# Patient Record
Sex: FEMALE | Race: AMERICAN INDIAN OR ALASKA NATIVE | Employment: UNEMPLOYED | URBAN - METROPOLITAN AREA
[De-identification: names, ages, dates, MRNs, and addresses within clinical notes are randomized per-mention and may not be internally consistent; named-entity substitution may affect disease eponyms.]

---

## 2018-03-07 ENCOUNTER — HOSPITAL ENCOUNTER (EMERGENCY)
Age: 33
Discharge: HOME | End: 2018-03-07
Attending: EMERGENCY MEDICINE
Payer: SUBSIDIZED

## 2018-03-07 ENCOUNTER — APPOINTMENT (OUTPATIENT)
Dept: GENERAL RADIOLOGY | Age: 33
End: 2018-03-07
Attending: EMERGENCY MEDICINE
Payer: SUBSIDIZED

## 2018-03-07 VITALS
DIASTOLIC BLOOD PRESSURE: 79 MMHG | RESPIRATION RATE: 22 BRPM | HEART RATE: 110 BPM | HEIGHT: 63 IN | WEIGHT: 130 LBS | SYSTOLIC BLOOD PRESSURE: 126 MMHG | BODY MASS INDEX: 23.04 KG/M2 | OXYGEN SATURATION: 99 % | TEMPERATURE: 98.9 F

## 2018-03-07 DIAGNOSIS — T40.1X1A ACCIDENTAL OVERDOSE OF HEROIN, INITIAL ENCOUNTER (HCC): Primary | ICD-10-CM

## 2018-03-07 PROCEDURE — 94762 N-INVAS EAR/PLS OXIMTRY CONT: CPT | Performed by: EMERGENCY MEDICINE

## 2018-03-07 PROCEDURE — 93005 ELECTROCARDIOGRAM TRACING: CPT | Performed by: EMERGENCY MEDICINE

## 2018-03-07 PROCEDURE — 74011250636 HC RX REV CODE- 250/636: Performed by: EMERGENCY MEDICINE

## 2018-03-07 PROCEDURE — 71045 X-RAY EXAM CHEST 1 VIEW: CPT

## 2018-03-07 PROCEDURE — 99285 EMERGENCY DEPT VISIT HI MDM: CPT | Performed by: EMERGENCY MEDICINE

## 2018-03-07 PROCEDURE — 96374 THER/PROPH/DIAG INJ IV PUSH: CPT | Performed by: EMERGENCY MEDICINE

## 2018-03-07 PROCEDURE — 82962 GLUCOSE BLOOD TEST: CPT | Performed by: EMERGENCY MEDICINE

## 2018-03-07 RX ORDER — NALOXONE HYDROCHLORIDE 0.4 MG/ML
0.4 INJECTION, SOLUTION INTRAMUSCULAR; INTRAVENOUS; SUBCUTANEOUS
Status: COMPLETED | OUTPATIENT
Start: 2018-03-07 | End: 2018-03-07

## 2018-03-07 RX ORDER — NALOXONE HYDROCHLORIDE 0.4 MG/ML
INJECTION, SOLUTION INTRAMUSCULAR; INTRAVENOUS; SUBCUTANEOUS
Status: ACTIVE
Start: 2018-03-07 | End: 2018-03-08

## 2018-03-07 RX ADMIN — NALOXONE HYDROCHLORIDE 0.4 MG: 0.4 INJECTION, SOLUTION INTRAMUSCULAR; INTRAVENOUS; SUBCUTANEOUS at 18:33

## 2018-03-07 NOTE — Clinical Note
Follow-up at [facility] tomorrow morning at 5 AM (no later than 0530)for treatment with methadone or Suboxone.

## 2018-03-08 LAB
ATRIAL RATE: 93 BPM
CALCULATED P AXIS, ECG09: 82 DEGREES
CALCULATED R AXIS, ECG10: 80 DEGREES
CALCULATED T AXIS, ECG11: 76 DEGREES
DIAGNOSIS, 93000: NORMAL
P-R INTERVAL, ECG05: 134 MS
Q-T INTERVAL, ECG07: 336 MS
QRS DURATION, ECG06: 80 MS
QTC CALCULATION (BEZET), ECG08: 417 MS
VENTRICULAR RATE, ECG03: 93 BPM

## 2018-03-08 NOTE — DISCHARGE INSTRUCTIONS
Learning About Naloxone for Opioid Overdose  What is naloxone? Opioids are strong pain medicines. Examples include hydrocodone, oxycodone, fentanyl, and morphine. Heroin is an example of an illegal opioid. Taking too much of an opioid can cause death. An overdose is an emergency. Naloxone is a medicine that reverses the effects of an overdose. If you take it soon enough after an overdose, it can save your life. Naloxone comes in a rescue kit you can carry with you. You may hear it called a Narcan kit for short. The rescue kit may contain:  · The medicine. · Syringes and needles. · A nasal adapter for the syringes. · A separate nasal spray device. Your doctor can give you a prescription for a rescue kit and show you how to use it. In some places you can buy Narcan kits without a prescription. When is naloxone used? Naloxone is used when a person shows signs of an opioid overdose. A person may have overdosed if he or she is:  · Sleepy or hard to wake up. · Confused. · Not breathing normally. Make sure your family and friends know about these signs of an overdose. If someone appears to have overdosed, call 911. A drug overdose is an emergency. How do you use it? If you overdose, you may not be able to give yourself the medicine. So it's very important to teach friends and family how and when to give it to you. Rescue kits come with instructions. There are two ways to give the medicine. Many rescue kits can be used for either method. The methods are:  · Injection with needle and syringe. ¨ Training may be needed in order to use this method correctly. ¨ Some rescue kits come with automatic injectors that don't require special training. ¨ The medicine can be injected through clothing. · Nasal spray. ¨ Many rescue kits come with a nasal adapter. It attaches to the syringe and turns the medicine into a mist.  ¨ The mist is sprayed into the nose of a person who has overdosed.  The person needs to be lying down when the mist is sprayed. Overdose symptoms may return a few minutes after the first dose from the rescue kit. If that happens, a second dose is needed. Rescue kits come with two doses for that reason. Keep your rescue kit with you always. You never know when you might need it. If you think you or someone else may have overdosed but you're not sure, use the kit anyway. Aside from going through withdrawal, which may be uncomfortable, there is no downside to using this medicine. Always go to the emergency room after using naloxone. Doctors will want to make sure the overdose has been reversed. Follow-up care is a key part of your treatment and safety. Be sure to make and go to all appointments, and call your doctor if you are having problems. It's also a good idea to know your test results and keep a list of the medicines you take. Where can you learn more? Go to http://julita-ariana.info/. Enter Z800 in the search box to learn more about \"Learning About Naloxone for Opioid Overdose. \"  Current as of: November 3, 2016  Content Version: 11.4  © 7170-5982 [employer]. Care instructions adapted under license by [employer] (which disclaims liability or warranty for this information). If you have questions about a medical condition or this instruction, always ask your healthcare professional. [address] any warranty or liability for your use of this information.

## 2018-03-08 NOTE — ED PROVIDER NOTES
HPI Comments: Patient dropped off at the front door unconscious and unresponsive after heroin overdose. Patient was not breathing without a pulse and was cyanotic. Patient is a 27 y.o. female presenting with Ingested Medication. The history is provided by a friend. Drug Overdose   This is a new problem. The current episode started less than 1 hour ago. No past medical history on file. No past surgical history on file. No family history on file. Social History     Social History    Marital status: N/A     Spouse name: N/A    Number of children: N/A    Years of education: N/A     Occupational History    Not on file. Social History Main Topics    Smoking status: Not on file    Smokeless tobacco: Not on file    Alcohol use Not on file    Drug use: Not on file    Sexual activity: Not on file     Other Topics Concern    Not on file     Social History Narrative         ALLERGIES: Review of patient's allergies indicates no known allergies. Review of Systems   Unable to perform ROS: Mental status change       Vitals:    03/07/18 1840 03/07/18 1851   BP: 142/81    Pulse: (!) 124    Resp: 15    Temp:  98.9 °F (37.2 °C)   SpO2: 100%    Weight: 59 kg (130 lb)    Height: 5' 3\" (1.6 m)             Physical Exam   Constitutional: She appears distressed. Unresponsive with circumoral cyanosis   Cardiovascular: Normal rate, regular rhythm and normal heart sounds. Pulses:       Carotid pulses are 2+ on the right side, and 2+ on the left side. Radial pulses are 2+ on the right side, and 2+ on the left side. Pulmonary/Chest:   no chest movement   Abdominal: Soft. She exhibits no distension. Musculoskeletal: She exhibits no edema. Neurological: GCS eye subscore is 1. GCS verbal subscore is 1. GCS motor subscore is 1. Skin: Skin is warm. She is diaphoretic. Track marks bilateral forearms and antecubital fossa   Nursing note and vitals reviewed.        MDM  Number of Diagnoses or Management Options  Diagnosis management comments: Patient was brought into room 1 and artificial respirations with an Ambu bag were provided. Initial sats were 12. Patient quickly elaine to a sat of 100%. Patient maintain a pulse throughout. She remained unresponsive. After IV access was obtained 0.4 mg of Narcan was given. Blood sugar was obtained and was unremarkable. After approximately 2 minutes the patient became responsive. 7:36 PM  This is the patient's second overdose in the last week. She went to another hospital and seems open to following up for methadone or Suboxone treatment. She will be referred. Amount and/or Complexity of Data Reviewed  Clinical lab tests: ordered and reviewed  Tests in the radiology section of CPT®: ordered and reviewed (Xr Chest Port    Result Date: 3/7/2018  Portable chest: History: status post opiate overdose. Comparison: None Findings: Portable AP views of the chest were obtained at 1850 hours. The cardiac and mediastinal silhouette are normal in size and configuration. The lungs and pleural spaces are clear. The pulmonary vascularity is within normal limits.      Impression: Unremarkable portable chest radiograph     )          ED Course       Procedures

## 2018-03-08 NOTE — ED NOTES
Pt feels staff did nothing for her. Demanded IV be removed. Stated she was leaving and demanded her clothing. As reported from day shift staff, pt informed clothing removed quickly with trauma scissors on arrival.  Dr. Dwayne Caldwellr aware. Discharge order noted.

## 2018-03-08 NOTE — ED NOTES
Pt remains angry with cursing toward staff. Blaming for situation. I have reviewed discharge instructions with the patient. The patient would not verbalized understanding. Patient left ED via Discharge Method: ambulatory to Home with self. Opportunity for questions and clarification provided. Patient given 0 scripts. To continue your aftercare when you leave the hospital, you may receive an automated call from our care team to check in on how you are doing. This is a free service and part of our promise to provide the best care and service to meet your aftercare needs.  If you have questions, or wish to unsubscribe from this service please call [phone]. Thank you for Choosing our [facility] Emergency Department. Pt found pants lying on floor. Paper shirt provided.

## 2018-03-08 NOTE — ED NOTES
Report to [facility] and Liliya ARMANDO. They are to assume care of this pt at this time.

## 2018-03-20 LAB — GLUCOSE BLD STRIP.AUTO-MCNC: 234 MG/DL (ref 65–100)

## 2018-11-11 ENCOUNTER — APPOINTMENT (OUTPATIENT)
Dept: GENERAL RADIOLOGY | Age: 33
DRG: 580 | End: 2018-11-11
Attending: EMERGENCY MEDICINE
Payer: SUBSIDIZED

## 2018-11-11 ENCOUNTER — HOSPITAL ENCOUNTER (INPATIENT)
Age: 33
LOS: 5 days | Discharge: HOME | DRG: 580 | End: 2018-11-16
Attending: EMERGENCY MEDICINE | Admitting: HOSPITALIST
Payer: SUBSIDIZED

## 2018-11-11 DIAGNOSIS — L03.113 CELLULITIS OF RIGHT UPPER EXTREMITY: Primary | ICD-10-CM

## 2018-11-11 DIAGNOSIS — F19.10 INTRAVENOUS DRUG ABUSE (HCC): ICD-10-CM

## 2018-11-11 LAB
ANION GAP SERPL CALC-SCNC: 7 MMOL/L
BASOPHILS # BLD: 0 K/UL (ref 0–0.2)
BASOPHILS NFR BLD: 0 % (ref 0–2)
BUN SERPL-MCNC: 10 MG/DL (ref 6–23)
CALCIUM SERPL-MCNC: 8.6 MG/DL (ref 8.3–10.4)
CHLORIDE SERPL-SCNC: 101 MMOL/L (ref 98–107)
CO2 SERPL-SCNC: 29 MMOL/L (ref 21–32)
CREAT SERPL-MCNC: 0.62 MG/DL (ref 0.6–1)
DIFFERENTIAL METHOD BLD: ABNORMAL
EOSINOPHIL # BLD: 0.1 K/UL (ref 0–0.8)
EOSINOPHIL NFR BLD: 1 % (ref 0.5–7.8)
ERYTHROCYTE [DISTWIDTH] IN BLOOD BY AUTOMATED COUNT: 13.6 %
GLUCOSE SERPL-MCNC: 102 MG/DL (ref 65–100)
HCT VFR BLD AUTO: 34.2 % (ref 35.8–46.3)
HGB BLD-MCNC: 11.3 G/DL (ref 11.7–15.4)
IMM GRANULOCYTES # BLD: 0 K/UL (ref 0–0.5)
IMM GRANULOCYTES NFR BLD AUTO: 0 % (ref 0–5)
LYMPHOCYTES # BLD: 1.7 K/UL (ref 0.5–4.6)
LYMPHOCYTES NFR BLD: 20 % (ref 13–44)
MCH RBC QN AUTO: 28.3 PG (ref 26.1–32.9)
MCHC RBC AUTO-ENTMCNC: 33 G/DL (ref 31.4–35)
MCV RBC AUTO: 85.7 FL (ref 79.6–97.8)
MONOCYTES # BLD: 0.8 K/UL (ref 0.1–1.3)
MONOCYTES NFR BLD: 10 % (ref 4–12)
NEUTS SEG # BLD: 5.7 K/UL (ref 1.7–8.2)
NEUTS SEG NFR BLD: 68 % (ref 43–78)
NRBC # BLD: 0 K/UL (ref 0–0.2)
PLATELET # BLD AUTO: 280 K/UL (ref 150–450)
PMV BLD AUTO: 9.6 FL (ref 9.4–12.3)
POTASSIUM SERPL-SCNC: 4.1 MMOL/L (ref 3.5–5.1)
RBC # BLD AUTO: 3.99 M/UL (ref 4.05–5.2)
SODIUM SERPL-SCNC: 137 MMOL/L (ref 136–145)
WBC # BLD AUTO: 8.3 K/UL (ref 4.3–11.1)

## 2018-11-11 PROCEDURE — 65270000029 HC RM PRIVATE

## 2018-11-11 PROCEDURE — 0J9G0ZZ DRAINAGE OF RIGHT LOWER ARM SUBCUTANEOUS TISSUE AND FASCIA, OPEN APPROACH: ICD-10-PCS | Performed by: EMERGENCY MEDICINE

## 2018-11-11 PROCEDURE — 73080 X-RAY EXAM OF ELBOW: CPT

## 2018-11-11 PROCEDURE — 36415 COLL VENOUS BLD VENIPUNCTURE: CPT

## 2018-11-11 PROCEDURE — 87040 BLOOD CULTURE FOR BACTERIA: CPT

## 2018-11-11 PROCEDURE — 85025 COMPLETE CBC W/AUTO DIFF WBC: CPT

## 2018-11-11 PROCEDURE — 80048 BASIC METABOLIC PNL TOTAL CA: CPT

## 2018-11-11 PROCEDURE — 77030019895 HC PCKNG STRP IODO -A

## 2018-11-11 PROCEDURE — 96374 THER/PROPH/DIAG INJ IV PUSH: CPT | Performed by: EMERGENCY MEDICINE

## 2018-11-11 PROCEDURE — 99283 EMERGENCY DEPT VISIT LOW MDM: CPT | Performed by: EMERGENCY MEDICINE

## 2018-11-11 PROCEDURE — 75810000289 HC I&D ABSCESS SIMP/COMP/MULT: Performed by: EMERGENCY MEDICINE

## 2018-11-11 PROCEDURE — 87205 SMEAR GRAM STAIN: CPT

## 2018-11-11 PROCEDURE — 74011250636 HC RX REV CODE- 250/636: Performed by: EMERGENCY MEDICINE

## 2018-11-11 RX ORDER — TRAMADOL HYDROCHLORIDE 50 MG/1
50 TABLET ORAL
Status: DISCONTINUED | OUTPATIENT
Start: 2018-11-11 | End: 2018-11-16 | Stop reason: HOSPADM

## 2018-11-11 RX ORDER — CLINDAMYCIN PHOSPHATE 900 MG/50ML
900 INJECTION INTRAVENOUS ONCE
Status: COMPLETED | OUTPATIENT
Start: 2018-11-11 | End: 2018-11-11

## 2018-11-11 RX ORDER — ACETAMINOPHEN 325 MG/1
650 TABLET ORAL
Status: DISCONTINUED | OUTPATIENT
Start: 2018-11-11 | End: 2018-11-16 | Stop reason: HOSPADM

## 2018-11-11 RX ORDER — NALOXONE HYDROCHLORIDE 0.4 MG/ML
0.4 INJECTION, SOLUTION INTRAMUSCULAR; INTRAVENOUS; SUBCUTANEOUS AS NEEDED
Status: DISCONTINUED | OUTPATIENT
Start: 2018-11-11 | End: 2018-11-16 | Stop reason: HOSPADM

## 2018-11-11 RX ORDER — MORPHINE SULFATE 8 MG/ML
4 INJECTION, SOLUTION INTRAMUSCULAR; INTRAVENOUS ONCE
Status: COMPLETED | OUTPATIENT
Start: 2018-11-11 | End: 2018-11-11

## 2018-11-11 RX ORDER — SODIUM CHLORIDE 0.9 % (FLUSH) 0.9 %
5-10 SYRINGE (ML) INJECTION AS NEEDED
Status: DISCONTINUED | OUTPATIENT
Start: 2018-11-11 | End: 2018-11-16 | Stop reason: HOSPADM

## 2018-11-11 RX ORDER — CLINDAMYCIN PHOSPHATE 600 MG/50ML
600 INJECTION INTRAVENOUS EVERY 8 HOURS
Status: DISCONTINUED | OUTPATIENT
Start: 2018-11-12 | End: 2018-11-12

## 2018-11-11 RX ORDER — SODIUM CHLORIDE 0.9 % (FLUSH) 0.9 %
5-10 SYRINGE (ML) INJECTION EVERY 8 HOURS
Status: DISCONTINUED | OUTPATIENT
Start: 2018-11-12 | End: 2018-11-16 | Stop reason: HOSPADM

## 2018-11-11 RX ORDER — LIDOCAINE HYDROCHLORIDE 10 MG/ML
10 INJECTION, SOLUTION EPIDURAL; INFILTRATION; INTRACAUDAL; PERINEURAL
Status: ACTIVE | OUTPATIENT
Start: 2018-11-11 | End: 2018-11-12

## 2018-11-11 RX ADMIN — Medication 4 MG: at 21:53

## 2018-11-11 RX ADMIN — CLINDAMYCIN PHOSPHATE 900 MG: 900 INJECTION, SOLUTION INTRAVENOUS at 23:26

## 2018-11-12 PROBLEM — F19.10 DRUG ABUSE (HCC): Status: ACTIVE | Noted: 2018-11-12

## 2018-11-12 LAB
ALBUMIN SERPL-MCNC: 2.8 G/DL (ref 3.5–5)
ALBUMIN/GLOB SERPL: 0.7 {RATIO}
ALP SERPL-CCNC: 79 U/L (ref 50–136)
ALT SERPL-CCNC: 23 U/L (ref 12–65)
AMPHET UR QL SCN: POSITIVE
ANION GAP SERPL CALC-SCNC: 12 MMOL/L
AST SERPL-CCNC: 16 U/L (ref 15–37)
BACTERIA SPEC CULT: ABNORMAL
BACTERIA SPEC CULT: ABNORMAL
BARBITURATES UR QL SCN: NEGATIVE
BASOPHILS # BLD: 0 K/UL (ref 0–0.2)
BASOPHILS NFR BLD: 0 % (ref 0–2)
BENZODIAZ UR QL: POSITIVE
BILIRUB SERPL-MCNC: 0.3 MG/DL (ref 0.2–1.1)
BUN SERPL-MCNC: 6 MG/DL (ref 6–23)
CALCIUM SERPL-MCNC: 8.7 MG/DL (ref 8.3–10.4)
CANNABINOIDS UR QL SCN: POSITIVE
CHLORIDE SERPL-SCNC: 101 MMOL/L (ref 98–107)
CO2 SERPL-SCNC: 23 MMOL/L (ref 21–32)
COCAINE UR QL SCN: NEGATIVE
CREAT SERPL-MCNC: 0.57 MG/DL (ref 0.6–1)
CRP SERPL-MCNC: 7.1 MG/DL (ref 0–0.9)
DIFFERENTIAL METHOD BLD: ABNORMAL
EOSINOPHIL # BLD: 0.1 K/UL (ref 0–0.8)
EOSINOPHIL NFR BLD: 1 % (ref 0.5–7.8)
ERYTHROCYTE [DISTWIDTH] IN BLOOD BY AUTOMATED COUNT: 13.3 %
GLOBULIN SER CALC-MCNC: 4.3 G/DL (ref 2.3–3.5)
GLUCOSE SERPL-MCNC: 119 MG/DL (ref 65–100)
HCG UR QL: NEGATIVE
HCT VFR BLD AUTO: 34.7 % (ref 35.8–46.3)
HGB BLD-MCNC: 11.4 G/DL (ref 11.7–15.4)
HIV1 P24 AG SERPL QL IA: NONREACTIVE
HIV1+2 AB SERPL QL IA: NONREACTIVE
IMM GRANULOCYTES # BLD: 0.1 K/UL (ref 0–0.5)
IMM GRANULOCYTES NFR BLD AUTO: 1 % (ref 0–5)
LYMPHOCYTES # BLD: 1.8 K/UL (ref 0.5–4.6)
LYMPHOCYTES NFR BLD: 19 % (ref 13–44)
MCH RBC QN AUTO: 28 PG (ref 26.1–32.9)
MCHC RBC AUTO-ENTMCNC: 32.9 G/DL (ref 31.4–35)
MCV RBC AUTO: 85.3 FL (ref 79.6–97.8)
METHADONE UR QL: NEGATIVE
MONOCYTES # BLD: 0.9 K/UL (ref 0.1–1.3)
MONOCYTES NFR BLD: 10 % (ref 4–12)
NEUTS SEG # BLD: 6.5 K/UL (ref 1.7–8.2)
NEUTS SEG NFR BLD: 69 % (ref 43–78)
NRBC # BLD: 0 K/UL (ref 0–0.2)
OPIATES UR QL: POSITIVE
PCP UR QL: NEGATIVE
PLATELET # BLD AUTO: 205 K/UL (ref 150–450)
PLATELET COMMENTS,PCOM: ADEQUATE
PMV BLD AUTO: 10.9 FL (ref 9.4–12.3)
POTASSIUM SERPL-SCNC: 4.3 MMOL/L (ref 3.5–5.1)
PROT SERPL-MCNC: 7.1 G/DL
RBC # BLD AUTO: 4.07 M/UL (ref 4.05–5.2)
RBC MORPH BLD: ABNORMAL
SERVICE CMNT-IMP: ABNORMAL
SODIUM SERPL-SCNC: 136 MMOL/L (ref 136–145)
WBC # BLD AUTO: 9.4 K/UL (ref 4.3–11.1)
WBC MORPH BLD: ABNORMAL

## 2018-11-12 PROCEDURE — 77030019895 HC PCKNG STRP IODO -A

## 2018-11-12 PROCEDURE — 74011250636 HC RX REV CODE- 250/636: Performed by: HOSPITALIST

## 2018-11-12 PROCEDURE — 87389 HIV-1 AG W/HIV-1&-2 AB AG IA: CPT

## 2018-11-12 PROCEDURE — 86140 C-REACTIVE PROTEIN: CPT

## 2018-11-12 PROCEDURE — 80053 COMPREHEN METABOLIC PANEL: CPT

## 2018-11-12 PROCEDURE — 77030032490 HC SLV COMPR SCD KNE COVD -B

## 2018-11-12 PROCEDURE — 85025 COMPLETE CBC W/AUTO DIFF WBC: CPT

## 2018-11-12 PROCEDURE — 81025 URINE PREGNANCY TEST: CPT

## 2018-11-12 PROCEDURE — 74011250637 HC RX REV CODE- 250/637: Performed by: HOSPITALIST

## 2018-11-12 PROCEDURE — 74011250636 HC RX REV CODE- 250/636: Performed by: INTERNAL MEDICINE

## 2018-11-12 PROCEDURE — 36415 COLL VENOUS BLD VENIPUNCTURE: CPT

## 2018-11-12 PROCEDURE — 74011250637 HC RX REV CODE- 250/637: Performed by: INTERNAL MEDICINE

## 2018-11-12 PROCEDURE — 80307 DRUG TEST PRSMV CHEM ANLYZR: CPT

## 2018-11-12 PROCEDURE — 65270000029 HC RM PRIVATE

## 2018-11-12 PROCEDURE — 87641 MR-STAPH DNA AMP PROBE: CPT

## 2018-11-12 RX ORDER — METHADONE HYDROCHLORIDE 10 MG/1
10 TABLET ORAL EVERY 8 HOURS
Status: DISCONTINUED | OUTPATIENT
Start: 2018-11-12 | End: 2018-11-16 | Stop reason: HOSPADM

## 2018-11-12 RX ORDER — ONDANSETRON 2 MG/ML
4 INJECTION INTRAMUSCULAR; INTRAVENOUS
Status: DISCONTINUED | OUTPATIENT
Start: 2018-11-12 | End: 2018-11-13

## 2018-11-12 RX ORDER — OXYCODONE AND ACETAMINOPHEN 7.5; 325 MG/1; MG/1
1 TABLET ORAL ONCE
Status: COMPLETED | OUTPATIENT
Start: 2018-11-12 | End: 2018-11-12

## 2018-11-12 RX ORDER — KETOROLAC TROMETHAMINE 15 MG/ML
30 INJECTION, SOLUTION INTRAMUSCULAR; INTRAVENOUS ONCE
Status: COMPLETED | OUTPATIENT
Start: 2018-11-12 | End: 2018-11-12

## 2018-11-12 RX ORDER — ZOLPIDEM TARTRATE 5 MG/1
5 TABLET ORAL
Status: DISCONTINUED | OUTPATIENT
Start: 2018-11-12 | End: 2018-11-16 | Stop reason: HOSPADM

## 2018-11-12 RX ORDER — CLINDAMYCIN PHOSPHATE 600 MG/50ML
600 INJECTION INTRAVENOUS EVERY 6 HOURS
Status: DISCONTINUED | OUTPATIENT
Start: 2018-11-12 | End: 2018-11-15 | Stop reason: DRUGHIGH

## 2018-11-12 RX ORDER — KETOROLAC TROMETHAMINE 15 MG/ML
15 INJECTION, SOLUTION INTRAMUSCULAR; INTRAVENOUS
Status: DISCONTINUED | OUTPATIENT
Start: 2018-11-12 | End: 2018-11-16 | Stop reason: HOSPADM

## 2018-11-12 RX ADMIN — OXYCODONE HYDROCHLORIDE AND ACETAMINOPHEN 1 TABLET: 7.5; 325 TABLET ORAL at 03:19

## 2018-11-12 RX ADMIN — METHADONE HYDROCHLORIDE 10 MG: 10 TABLET ORAL at 22:14

## 2018-11-12 RX ADMIN — KETOROLAC TROMETHAMINE 15 MG: 15 INJECTION, SOLUTION INTRAMUSCULAR; INTRAVENOUS at 12:34

## 2018-11-12 RX ADMIN — Medication 10 ML: at 16:33

## 2018-11-12 RX ADMIN — Medication 10 ML: at 00:15

## 2018-11-12 RX ADMIN — TRAMADOL HYDROCHLORIDE 50 MG: 50 TABLET, FILM COATED ORAL at 00:11

## 2018-11-12 RX ADMIN — TRAMADOL HYDROCHLORIDE 50 MG: 50 TABLET, FILM COATED ORAL at 22:14

## 2018-11-12 RX ADMIN — TRAMADOL HYDROCHLORIDE 50 MG: 50 TABLET, FILM COATED ORAL at 09:01

## 2018-11-12 RX ADMIN — ZOLPIDEM TARTRATE 5 MG: 5 TABLET ORAL at 00:24

## 2018-11-12 RX ADMIN — CLINDAMYCIN PHOSPHATE 600 MG: 600 INJECTION, SOLUTION INTRAVENOUS at 18:30

## 2018-11-12 RX ADMIN — TRAMADOL HYDROCHLORIDE 50 MG: 50 TABLET, FILM COATED ORAL at 16:29

## 2018-11-12 RX ADMIN — ACETAMINOPHEN 650 MG: 325 TABLET, FILM COATED ORAL at 09:01

## 2018-11-12 RX ADMIN — ACETAMINOPHEN 650 MG: 325 TABLET, FILM COATED ORAL at 00:09

## 2018-11-12 RX ADMIN — KETOROLAC TROMETHAMINE 30 MG: 15 INJECTION, SOLUTION INTRAMUSCULAR; INTRAVENOUS at 05:23

## 2018-11-12 RX ADMIN — ACETAMINOPHEN 650 MG: 325 TABLET, FILM COATED ORAL at 16:29

## 2018-11-12 RX ADMIN — METHADONE HYDROCHLORIDE 10 MG: 10 TABLET ORAL at 12:33

## 2018-11-12 RX ADMIN — CLINDAMYCIN PHOSPHATE 600 MG: 600 INJECTION, SOLUTION INTRAVENOUS at 12:34

## 2018-11-12 RX ADMIN — Medication 10 ML: at 14:00

## 2018-11-12 RX ADMIN — ONDANSETRON 4 MG: 2 INJECTION, SOLUTION INTRAMUSCULAR; INTRAVENOUS at 16:29

## 2018-11-12 RX ADMIN — ZOLPIDEM TARTRATE 5 MG: 5 TABLET ORAL at 22:14

## 2018-11-12 NOTE — ED NOTES
TRANSFER - OUT REPORT: 
 
Verbal report [facility] to [address] [place] [address] (unit) for routine progression of care Report consisted of patients Situation, Background, Assessment and  
Recommendations(SBAR). Information from the following report(s) ED Summary was reviewed with the receiving nurse. Lines:  
Peripheral IV 11/11/18 Left Antecubital (Active) Site Assessment Clean, dry, & intact 11/11/2018  8:33 PM  
Phlebitis Assessment 0 11/11/2018  8:33 PM  
Infiltration Assessment 0 11/11/2018  8:33 PM  
  
 
Opportunity for questions and clarification was provided. Patient transported with: 
 [employer]

## 2018-11-12 NOTE — PROGRESS NOTES
Dressing changed to right arm, removed packing and repacked IDOFORM tape, opening 1 CM and draining serosanguinous drainage. Covered then with gauze and tegaderm and wrapped with ace wrap, red area around wound was marked with skin marker.

## 2018-11-12 NOTE — PROGRESS NOTES
Critical nasal swab + = MRSA and reported to MD @ 1414 and d/c'd clindo and ordered cx for Vancomycin. Pt placed on contact precautions.   Katie Griffiths RN

## 2018-11-12 NOTE — ED NOTES
Case discussed with hospitalist due to patient's history of drug abuse and the symptomatology and size of the infection was felt the admission for observation and repeat IV antibiotics be the best course of action to avoid losing to follow-up.

## 2018-11-12 NOTE — PROGRESS NOTES
Rec'd report from jam ARMANDO @0460 SBAR and in room @ [phone]. Oriented to room; call bell, bed controls, TV control,  and IV/ABT complete and hep-welled. Dr Gardenia Muller at bedside and he reviewed and reiterated there would be no IV pain medication. Only VS to obtain would be O2 sat/temp secondary to I&D on right arm and PIV in left ac. .  May resume complete VS after next IV/ABT.  0100: Called MD for clarification on a hot cloth for I&D site vs cold and requested niether. He did give a 1 X order fopr percocet7.5 po. Pt rec'd tramadol and tylenol 0011. After assessment and pain med givenpt is calming down and stopped swearing at me with c/o of \"all of us not addressing her pain and having to go to the streets. \" Skin assessment complete with Cata Ambrosio. Several tattoos on hands and arms and a  horizontal 
On lower abdomen. 0130: pt resting quietly and calmed down. Has juice at bedside and water. Also advised her the urine cup was in the bathroom for her UA test.  Until that is received, MD cannot order the toradol. Pt aware. Heat turned down for comfort abd 4 blankets removed.   Alisa Hightower RN

## 2018-11-12 NOTE — PROGRESS NOTES
Problem: Falls - Risk of 
Goal: *Absence of Falls Document Sofia Covington Fall Risk and appropriate interventions in the flowsheet. Outcome: Progressing Towards Goal 
Fall Risk Interventions: 
Mobility Interventions: Patient to call before getting OOB Mentation Interventions: Adequate sleep, hydration, pain control, Evaluate medications/consider consulting pharmacy Medication Interventions: Evaluate medications/consider consulting pharmacy, Patient to call before getting OOB Elimination Interventions: Patient to call for help with toileting needs

## 2018-11-12 NOTE — PROGRESS NOTES
Cm visited patient due to self-pay status. Pt states that she get funds from the [employer] and they pay for her medical bills. Pt do not have a PCP, but is wanting to get set up with a PCP. CM provided patient with [facility] and the [employer] information. Pt is currently a Heroin addict and want to get assistance from a Methadone Clinic. Cm provided pt with [facility]. Patient states no PCP and no insurance. Explained the [address] program in detail and provided patient with resources. Contact information for [facility] with [address] given to patient and patient encouraged to follow up with her as soon as possible. Patient also given contact information for [facility] with [place] and encouraged to call to get screened for Medicaid/Insurance eligibility and/or financial assistance.

## 2018-11-12 NOTE — PROGRESS NOTES
Initial visit was made, emotional support and a spiritual presence were provided.  card was left with the patient. This patient was asleep. Marry Kraft

## 2018-11-12 NOTE — ED PROVIDER NOTES
Patient is return for evaluation due to pain swelling redness and  Warmth to the antecubital fossa of the right arm. She states this is been developing over the past several days. Patient does admit to IV drug use as well as polysubstance abuse. She reports a similar occurrence in the opposite arm a few years ago. She denies any fever or chills chest pain or shortness of breath she denies any other lesions or rashes. The patient has been injecting in the right forearm distal to the area of presumed infection. The history is provided by the patient. Skin Problem This is a new problem. The current episode started 2 days ago. The problem has been rapidly worsening. Associated with: IV or subcutaneous drug use. There has been no fever. The rash is present on the right arm. The pain is at a severity of 10/10. The pain is severe. The pain has been constant since onset. Associated symptoms include pain. Pertinent negatives include no blisters, no itching, no weeping and no hives. Risk factors: IV drug use. She has tried nothing for the symptoms. The treatment provided no relief. History reviewed. No pertinent past medical history. Past Surgical History:  
Procedure Laterality Date  HX  SECTION    
 HX GYN    
 HX ORTHOPAEDIC  2018  
 left arm  HX TUBAL LIGATION Bilateral   
 
   
History reviewed. No pertinent family history. Social History Socioeconomic History  Marital status: SINGLE Spouse name: Not on file  Number of children: Not on file  Years of education: Not on file  Highest education level: Not on file Social Needs  Financial resource strain: Not on file  Food insecurity - worry: Not on file  Food insecurity - inability: Not on file  Transportation needs - medical: Not on file  Transportation needs - non-medical: Not on file Occupational History  Not on file Tobacco Use  Smoking status: Never Smoker  Smokeless tobacco: Never Used Substance and Sexual Activity  Alcohol use: No  
  Frequency: Never  Drug use: Yes Types: Marijuana, Methamphetamines, Heroin, Opiates Comment: daily use  Sexual activity: Not on file Other Topics Concern  Not on file Social History Narrative  Not on file ALLERGIES: Patient has no known allergies. Review of Systems Constitutional: Negative for chills and fever. Skin: Negative for itching. All other systems reviewed and are negative. Vitals:  
 11/11/18 1946 BP: 119/72 Pulse: (!) 118 Resp: 20 Temp: 98.7 °F (37.1 °C) SpO2: 97% Weight: 63.5 kg (140 lb) Height: 5' 3\" (1.6 m) Physical Exam  
Constitutional: She appears well-developed and well-nourished. No distress. HENT:  
Head: Normocephalic and atraumatic. Very poor dentition Eyes: Conjunctivae and EOM are normal. Pupils are equal, round, and reactive to light. Neck: Normal range of motion. Neck supple. Cardiovascular: Normal rate and regular rhythm. Exam reveals no gallop and no friction rub. No murmur heard. Pulmonary/Chest: Effort normal.  
Skin: Skin is warm and dry. A large indurated and erythematous and tender area is noted to the proximal forearm at the level of the antecubital fossa on the right. Bedside ultrasound shows abscess cavity in the subcutaneous tissues. The extremities otherwise neurovascularly intact there is no tenderness to the medial lateral epicondyles or the olecranon process there is limited range of motion in extension. Psychiatric: She has a normal mood and affect. Her behavior is normal.  
Nursing note and vitals reviewed. MDM Number of Diagnoses or Management Options Amount and/or Complexity of Data Reviewed Clinical lab tests: ordered and reviewed Tests in the radiology section of CPT®: ordered and reviewed Independent visualization of images, tracings, or specimens: yes Risk of Complications, Morbidity, and/or Mortality Presenting problems: moderate Diagnostic procedures: moderate Management options: moderate Patient Progress Patient progress: stable I&D Abcess Simple Date/Time: 11/11/2018 10:54 PM 
Performed by: Emir Rubio DO Authorized by: Emir Rubio DO Consent:  
  Consent obtained:  Verbal 
  Consent given by:  Patient Risks discussed:  Bleeding Alternatives discussed:  No treatment Location:  
  Type:  Abscess Location:  Upper extremity Upper extremity location:  Arm Arm location:  R lower arm Pre-procedure details:  
  Skin preparation:  Betadine Anesthesia (see MAR for exact dosages): Anesthesia method:  Local infiltration Local anesthetic:  Lidocaine 1% w/o epi Procedure type:  
  Complexity:  Complex Procedure details:  
  Needle aspiration: no Incision types:  Single straight Incision depth:  Subcutaneous Scalpel blade:  11 Techniques: probed with blunt dissection with hemostat. Drainage:  Bloody Drainage amount: Moderate Wound treatment:  Wound left open Packing materials:  1/2 in gauze Post-procedure details:  
  Patient tolerance of procedure: Tolerated well, no immediate complications

## 2018-11-12 NOTE — H&P
Hospitalist H&P/Consult Note Admit Date:  2018  7:55 PM  
Name:  Vik Arellano Age:  26 y.o. 
:  1985 MRN:  [id number] PCP:  None Treatment Team: Attending Provider: Felice Birmingham MD 
 
HPI:  
27 y/o female with hx IVDA, polysubstance drug abuse (heroin, meth, THC, opiates) presents to ED with pain, erythema swelling right forearm. She has cellulitis and abscess. She is right-handed and usually shoots up in left arm. ED MD performed I & D and got out mainly blood. Placed on IV clindamycin. Due to extent of infection recommended admission. Blood cultures obtained. She is tachycardic but afebrile. 10 systems reviewed and negative except as noted in HPI. History reviewed. No pertinent past medical history. prior abscess left arm Past Surgical History:  
Procedure Laterality Date  HX  SECTION    
 HX GYN    
 HX ORTHOPAEDIC  2018  
 left arm  HX TUBAL LIGATION Bilateral   
  
None No Known Allergies Social History Tobacco Use  Smoking status: Never Smoker  Smokeless tobacco: Never Used Substance Use Topics  Alcohol use: No  
  Frequency: Never  
 polysubstance drug abuse, injection History reviewed. No pertinent family history. There is no immunization history on file for this patient. Objective:  
 
Patient Vitals for the past 24 hrs: 
 Temp Pulse Resp BP SpO2  
18    111/67 99 % 185    131/69 100 % 18 98.7 °F (37.1 °C) (!) 118 20 119/72 97 % Oxygen Therapy O2 Sat (%): 99 % (18) Pulse via Oximetry: 101 beats per minute (18) O2 Device: Room air (18) No intake or output data in the 24 hours ending 18 0013 Physical Exam: 
General:    Well nourished. Alert. Moaning and crying Eyes:   Normal sclera. Extraocular movements intact. ENT:  Normocephalic, atraumatic. Poor dentitiion CV:   tachycardic. No murmur, rub, or gallop. Lungs:  CTAB. No wheezing, rhonchi, or rales. Abdomen: Soft, nontender, nondistended. Bowel sounds normal.  
Extremities: Warm and dry. No cyanosis or edema. Neurologic: CN II-XII grossly intact. Sensation intact. Skin:     No rashes or jaundice. Right forearm abscess/cellulitis Multiple tattoos Psych:  Normal mood and tearful, emotionally labile affect. I reviewed the labs, imaging, EKGs, telemetry, and other studies done this admission. Data Review:  
Recent Results (from the past 24 hour(s)) CBC WITH AUTOMATED DIFF Collection Time: 11/11/18  8:24 PM  
Result Value Ref Range WBC 8.3 4.3 - 11.1 K/uL  
 RBC 3.99 (L) 4.05 - 5.2 M/uL  
 HGB 11.3 (L) 11.7 - 15.4 g/dL HCT 34.2 (L) 35.8 - 46.3 % MCV 85.7 79.6 - 97.8 FL  
 MCH 28.3 26.1 - 32.9 PG  
 MCHC 33.0 31.4 - 35.0 g/dL  
 RDW 13.6 % PLATELET 101 689 - 492 K/uL MPV 9.6 9.4 - 12.3 FL ABSOLUTE NRBC 0.00 0.0 - 0.2 K/uL  
 DF AUTOMATED NEUTROPHILS 68 43 - 78 % LYMPHOCYTES 20 13 - 44 % MONOCYTES 10 4.0 - 12.0 % EOSINOPHILS 1 0.5 - 7.8 % BASOPHILS 0 0.0 - 2.0 % IMMATURE GRANULOCYTES 0 0.0 - 5.0 %  
 ABS. NEUTROPHILS 5.7 1.7 - 8.2 K/UL  
 ABS. LYMPHOCYTES 1.7 0.5 - 4.6 K/UL  
 ABS. MONOCYTES 0.8 0.1 - 1.3 K/UL  
 ABS. EOSINOPHILS 0.1 0.0 - 0.8 K/UL  
 ABS. BASOPHILS 0.0 0.0 - 0.2 K/UL  
 ABS. IMM. GRANS. 0.0 0.0 - 0.5 K/UL METABOLIC PANEL, BASIC Collection Time: 11/11/18  8:24 PM  
Result Value Ref Range Sodium 137 136 - 145 mmol/L Potassium 4.1 3.5 - 5.1 mmol/L Chloride 101 98 - 107 mmol/L  
 CO2 29 21 - 32 mmol/L Anion gap 7 mmol/L Glucose 102 (H) 65 - 100 mg/dL BUN 10 6 - 23 MG/DL Creatinine 0.62 0.6 - 1.0 MG/DL  
 GFR est AA >60 >60 ml/min/1.73m2 GFR est non-AA >60 ml/min/1.73m2 Calcium 8.6 8.3 - 10.4 MG/DL Imaging Studies: CXR Results  (Last 48 hours) None CT Results  (Last 48 hours) None Assessment and Plan: Hospital Problems as of 11/12/2018 Never Reviewed Codes Class Noted - Resolved POA * (Principal) Cellulitis of right arm ICD-10-CM: L03.113 ICD-9-CM: 682.3  11/11/2018 - Present Yes Drug abuse ([facility] [address] ICD-10-CM: F19.10 ICD-9-CM: 305.90  11/12/2018 - Present Yes PLAN: 
· Admit as inpatient, medical bed · Continue IV clindamycin · F/u blood cultures. Check nasal MRSA · If abscess increases then consult surgery · Await UDS. Potential to withdraw · Tylenol and ultram for pain. toradol IV if UCG negative · Try to avoid excessive controlled substances · With hx drug abuse check LFTs and HIV status · May have ambien for sleep · SCDs for DVT prophylaxis Estimated LOS:  Pending clinical course Signed: 
Nicholas Maxwell MD

## 2018-11-12 NOTE — PROGRESS NOTES
Hospitalist Progress Note 2018 Admit Date: 2018  7:55 PM  
NAME: Radha Izquierdo :  1985 MRN:  [id number] Attending: Jacinda Montes MD 
PCP:  None SUBJECTIVE:  
Patient is a 27 yo with IV polysubstance abuse admitted for R arm cellulitis. Had apparent small abscess that I&D was attempted in ED. Patient reports R arm still painful but better than last night. She feels she is going through heroin withdrawals and is getting nauseous and hot/cold. Nasal swab negative for MRSA; only SA DNA detected. Review of Systems negative with exception of pertinent positives noted above PHYSICAL EXAM  
 
Visit Vitals /69 (BP 1 Location: Left arm, BP Patient Position: At rest) Pulse 97 Temp 97.4 °F (36.3 °C) Resp 18 Ht 5' 3\" (1.6 m) Wt 63.5 kg (140 lb) SpO2 98% Breastfeeding? No  
BMI 24.80 kg/m² Temp (24hrs), Av.2 °F (36.8 °C), Min:97.4 °F (36.3 °C), Max:98.8 °F (37.1 °C) Patient Vitals for the past 24 hrs: 
 Temp Pulse Resp BP SpO2  
18 1521 98.6 °F (37 °C) 100 16 99/63 99 % 18 1110 97.4 °F (36.3 °C) 97 18 101/69 98 % 18 0708 97.7 °F (36.5 °C) 87 18 99/69 98 % 18 0330 98.4 °F (36.9 °C) 89 16  98 % 18 0016 98.8 °F (37.1 °C) (!) 106 18  100 % 18 2200    111/67 99 % 18 2155    131/69 100 % 18 1946 98.7 °F (37.1 °C) (!) 118 20 119/72 97 % Oxygen Therapy O2 Sat (%): 98 % (18 1110) Pulse via Oximetry: 101 beats per minute (18) O2 Device: Room air (18) Intake/Output Summary (Last 24 hours) at 2018 1149 Last data filed at 2018 1343 Gross per 24 hour Intake  Output 850 ml Net -850 ml General: No acute distress   
Lungs:  CTA Bilaterally. Heart:  Regular rate and rhythm,  No murmur, rub, or gallop Abdomen: Soft, Non distended, Non tender, Positive bowel sounds Extremities: No cyanosis, clubbing or edema, R arm with packing to I&D site, no fluctuence palpated, surrounding erythema of ~4x3 cm. Neurologic:  No focal deficits Recent Results (from the past 24 hour(s)) CBC WITH AUTOMATED DIFF Collection Time: 11/11/18  8:24 PM  
Result Value Ref Range WBC 8.3 4.3 - 11.1 K/uL  
 RBC 3.99 (L) 4.05 - 5.2 M/uL  
 HGB 11.3 (L) 11.7 - 15.4 g/dL HCT 34.2 (L) 35.8 - 46.3 % MCV 85.7 79.6 - 97.8 FL  
 MCH 28.3 26.1 - 32.9 PG  
 MCHC 33.0 31.4 - 35.0 g/dL  
 RDW 13.6 % PLATELET 708 728 - 175 K/uL MPV 9.6 9.4 - 12.3 FL ABSOLUTE NRBC 0.00 0.0 - 0.2 K/uL  
 DF AUTOMATED NEUTROPHILS 68 43 - 78 % LYMPHOCYTES 20 13 - 44 % MONOCYTES 10 4.0 - 12.0 % EOSINOPHILS 1 0.5 - 7.8 % BASOPHILS 0 0.0 - 2.0 % IMMATURE GRANULOCYTES 0 0.0 - 5.0 %  
 ABS. NEUTROPHILS 5.7 1.7 - 8.2 K/UL  
 ABS. LYMPHOCYTES 1.7 0.5 - 4.6 K/UL  
 ABS. MONOCYTES 0.8 0.1 - 1.3 K/UL  
 ABS. EOSINOPHILS 0.1 0.0 - 0.8 K/UL  
 ABS. BASOPHILS 0.0 0.0 - 0.2 K/UL  
 ABS. IMM. GRANS. 0.0 0.0 - 0.5 K/UL METABOLIC PANEL, BASIC Collection Time: 11/11/18  8:24 PM  
Result Value Ref Range Sodium 137 136 - 145 mmol/L Potassium 4.1 3.5 - 5.1 mmol/L Chloride 101 98 - 107 mmol/L  
 CO2 29 21 - 32 mmol/L Anion gap 7 mmol/L Glucose 102 (H) 65 - 100 mg/dL BUN 10 6 - 23 MG/DL Creatinine 0.62 0.6 - 1.0 MG/DL  
 GFR est AA >60 >60 ml/min/1.73m2 GFR est non-AA >60 ml/min/1.73m2 Calcium 8.6 8.3 - 10.4 MG/DL  
CULTURE, BLOOD Collection Time: 11/11/18 10:56 PM  
Result Value Ref Range Special Requests: NO SPECIAL REQUESTS 
LEFT 
FOREARM Culture result: NO GROWTH AFTER 8 HOURS    
CULTURE, BLOOD Collection Time: 11/11/18 10:56 PM  
Result Value Ref Range Special Requests: NO SPECIAL REQUESTS 
LEFT 
FOREARM Culture result: NO GROWTH AFTER 8 HOURS    
DRUG SCREEN, URINE Collection Time: 11/12/18  3:39 AM  
Result Value Ref Range PCP(PHENCYCLIDINE) NEGATIVE BENZODIAZEPINES POSITIVE    
 COCAINE NEGATIVE AMPHETAMINES POSITIVE METHADONE NEGATIVE     
 THC (TH-CANNABINOL) POSITIVE    
 OPIATES POSITIVE    
 BARBITURATES NEGATIVE     
HCG URINE, QL Collection Time: 11/12/18  3:39 AM  
Result Value Ref Range HCG urine, QL NEGATIVE  NEG    
MSSA/MRSA SC BY PCR, NASAL SWAB Collection Time: 11/12/18  4:19 AM  
Result Value Ref Range Special Requests: NO SPECIAL REQUESTS Culture result: (A) MRSA target DNA not detected, SA target DNA detected. A MRSA negative, SA positive test result does not preclude MRSA nasal colonization. Culture result: RESULTS VERIFIED, PHONED TO AND READ BACK BY 
PRABHA LONGO TO Nadiraheather Ron [address]oueiredo [address] ON [email or web address] METABOLIC PANEL, COMPREHENSIVE Collection Time: 11/12/18  4:51 AM  
Result Value Ref Range Sodium 136 136 - 145 mmol/L Potassium 4.3 3.5 - 5.1 mmol/L Chloride 101 98 - 107 mmol/L  
 CO2 23 21 - 32 mmol/L Anion gap 12 mmol/L Glucose 119 (H) 65 - 100 mg/dL BUN 6 6 - 23 MG/DL Creatinine 0.57 (L) 0.6 - 1.0 MG/DL  
 GFR est AA >60 >60 ml/min/1.73m2 GFR est non-AA >60 ml/min/1.73m2 Calcium 8.7 8.3 - 10.4 MG/DL Bilirubin, total 0.3 0.2 - 1.1 MG/DL  
 ALT (SGPT) 23 12 - 65 U/L  
 AST (SGOT) 16 15 - 37 U/L Alk. phosphatase 79 50 - 136 U/L Protein, total 7.1 g/dL Albumin 2.8 (L) 3.5 - 5.0 g/dL Globulin 4.3 (H) 2.3 - 3.5 g/dL A-G Ratio 0.7 CBC WITH AUTOMATED DIFF Collection Time: 11/12/18  4:51 AM  
Result Value Ref Range WBC 9.4 4.3 - 11.1 K/uL  
 RBC 4.07 4.05 - 5.2 M/uL  
 HGB 11.4 (L) 11.7 - 15.4 g/dL HCT 34.7 (L) 35.8 - 46.3 % MCV 85.3 79.6 - 97.8 FL  
 MCH 28.0 26.1 - 32.9 PG  
 MCHC 32.9 31.4 - 35.0 g/dL  
 RDW 13.3 % PLATELET 439 204 - 939 K/uL MPV 10.9 9.4 - 12.3 FL ABSOLUTE NRBC 0.00 0.0 - 0.2 K/uL NEUTROPHILS 69 43 - 78 % LYMPHOCYTES 19 13 - 44 %  MONOCYTES 10 4.0 - 12.0 %  
 EOSINOPHILS 1 0.5 - 7.8 % BASOPHILS 0 0.0 - 2.0 % IMMATURE GRANULOCYTES 1 0.0 - 5.0 %  
 ABS. NEUTROPHILS 6.5 1.7 - 8.2 K/UL  
 ABS. LYMPHOCYTES 1.8 0.5 - 4.6 K/UL  
 ABS. MONOCYTES 0.9 0.1 - 1.3 K/UL  
 ABS. EOSINOPHILS 0.1 0.0 - 0.8 K/UL  
 ABS. BASOPHILS 0.0 0.0 - 0.2 K/UL  
 ABS. IMM. GRANS. 0.1 0.0 - 0.5 K/UL  
 RBC COMMENTS NORMOCYTIC/NORMOCHROMIC    
 WBC COMMENTS Result Confirmed By Smear PLATELET COMMENTS ADEQUATE    
 DF MANUAL    
C REACTIVE PROTEIN, QT Collection Time: 11/12/18  4:51 AM  
Result Value Ref Range C-Reactive protein 7.1 (H) 0.0 - 0.9 mg/dL HIV-1,2 P24 AG/AB SCREEN Collection Time: 11/12/18  4:51 AM  
Result Value Ref Range p24 Antigen NONREACTIVE    
 HIV-1,2 Ab NONREACTIVE Imaging: XR ELBOW RT MIN 3 V Final Result IMPRESSION: Negative for acute fracture. ASSESSMENT Hospital Problems as of 11/12/2018 Never Reviewed Codes Class Noted - Resolved POA Drug abuse ([facility] [address] ICD-10-CM: F19.10 ICD-9-CM: 305.90  11/12/2018 - Present Yes * (Principal) Cellulitis of right arm ICD-10-CM: L03.113 ICD-9-CM: 682.3  11/11/2018 - Present Yes Plan: 
· Continue IV clindamycin. · Will have staff outline area of cellulitis of R arm to be able to follow for improvement. · Given concern for heroin withdrawal, will start methadone 10 mg TID for now. DVT Prophylaxis: SCDs Signed By: Luís Cross MD   
 November 12, 2018

## 2018-11-13 PROCEDURE — 74011250636 HC RX REV CODE- 250/636: Performed by: INTERNAL MEDICINE

## 2018-11-13 PROCEDURE — 74011250637 HC RX REV CODE- 250/637: Performed by: HOSPITALIST

## 2018-11-13 PROCEDURE — 74011250636 HC RX REV CODE- 250/636: Performed by: HOSPITALIST

## 2018-11-13 PROCEDURE — 65270000029 HC RM PRIVATE

## 2018-11-13 PROCEDURE — 74011250637 HC RX REV CODE- 250/637: Performed by: INTERNAL MEDICINE

## 2018-11-13 PROCEDURE — 74011250637 HC RX REV CODE- 250/637: Performed by: FAMILY MEDICINE

## 2018-11-13 RX ORDER — SODIUM CHLORIDE 9 MG/ML
100 INJECTION, SOLUTION INTRAVENOUS CONTINUOUS
Status: DISCONTINUED | OUTPATIENT
Start: 2018-11-13 | End: 2018-11-16

## 2018-11-13 RX ORDER — PROMETHAZINE HYDROCHLORIDE 25 MG/1
25 TABLET ORAL
Status: DISCONTINUED | OUTPATIENT
Start: 2018-11-13 | End: 2018-11-16 | Stop reason: HOSPADM

## 2018-11-13 RX ADMIN — CLINDAMYCIN PHOSPHATE 600 MG: 600 INJECTION, SOLUTION INTRAVENOUS at 13:02

## 2018-11-13 RX ADMIN — CLINDAMYCIN PHOSPHATE 600 MG: 600 INJECTION, SOLUTION INTRAVENOUS at 19:00

## 2018-11-13 RX ADMIN — Medication 5 ML: at 01:05

## 2018-11-13 RX ADMIN — KETOROLAC TROMETHAMINE 15 MG: 15 INJECTION, SOLUTION INTRAMUSCULAR; INTRAVENOUS at 10:35

## 2018-11-13 RX ADMIN — METHADONE HYDROCHLORIDE 10 MG: 10 TABLET ORAL at 21:42

## 2018-11-13 RX ADMIN — ACETAMINOPHEN 650 MG: 325 TABLET, FILM COATED ORAL at 01:30

## 2018-11-13 RX ADMIN — CLINDAMYCIN PHOSPHATE 600 MG: 600 INJECTION, SOLUTION INTRAVENOUS at 01:04

## 2018-11-13 RX ADMIN — Medication 10 ML: at 14:00

## 2018-11-13 RX ADMIN — METHADONE HYDROCHLORIDE 10 MG: 10 TABLET ORAL at 05:47

## 2018-11-13 RX ADMIN — SODIUM CHLORIDE 1000 ML: 900 INJECTION, SOLUTION INTRAVENOUS at 01:41

## 2018-11-13 RX ADMIN — KETOROLAC TROMETHAMINE 15 MG: 15 INJECTION, SOLUTION INTRAMUSCULAR; INTRAVENOUS at 16:45

## 2018-11-13 RX ADMIN — ACETAMINOPHEN 650 MG: 325 TABLET, FILM COATED ORAL at 08:41

## 2018-11-13 RX ADMIN — KETOROLAC TROMETHAMINE 15 MG: 15 INJECTION, SOLUTION INTRAMUSCULAR; INTRAVENOUS at 02:00

## 2018-11-13 RX ADMIN — SODIUM CHLORIDE 150 ML/HR: 900 INJECTION, SOLUTION INTRAVENOUS at 04:41

## 2018-11-13 RX ADMIN — KETOROLAC TROMETHAMINE 15 MG: 15 INJECTION, SOLUTION INTRAMUSCULAR; INTRAVENOUS at 23:07

## 2018-11-13 RX ADMIN — TRAMADOL HYDROCHLORIDE 50 MG: 50 TABLET, FILM COATED ORAL at 14:09

## 2018-11-13 RX ADMIN — METHADONE HYDROCHLORIDE 10 MG: 10 TABLET ORAL at 14:09

## 2018-11-13 RX ADMIN — Medication 10 ML: at 10:36

## 2018-11-13 RX ADMIN — ZOLPIDEM TARTRATE 5 MG: 5 TABLET ORAL at 22:29

## 2018-11-13 RX ADMIN — TRAMADOL HYDROCHLORIDE 50 MG: 50 TABLET, FILM COATED ORAL at 19:58

## 2018-11-13 RX ADMIN — Medication 10 ML: at 21:42

## 2018-11-13 RX ADMIN — TRAMADOL HYDROCHLORIDE 50 MG: 50 TABLET, FILM COATED ORAL at 05:52

## 2018-11-13 RX ADMIN — PROMETHAZINE HYDROCHLORIDE 25 MG: 25 TABLET ORAL at 17:06

## 2018-11-13 NOTE — PROGRESS NOTES
Pt with c/om pain right arm 11/10, alert resp 17-18  Temp= 101.4, 91/55. MD notified and orders rec'd. See MARS. Pain meds administered and NS bolus up . PRABHA Garg Dr at bedside and pt resting more comfortable at this time. [email or web address] PR[facility]HA [facility] Dr Marek Hernandez pt's BP=91/56, pt feeling much better and afebrile. Cont IVF no new orders.   Ximena Garzon RN

## 2018-11-13 NOTE — PROGRESS NOTES
Hospitalist Progress Note 2018 Admit Date: 2018  7:55 PM  
NAME: Melodie oTbin :  1985 MRN:  [id number] Attending: Cristine Piper MD 
PCP:  None SUBJECTIVE:  
Patient is a 25 yo with IV polysubstance abuse admitted for R arm cellulitis. Had apparent small abscess, I&D drained small amount of purulent material in ED. Nasal swab negative for MRSA; only SA DNA detected. Withdrawal symptoms reduced with methadone but not entirely. Arm looking somewhat improved in antecubital fossa today, but has some swelling distal to that that she wasn't previously feeling. Review of Systems negative with exception of pertinent positives noted above PHYSICAL EXAM  
 
Visit Vitals /68 (BP 1 Location: Left arm, BP Patient Position: At rest) Pulse 71 Temp 97.5 °F (36.4 °C) Resp 18 Ht 5' 3\" (1.6 m) Wt 63.5 kg (140 lb) SpO2 97% Breastfeeding? No  
BMI 24.80 kg/m² Temp (24hrs), Av.6 °F (37 °C), Min:97.5 °F (36.4 °C), Max:101.4 °F (38.6 °C) Patient Vitals for the past 24 hrs: 
 Temp Pulse Resp BP SpO2  
18 1541 97.5 °F (36.4 °C) 71 18 116/68 97 % 18 1142 97.8 °F (36.6 °C) 95 18 118/63 97 % 18 0705 98 °F (36.7 °C) 87 16 (!) 82/57 98 % 18 0350 98.8 °F (37.1 °C) (!) 104 16 91/56 98 % 18 0021 (!) 101.4 °F (38.6 °C) (!) 117 17 91/55 97 % 18 2309 98.2 °F (36.8 °C) (!) 118 16 92/56 99 % 18 1908 98.7 °F (37.1 °C) 100 16 118/83 99 % Oxygen Therapy O2 Sat (%): 97 % (18 1541) Pulse via Oximetry: 101 beats per minute (11/11/18 2200) O2 Device: Room air (18 1541) Intake/Output Summary (Last 24 hours) at 2018 1622 Last data filed at 2018 1142 Gross per 24 hour Intake 2080 ml Output  Net 2080 ml General: No acute distress   
Lungs:  CTA Bilaterally. Heart:  Regular rate and rhythm,  No murmur, rub, or gallop Abdomen: Soft, Non distended, Non tender, Positive bowel sounds Extremities: No cyanosis, clubbing or edema, R arm with packing to I&D site, no fluctuence palpated, surrounding erythema ill defined but cleared from lateral aspects of marked area. Induration present distal to I&D, TTP, warm. Neurologic:  No focal deficits No results found for this or any previous visit (from the past 24 hour(s)). Imaging: XR ELBOW RT MIN 3 V Final Result IMPRESSION: Negative for acute fracture. ASSESSMENT Hospital Problems as of 11/13/2018 Never Reviewed Codes Class Noted - Resolved POA Drug abuse ([facility] [address] ICD-10-CM: F19.10 ICD-9-CM: 305.90  11/12/2018 - Present Yes * (Principal) Cellulitis of right arm ICD-10-CM: L03.113 ICD-9-CM: 682.3  11/11/2018 - Present Yes Plan: 
· Continue IV clindamycin. Nasal swab negative for MRSA. Marked area on arm improving in erythema but induration concerning for persisting infection and possible undrained part of abscess. Will continue to monitor, consider soft tissue US tomorrow if not improving. · Heroin withdrawal: methadone 10 mg TID. Pt interested in methadone treatment and sobriety after discharge. DVT Prophylaxis: SCDs Signed By: Alvaro Lloyd MD   
 November 13, 2018

## 2018-11-13 NOTE — PROGRESS NOTES
Patient A/O x 4 pain has been well controlled, denies any nausea, patient has been up adlib to restroom, walking in room. Dressing to right arm shows some breakthrough serous drainage on the gauze, forearm is swollen, warm and hard below the wound.

## 2018-11-14 LAB
ERYTHROCYTE [DISTWIDTH] IN BLOOD BY AUTOMATED COUNT: 13.2 %
HCT VFR BLD AUTO: 27.9 % (ref 35.8–46.3)
HGB BLD-MCNC: 9.1 G/DL (ref 11.7–15.4)
MCH RBC QN AUTO: 28.2 PG (ref 26.1–32.9)
MCHC RBC AUTO-ENTMCNC: 32.6 G/DL (ref 31.4–35)
MCV RBC AUTO: 86.4 FL (ref 79.6–97.8)
NRBC # BLD: 0 K/UL (ref 0–0.2)
PLATELET # BLD AUTO: 264 K/UL (ref 150–450)
PMV BLD AUTO: 9.5 FL (ref 9.4–12.3)
RBC # BLD AUTO: 3.23 M/UL (ref 4.05–5.2)
WBC # BLD AUTO: 4.9 K/UL (ref 4.3–11.1)

## 2018-11-14 PROCEDURE — 74011250637 HC RX REV CODE- 250/637: Performed by: FAMILY MEDICINE

## 2018-11-14 PROCEDURE — 77030020263 HC SOL INJ SOD CL0.9% LFCR 1000ML

## 2018-11-14 PROCEDURE — 65270000029 HC RM PRIVATE

## 2018-11-14 PROCEDURE — 74011250637 HC RX REV CODE- 250/637: Performed by: HOSPITALIST

## 2018-11-14 PROCEDURE — 74011250636 HC RX REV CODE- 250/636: Performed by: INTERNAL MEDICINE

## 2018-11-14 PROCEDURE — 85027 COMPLETE CBC AUTOMATED: CPT

## 2018-11-14 PROCEDURE — 74011250637 HC RX REV CODE- 250/637: Performed by: INTERNAL MEDICINE

## 2018-11-14 PROCEDURE — 74011250636 HC RX REV CODE- 250/636: Performed by: HOSPITALIST

## 2018-11-14 PROCEDURE — 77030018836 HC SOL IRR NACL ICUM -A

## 2018-11-14 PROCEDURE — 36415 COLL VENOUS BLD VENIPUNCTURE: CPT

## 2018-11-14 RX ORDER — SENNOSIDES 8.6 MG/1
1 TABLET ORAL
Status: COMPLETED | OUTPATIENT
Start: 2018-11-14 | End: 2018-11-14

## 2018-11-14 RX ORDER — DOCUSATE SODIUM 100 MG/1
100 CAPSULE, LIQUID FILLED ORAL 2 TIMES DAILY
Status: DISCONTINUED | OUTPATIENT
Start: 2018-11-14 | End: 2018-11-16 | Stop reason: HOSPADM

## 2018-11-14 RX ADMIN — SENNOSIDES 8.6 MG: 8.6 TABLET, FILM COATED ORAL at 12:22

## 2018-11-14 RX ADMIN — Medication 10 ML: at 21:23

## 2018-11-14 RX ADMIN — CLINDAMYCIN PHOSPHATE 600 MG: 600 INJECTION, SOLUTION INTRAVENOUS at 03:22

## 2018-11-14 RX ADMIN — CLINDAMYCIN PHOSPHATE 600 MG: 600 INJECTION, SOLUTION INTRAVENOUS at 09:00

## 2018-11-14 RX ADMIN — DOCUSATE SODIUM 100 MG: 100 CAPSULE, LIQUID FILLED ORAL at 12:22

## 2018-11-14 RX ADMIN — METHADONE HYDROCHLORIDE 10 MG: 10 TABLET ORAL at 21:22

## 2018-11-14 RX ADMIN — DOCUSATE SODIUM 100 MG: 100 CAPSULE, LIQUID FILLED ORAL at 18:25

## 2018-11-14 RX ADMIN — CLINDAMYCIN PHOSPHATE 600 MG: 600 INJECTION, SOLUTION INTRAVENOUS at 20:15

## 2018-11-14 RX ADMIN — KETOROLAC TROMETHAMINE 15 MG: 15 INJECTION, SOLUTION INTRAMUSCULAR; INTRAVENOUS at 18:25

## 2018-11-14 RX ADMIN — CLINDAMYCIN PHOSPHATE 600 MG: 600 INJECTION, SOLUTION INTRAVENOUS at 14:11

## 2018-11-14 RX ADMIN — METHADONE HYDROCHLORIDE 10 MG: 10 TABLET ORAL at 05:57

## 2018-11-14 RX ADMIN — SODIUM CHLORIDE 100 ML/HR: 900 INJECTION, SOLUTION INTRAVENOUS at 05:55

## 2018-11-14 RX ADMIN — METHADONE HYDROCHLORIDE 10 MG: 10 TABLET ORAL at 14:00

## 2018-11-14 RX ADMIN — KETOROLAC TROMETHAMINE 15 MG: 15 INJECTION, SOLUTION INTRAMUSCULAR; INTRAVENOUS at 09:34

## 2018-11-14 RX ADMIN — ACETAMINOPHEN 650 MG: 325 TABLET, FILM COATED ORAL at 18:29

## 2018-11-14 NOTE — PROGRESS NOTES
Patient's IV infiltrated, patient hard stick, fluids encouraged while awaiting ER RN to come place IV.  
 
0115: MD Soham Foster aware patient awaiting IV. At bedside to see patient. 0130: ICU RN able to get IV into patient's right shoulder, fluids restarted.

## 2018-11-14 NOTE — PROGRESS NOTES
Hospitalist Progress Note 2018 Admit Date: 2018  7:55 PM  
NAME: Jeronimo Rodríguez :  1985 MRN:  [id number] Attending: Henry Carrillo MD 
PCP:  None SUBJECTIVE:  
Patient is a 27 yo with IV polysubstance abuse admitted for R arm cellulitis. Had apparent small abscess, I&D drained small amount of purulent material in ED. Nasal swab negative for MRSA; only SA DNA detected. Started on methadone for withdrawal symptoms. Arm sore today just after dressing changed. Overall not feeling much better but arm less red and less swelling. Does note no bowel movement since admit, no abdominal pain or cramping or n/v 
 
Review of Systems negative with exception of pertinent positives noted above PHYSICAL EXAM  
 
Visit Vitals BP 92/55 Pulse 93 Temp 98.9 °F (37.2 °C) Resp 16 Ht 5' 3\" (1.6 m) Wt 63.5 kg (140 lb) SpO2 97% Breastfeeding? No  
BMI 24.80 kg/m² Temp (24hrs), Av.2 °F (36.8 °C), Min:97.5 °F (36.4 °C), Max:98.9 °F (37.2 °C) Patient Vitals for the past 24 hrs: 
 Temp Pulse Resp BP SpO2  
18 0749 98.9 °F (37.2 °C) 93 16 92/55 97 % 18 0336 98.4 °F (36.9 °C) 91 16 95/58 96 % 18 2300 98.5 °F (36.9 °C) (!) 106 16 91/59 99 % 18 2000 98.3 °F (36.8 °C) 100 18 98/61 100 % 18 1541 97.5 °F (36.4 °C) 71 18 116/68 97 % 18 1142 97.8 °F (36.6 °C) 95 18 118/63 97 % Oxygen Therapy O2 Sat (%): 97 % (18 0749) Pulse via Oximetry: 101 beats per minute (18 2200) O2 Device: Room air (18) Intake/Output Summary (Last 24 hours) at 2018 1106 Last data filed at 2018 0900 Gross per 24 hour Intake 2141.67 ml Output  Net 2141.67 ml General: No acute distress   
Lungs:  CTA Bilaterally. Heart:  Regular rate and rhythm,  No murmur, rub, or gallop Abdomen: Soft, Non distended, Non tender, Positive bowel sounds Extremities: No cyanosis, clubbing or edema, R arm with packing to I&D site, no fluctuence palpated, surrounding erythema markedly decreased from yesterday, Induration still present at distal aspect significantly reduced from yesterday. Neurologic:  No focal deficits Recent Results (from the past 24 hour(s)) CBC W/O DIFF Collection Time: 11/14/18  4:21 AM  
Result Value Ref Range WBC 4.9 4.3 - 11.1 K/uL  
 RBC 3.23 (L) 4.05 - 5.2 M/uL HGB 9.1 (L) 11.7 - 15.4 g/dL HCT 27.9 (L) 35.8 - 46.3 % MCV 86.4 79.6 - 97.8 FL  
 MCH 28.2 26.1 - 32.9 PG  
 MCHC 32.6 31.4 - 35.0 g/dL  
 RDW 13.2 % PLATELET 985 061 - 297 K/uL MPV 9.5 9.4 - 12.3 FL ABSOLUTE NRBC 0.00 0.0 - 0.2 K/uL Imaging: XR ELBOW RT MIN 3 V Final Result IMPRESSION: Negative for acute fracture. ASSESSMENT Hospital Problems as of 11/14/2018 Never Reviewed Codes Class Noted - Resolved POA Drug abuse ([facility] [address] ICD-10-CM: F19.10 ICD-9-CM: 305.90  11/12/2018 - Present Yes * (Principal) Cellulitis of right arm ICD-10-CM: L03.113 ICD-9-CM: 682.3  11/11/2018 - Present Yes Plan: 
· Continue IV clindamycin. Nasal swab negative for MRSA. Area clinically improving. Current plan change to PO clinda for possible discharge tomorrow. Dressing changed by wound care today; appreciate their assistance. · Heroin withdrawal: methadone 10 mg TID. Pt interested in methadone treatment and sobriety after discharge. Added bowel regimen. DVT Prophylaxis: SCDs Signed By: Michelle Avila MD   
 November 14, 2018

## 2018-11-14 NOTE — WOUND CARE
Pt seen for I and D site on right AC. I and D performed in ER, pt states she has been an IV drug user for 6 years but plans to continue with methadone treatment once she is discharged from the hospital. Removed old dressing of Tegaderm, 4x4 and iodosorb 1/2 inch packing. Wound measuring 1x 1.5 x 0.5 cm moist pale and pink wound bed with scant amount of sangiunous drainage noted. Quiana wound skin noted with erythema, warmth and swelling. Pt states it is very painful to move and tough. Cleansed with normal saline, packed with 0.5 inch iodosorb packing, covered with 4x4 and wrapped with with kerlix gauze. Recommend dressing changes daily and as needed. Will continue to follow.

## 2018-11-14 NOTE — PROGRESS NOTES
Pt resting in bed, call light within reach, side rails up x 3, and bed low and locked. Shift assessment complete. Dressing clean, dry and intact. Pt c/o right arm pain after dressing change, Toradol administered for pain.

## 2018-11-14 NOTE — PROGRESS NOTES
Patient alert and orientated X4, Complaint of right arm pain, given prn ultram and toradol with good relief. Dressing to right arm C/D/I. IVF infusing overnight. OOB ad selena. IV antibiotics continued. Bed locked and low, call bell within reach, hourly rounds performed. Will continue to monitor and report to oncoming RN.

## 2018-11-14 NOTE — WOUND CARE
Wound care treatment plan discussed with wound care team member. Wound team will monitor and adjust treatment as needed.

## 2018-11-15 ENCOUNTER — APPOINTMENT (OUTPATIENT)
Dept: ULTRASOUND IMAGING | Age: 33
DRG: 580 | End: 2018-11-15
Attending: FAMILY MEDICINE
Payer: SUBSIDIZED

## 2018-11-15 LAB
APTT PPP: 132.4 SEC (ref 23.2–35.3)
APTT PPP: >200 SEC (ref 23.2–35.3)
BASOPHILS # BLD: 0 K/UL (ref 0–0.2)
BASOPHILS NFR BLD: 1 % (ref 0–2)
DIFFERENTIAL METHOD BLD: ABNORMAL
EOSINOPHIL # BLD: 0.2 K/UL (ref 0–0.8)
EOSINOPHIL NFR BLD: 4 % (ref 0.5–7.8)
ERYTHROCYTE [DISTWIDTH] IN BLOOD BY AUTOMATED COUNT: 13.2 %
ERYTHROCYTE [DISTWIDTH] IN BLOOD BY AUTOMATED COUNT: 13.2 %
HCT VFR BLD AUTO: 29.7 % (ref 35.8–46.3)
HCT VFR BLD AUTO: 34.8 % (ref 35.8–46.3)
HGB BLD-MCNC: 11.1 G/DL (ref 11.7–15.4)
HGB BLD-MCNC: 9.3 G/DL (ref 11.7–15.4)
IMM GRANULOCYTES # BLD: 0 K/UL (ref 0–0.5)
IMM GRANULOCYTES NFR BLD AUTO: 0 % (ref 0–5)
LYMPHOCYTES # BLD: 2 K/UL (ref 0.5–4.6)
LYMPHOCYTES NFR BLD: 41 % (ref 13–44)
MCH RBC QN AUTO: 27.5 PG (ref 26.1–32.9)
MCH RBC QN AUTO: 27.7 PG (ref 26.1–32.9)
MCHC RBC AUTO-ENTMCNC: 31.3 G/DL (ref 31.4–35)
MCHC RBC AUTO-ENTMCNC: 31.9 G/DL (ref 31.4–35)
MCV RBC AUTO: 86.1 FL (ref 79.6–97.8)
MCV RBC AUTO: 88.4 FL (ref 79.6–97.8)
MONOCYTES # BLD: 0.5 K/UL (ref 0.1–1.3)
MONOCYTES NFR BLD: 11 % (ref 4–12)
NEUTS SEG # BLD: 2.1 K/UL (ref 1.7–8.2)
NEUTS SEG NFR BLD: 44 % (ref 43–78)
NRBC # BLD: 0 K/UL (ref 0–0.2)
NRBC # BLD: 0 K/UL (ref 0–0.2)
PLATELET # BLD AUTO: 227 K/UL (ref 150–450)
PLATELET # BLD AUTO: 319 K/UL (ref 150–450)
PMV BLD AUTO: 8.9 FL (ref 9.4–12.3)
PMV BLD AUTO: 9.2 FL (ref 9.4–12.3)
RBC # BLD AUTO: 3.36 M/UL (ref 4.05–5.2)
RBC # BLD AUTO: 4.04 M/UL (ref 4.05–5.2)
WBC # BLD AUTO: 3.6 K/UL (ref 4.3–11.1)
WBC # BLD AUTO: 4.8 K/UL (ref 4.3–11.1)

## 2018-11-15 PROCEDURE — 77030020263 HC SOL INJ SOD CL0.9% LFCR 1000ML

## 2018-11-15 PROCEDURE — 85027 COMPLETE CBC AUTOMATED: CPT

## 2018-11-15 PROCEDURE — 74011250637 HC RX REV CODE- 250/637: Performed by: FAMILY MEDICINE

## 2018-11-15 PROCEDURE — 85025 COMPLETE CBC W/AUTO DIFF WBC: CPT

## 2018-11-15 PROCEDURE — 74011250637 HC RX REV CODE- 250/637: Performed by: HOSPITALIST

## 2018-11-15 PROCEDURE — 93971 EXTREMITY STUDY: CPT

## 2018-11-15 PROCEDURE — 85730 THROMBOPLASTIN TIME PARTIAL: CPT

## 2018-11-15 PROCEDURE — 36416 COLLJ CAPILLARY BLOOD SPEC: CPT

## 2018-11-15 PROCEDURE — 74011250636 HC RX REV CODE- 250/636: Performed by: FAMILY MEDICINE

## 2018-11-15 PROCEDURE — 65270000029 HC RM PRIVATE

## 2018-11-15 PROCEDURE — 74011250636 HC RX REV CODE- 250/636: Performed by: INTERNAL MEDICINE

## 2018-11-15 PROCEDURE — 74011250637 HC RX REV CODE- 250/637: Performed by: INTERNAL MEDICINE

## 2018-11-15 PROCEDURE — 74011250636 HC RX REV CODE- 250/636: Performed by: HOSPITALIST

## 2018-11-15 RX ORDER — HEPARIN SODIUM 5000 [USP'U]/100ML
18-36 INJECTION, SOLUTION INTRAVENOUS
Status: DISCONTINUED | OUTPATIENT
Start: 2018-11-15 | End: 2018-11-16

## 2018-11-15 RX ORDER — HEPARIN SODIUM 5000 [USP'U]/ML
80 INJECTION, SOLUTION INTRAVENOUS; SUBCUTANEOUS ONCE
Status: COMPLETED | OUTPATIENT
Start: 2018-11-15 | End: 2018-11-15

## 2018-11-15 RX ORDER — CLINDAMYCIN HYDROCHLORIDE 150 MG/1
300 CAPSULE ORAL 4 TIMES DAILY
Status: DISCONTINUED | OUTPATIENT
Start: 2018-11-15 | End: 2018-11-16 | Stop reason: HOSPADM

## 2018-11-15 RX ADMIN — KETOROLAC TROMETHAMINE 15 MG: 15 INJECTION, SOLUTION INTRAMUSCULAR; INTRAVENOUS at 04:58

## 2018-11-15 RX ADMIN — ZOLPIDEM TARTRATE 5 MG: 5 TABLET ORAL at 22:26

## 2018-11-15 RX ADMIN — METHADONE HYDROCHLORIDE 10 MG: 10 TABLET ORAL at 14:08

## 2018-11-15 RX ADMIN — HEPARIN SODIUM AND DEXTROSE 18 UNITS/KG/HR: 5000; 5 INJECTION INTRAVENOUS at 14:13

## 2018-11-15 RX ADMIN — CLINDAMYCIN HYDROCHLORIDE 300 MG: 150 CAPSULE ORAL at 22:22

## 2018-11-15 RX ADMIN — METHADONE HYDROCHLORIDE 10 MG: 10 TABLET ORAL at 05:01

## 2018-11-15 RX ADMIN — METHADONE HYDROCHLORIDE 10 MG: 10 TABLET ORAL at 22:22

## 2018-11-15 RX ADMIN — CLINDAMYCIN PHOSPHATE 600 MG: 600 INJECTION, SOLUTION INTRAVENOUS at 08:52

## 2018-11-15 RX ADMIN — HEPARIN SODIUM 5100 UNITS: 5000 INJECTION INTRAVENOUS; SUBCUTANEOUS at 14:09

## 2018-11-15 RX ADMIN — CLINDAMYCIN HYDROCHLORIDE 300 MG: 150 CAPSULE ORAL at 17:43

## 2018-11-15 RX ADMIN — Medication 10 ML: at 22:00

## 2018-11-15 RX ADMIN — KETOROLAC TROMETHAMINE 15 MG: 15 INJECTION, SOLUTION INTRAMUSCULAR; INTRAVENOUS at 17:44

## 2018-11-15 RX ADMIN — HEPARIN SODIUM AND DEXTROSE 16 UNITS/KG/HR: 5000; 5 INJECTION INTRAVENOUS at 22:19

## 2018-11-15 RX ADMIN — SODIUM CHLORIDE 100 ML/HR: 900 INJECTION, SOLUTION INTRAVENOUS at 03:15

## 2018-11-15 RX ADMIN — ACETAMINOPHEN 650 MG: 325 TABLET, FILM COATED ORAL at 08:52

## 2018-11-15 RX ADMIN — DOCUSATE SODIUM 100 MG: 100 CAPSULE, LIQUID FILLED ORAL at 08:52

## 2018-11-15 RX ADMIN — PROMETHAZINE HYDROCHLORIDE 25 MG: 25 TABLET ORAL at 17:47

## 2018-11-15 RX ADMIN — CLINDAMYCIN PHOSPHATE 600 MG: 600 INJECTION, SOLUTION INTRAVENOUS at 14:26

## 2018-11-15 RX ADMIN — TRAMADOL HYDROCHLORIDE 50 MG: 50 TABLET, FILM COATED ORAL at 22:27

## 2018-11-15 RX ADMIN — CLINDAMYCIN PHOSPHATE 600 MG: 600 INJECTION, SOLUTION INTRAVENOUS at 03:15

## 2018-11-15 RX ADMIN — DOCUSATE SODIUM 100 MG: 100 CAPSULE, LIQUID FILLED ORAL at 17:44

## 2018-11-15 RX ADMIN — TRAMADOL HYDROCHLORIDE 50 MG: 50 TABLET, FILM COATED ORAL at 09:59

## 2018-11-15 NOTE — PROGRESS NOTES
Hospitalist Progress Note 11/15/2018 Admit Date: 2018  7:55 PM  
NAME: Steffen Luna :  1985 MRN:  [id number] Attending: Jared Ramos MD 
PCP:  None SUBJECTIVE:  
Patient is a 27 yo with IV polysubstance abuse admitted for R arm cellulitis. Had apparent small abscess, I&D drained small amount of purulent material in ED. Nasal swab negative for MRSA; only SA DNA detected. Started on methadone for withdrawal symptoms. Pt noted increased swelling of R arm overnight after it had been improving. Erythema unchanged. Arm is more sore today than yesterday, some tingling in hand. Review of Systems negative with exception of pertinent positives noted above PHYSICAL EXAM  
 
Visit Vitals /69 Pulse 79 Temp 98 °F (36.7 °C) Resp 18 Ht 5' 3\" (1.6 m) Wt 63.5 kg (140 lb) SpO2 99% Breastfeeding? No  
BMI 24.80 kg/m² Temp (24hrs), Av.5 °F (36.9 °C), Min:98 °F (36.7 °C), Max:99.3 °F (37.4 °C) Patient Vitals for the past 24 hrs: 
 Temp Pulse Resp BP SpO2  
11/15/18 1213 98 °F (36.7 °C) 79 18 103/69 99 % 11/15/18 0733 98.2 °F (36.8 °C) 83 20 90/60 97 % 11/15/18 0315 99.3 °F (37.4 °C) 88 16 99/54 97 % 18 2336 98.2 °F (36.8 °C) 87 18 102/64 98 % 18 98.5 °F (36.9 °C) 100 18 96/80 98 % 18 1515 98.6 °F (37 °C) 85 18 95/61 95 % Oxygen Therapy O2 Sat (%): 99 % (11/15/18 1213) Pulse via Oximetry: 101 beats per minute (18 2200) O2 Device: Room air (18) Intake/Output Summary (Last 24 hours) at 11/15/2018 1421 Last data filed at 11/15/2018 1213 Gross per 24 hour Intake 1160 ml Output  Net 1160 ml General: No acute distress   
Lungs:  CTA Bilaterally. Heart:  Regular rate and rhythm,  No murmur, rub, or gallop Abdomen: Soft, Non distended, Non tender, Positive bowel sounds Extremities: R arm with packing to I&D site, edema of forearm and hand. Minimal erythema. Area of induration still present distal to incision site. Neurologic:  No focal deficits Recent Results (from the past 24 hour(s)) CBC W/O DIFF Collection Time: 11/15/18  4:26 AM  
Result Value Ref Range WBC 3.6 (L) 4.3 - 11.1 K/uL  
 RBC 4.04 (L) 4.05 - 5.2 M/uL  
 HGB 11.1 (L) 11.7 - 15.4 g/dL HCT 34.8 (L) 35.8 - 46.3 % MCV 86.1 79.6 - 97.8 FL  
 MCH 27.5 26.1 - 32.9 PG  
 MCHC 31.9 31.4 - 35.0 g/dL  
 RDW 13.2 % PLATELET 075 695 - 902 K/uL MPV 9.2 (L) 9.4 - 12.3 FL ABSOLUTE NRBC 0.00 0.0 - 0.2 K/uL Imaging:   
DUPLEX UPPER EXT VENOUS RIGHT Final Result Impression: Findings consistent with right upper extremity deep venous  
thrombosis. XR ELBOW RT MIN 3 V Final Result IMPRESSION: Negative for acute fracture. ASSESSMENT Hospital Problems as of 11/15/2018 Never Reviewed Codes Class Noted - Resolved POA Drug abuse ([facility] [address] ICD-10-CM: F19.10 ICD-9-CM: 305.90  11/12/2018 - Present Yes * (Principal) Cellulitis of right arm ICD-10-CM: L03.113 ICD-9-CM: 682.3  11/11/2018 - Present Yes Plan: 
 
RUE DVT: secondary to abscess. Start heparin drip, consult IR for consideration of thrombolysis. If not indicated, transition to oral anticoagulant. Abscess, cellulitis: improving. Continue IV clindamycin. Can transition to PO at discharge. Wound care following. Heroin withdrawal: methadone 10 mg TID. Pt interested in methadone treatment and sobriety after discharge. Added bowel regimen. DVT Prophylaxis: SCDs Signed By: Beata Canchola MD   
 November 15, 2018

## 2018-11-15 NOTE — PROGRESS NOTES
Shift assessment completed. Pt is alert & oriented x4. Able to verbalize needs. Resting quietly with no distress noted. Dressing to right arm is clean, dry and intact. Bed low and locked. Call light within reach. Patient instructed to call for assistance. Pt verbalizes understanding. Will monitor.

## 2018-11-15 NOTE — PROGRESS NOTES
RN received request from patient to have MD look at her hand. Patient states hand has swollen more overnight and is hurting more. Hand appears more swollen with +2 edema and non-pitting. RN paged Hospitalist and received call back from Dr. Maude Bautista. Per Dr. Maude Bautista, RN should call back during day shift and have day hospitalist assess patient. Patient notified. RN will continue to monitor.

## 2018-11-15 NOTE — PROGRESS NOTES
Daily dressing change complete. Packed with Iodoform guaze, covered with 2 x 2 and Tegaderm for easy viewing in ultrasound.

## 2018-11-15 NOTE — PROGRESS NOTES
Shift assessment complete. Pt resting in recliner. Pt c/o swollen right hand, hand is not hot only warm. Pt is not in severe pain, and has a radial pulse. Removed wrap from dressing change the day before to reevaluate in a bit.

## 2018-11-15 NOTE — CONSULTS
Department of Interventional Radiology  [phone]        Consult Note     Patient: Robbie Parrish MRN: [id number]  SSN: [id number]    YOB: 1985  Age: 26 y.o. Sex: female      Referring Physician: Dr. Lay Bryant Date: 11/15/2018     Pt not seen. History obtained from chart review. Pt presented with RUE swelling, redness and pain. She is being treated for cellulitis, abscess. IVDA, heroin/meth. Febrile yesterday. US demonstrated edema and DVT right brachial, cephalic, radial and ulnar veins. Visit Vitals  /69   Pulse 79   Temp 98 °F (36.7 °C)   Resp 18   Ht 5' 3\" (1.6 m)   Wt 63.5 kg (140 lb)   SpO2 99%   Breastfeeding? No   BMI 24.80 kg/m²         Assessment/Plan:   RUE DVT-due to the likelihood that the thrombus is infected, catheter directed intervention is contraindicated. Recommend extremity elevation and antibiotic therapy. Compression garment may be helpful once infection is treated.       Marry Cuevas PA-C    Principal Problem:    Cellulitis of right arm (11/11/2018)    Active Problems:    Drug abuse ([facility] [address] (11/12/2018)

## 2018-11-16 VITALS
HEIGHT: 63 IN | RESPIRATION RATE: 14 BRPM | WEIGHT: 140 LBS | TEMPERATURE: 98.4 F | BODY MASS INDEX: 24.8 KG/M2 | HEART RATE: 71 BPM | OXYGEN SATURATION: 90 % | DIASTOLIC BLOOD PRESSURE: 71 MMHG | SYSTOLIC BLOOD PRESSURE: 106 MMHG

## 2018-11-16 LAB
APTT PPP: 26.3 SEC (ref 23.2–35.3)
APTT PPP: 51.3 SEC (ref 23.2–35.3)
BACTERIA SPEC CULT: ABNORMAL
BACTERIA SPEC CULT: ABNORMAL
BACTERIA SPEC CULT: NORMAL
BASOPHILS # BLD: 0 K/UL (ref 0–0.2)
BASOPHILS NFR BLD: 1 % (ref 0–2)
DIFFERENTIAL METHOD BLD: ABNORMAL
EOSINOPHIL # BLD: 0.2 K/UL (ref 0–0.8)
EOSINOPHIL NFR BLD: 4 % (ref 0.5–7.8)
ERYTHROCYTE [DISTWIDTH] IN BLOOD BY AUTOMATED COUNT: 13.2 %
GRAM STN SPEC: ABNORMAL
HCT VFR BLD AUTO: 30.6 % (ref 35.8–46.3)
HGB BLD-MCNC: 9.8 G/DL (ref 11.7–15.4)
IMM GRANULOCYTES # BLD: 0 K/UL (ref 0–0.5)
IMM GRANULOCYTES NFR BLD AUTO: 0 % (ref 0–5)
LYMPHOCYTES # BLD: 1.9 K/UL (ref 0.5–4.6)
LYMPHOCYTES NFR BLD: 42 % (ref 13–44)
MCH RBC QN AUTO: 27.7 PG (ref 26.1–32.9)
MCHC RBC AUTO-ENTMCNC: 32 G/DL (ref 31.4–35)
MCV RBC AUTO: 86.4 FL (ref 79.6–97.8)
MONOCYTES # BLD: 0.4 K/UL (ref 0.1–1.3)
MONOCYTES NFR BLD: 8 % (ref 4–12)
NEUTS SEG # BLD: 2 K/UL (ref 1.7–8.2)
NEUTS SEG NFR BLD: 45 % (ref 43–78)
NRBC # BLD: 0 K/UL (ref 0–0.2)
PLATELET # BLD AUTO: 350 K/UL (ref 150–450)
PMV BLD AUTO: 8.9 FL (ref 9.4–12.3)
RBC # BLD AUTO: 3.54 M/UL (ref 4.05–5.2)
SERVICE CMNT-IMP: ABNORMAL
SERVICE CMNT-IMP: NORMAL
WBC # BLD AUTO: 4.6 K/UL (ref 4.3–11.1)

## 2018-11-16 PROCEDURE — 74011250636 HC RX REV CODE- 250/636: Performed by: FAMILY MEDICINE

## 2018-11-16 PROCEDURE — 36415 COLL VENOUS BLD VENIPUNCTURE: CPT

## 2018-11-16 PROCEDURE — 74011250637 HC RX REV CODE- 250/637: Performed by: INTERNAL MEDICINE

## 2018-11-16 PROCEDURE — 74011250637 HC RX REV CODE- 250/637: Performed by: HOSPITALIST

## 2018-11-16 PROCEDURE — 74011250637 HC RX REV CODE- 250/637: Performed by: FAMILY MEDICINE

## 2018-11-16 PROCEDURE — 74011250636 HC RX REV CODE- 250/636: Performed by: HOSPITALIST

## 2018-11-16 PROCEDURE — 77030019895 HC PCKNG STRP IODO -A

## 2018-11-16 PROCEDURE — 85730 THROMBOPLASTIN TIME PARTIAL: CPT

## 2018-11-16 PROCEDURE — 85025 COMPLETE CBC W/AUTO DIFF WBC: CPT

## 2018-11-16 RX ORDER — HEPARIN SODIUM 5000 [USP'U]/ML
35 INJECTION, SOLUTION INTRAVENOUS; SUBCUTANEOUS ONCE
Status: DISCONTINUED | OUTPATIENT
Start: 2018-11-16 | End: 2018-11-16

## 2018-11-16 RX ORDER — HEPARIN SODIUM 5000 [USP'U]/100ML
35 INJECTION, SOLUTION INTRAVENOUS
Status: DISCONTINUED | OUTPATIENT
Start: 2018-11-16 | End: 2018-11-16

## 2018-11-16 RX ORDER — HEPARIN SODIUM 5000 [USP'U]/ML
35 INJECTION, SOLUTION INTRAVENOUS; SUBCUTANEOUS ONCE
Status: COMPLETED | OUTPATIENT
Start: 2018-11-16 | End: 2018-11-16

## 2018-11-16 RX ORDER — CLINDAMYCIN HYDROCHLORIDE 300 MG/1
300 CAPSULE ORAL 4 TIMES DAILY
Qty: 16 CAP | Refills: 0 | Status: SHIPPED | OUTPATIENT
Start: 2018-11-16 | End: 2019-02-01

## 2018-11-16 RX ADMIN — PROMETHAZINE HYDROCHLORIDE 25 MG: 25 TABLET ORAL at 03:22

## 2018-11-16 RX ADMIN — DOCUSATE SODIUM 100 MG: 100 CAPSULE, LIQUID FILLED ORAL at 09:37

## 2018-11-16 RX ADMIN — ACETAMINOPHEN 650 MG: 325 TABLET, FILM COATED ORAL at 04:20

## 2018-11-16 RX ADMIN — METHADONE HYDROCHLORIDE 10 MG: 10 TABLET ORAL at 06:20

## 2018-11-16 RX ADMIN — TRAMADOL HYDROCHLORIDE 50 MG: 50 TABLET, FILM COATED ORAL at 09:37

## 2018-11-16 RX ADMIN — Medication 10 ML: at 06:29

## 2018-11-16 RX ADMIN — Medication 10 ML: at 06:26

## 2018-11-16 RX ADMIN — RIVAROXABAN 15 MG: 15 TABLET, FILM COATED ORAL at 11:35

## 2018-11-16 RX ADMIN — HEPARIN SODIUM 2200 UNITS: 5000 INJECTION INTRAVENOUS; SUBCUTANEOUS at 06:23

## 2018-11-16 RX ADMIN — KETOROLAC TROMETHAMINE 15 MG: 15 INJECTION, SOLUTION INTRAMUSCULAR; INTRAVENOUS at 03:14

## 2018-11-16 RX ADMIN — HEPARIN SODIUM AND DEXTROSE 18 UNITS/KG/HR: 5000; 5 INJECTION INTRAVENOUS at 06:21

## 2018-11-16 RX ADMIN — CLINDAMYCIN HYDROCHLORIDE 300 MG: 150 CAPSULE ORAL at 09:37

## 2018-11-16 NOTE — PROGRESS NOTES
Patient resting in bed. PIV infusing. Right arm swollen, palpable pulse. Dressing change to right arm. Packed with iodoform guaze, covered with 2x2 and tegaderm. Complain of pain, given ultram. Appetite fair. Bed is low and locked. Call light within reach.

## 2018-11-16 NOTE — PROGRESS NOTES
Discharge instructions were reviewed with the patient. Follow up appts and instructions given. S/s to return to ER for explained. Opportunity for questions was given. Pt asked about Methadone clinic. She was given names/numbers to clinics by ADIN and will call them for appt. Wound care instructions given by primary RN. Patient verbalized understanding of discharge and follow up instructions. PIV was removed. Prescriptions provided along with voucher for Xarelto. Pt to discharge home when ride gets here.

## 2018-11-16 NOTE — DISCHARGE INSTRUCTIONS
Wound care: Cleanse right arm wound with normal saline, pack wound with 0.5 inch iodosorb packing, cover with 4x4 gauze and wrap with gerald gauze wrap. Change dressing daily and as needed. DISCHARGE SUMMARY from Nurse    PATIENT INSTRUCTIONS:    After general anesthesia or intravenous sedation, for 24 hours or while taking prescription Narcotics:  · Limit your activities  · Do not drive and operate hazardous machinery  · Do not make important personal or business decisions  · Do  not drink alcoholic beverages  · If you have not urinated within 8 hours after discharge, please contact your surgeon on call. Report the following to your surgeon:  · Excessive pain, swelling, redness or odor of or around the surgical area  · Temperature over 100.5  · Nausea and vomiting lasting longer than 4 hours or if unable to take medications  · Any signs of decreased circulation or nerve impairment to extremity: change in color, persistent  numbness, tingling, coldness or increase pain  · Any questions    What to do at Home:  Recommended activity: Activity as tolerated, follow up at [facility] in 1 week, take meds as prescribed     If you experience any of the following symptoms increased redness, swelling, warmth, pain, fever, please follow up with [facility] or return to ER. *  Please give a list of your current medications to your Primary Care Provider. *  Please update this list whenever your medications are discontinued, doses are      changed, or new medications (including over-the-counter products) are added. *  Please carry medication information at all times in case of emergency situations. These are general instructions for a healthy lifestyle:    No smoking/ No tobacco products/ Avoid exposure to second hand smoke  Surgeon General's Warning:  Quitting smoking now greatly reduces serious risk to your health.     Obesity, smoking, and sedentary lifestyle greatly increases your risk for illness    A healthy diet, regular physical exercise & weight monitoring are important for maintaining a healthy lifestyle    You may be retaining fluid if you have a history of heart failure or if you experience any of the following symptoms:  Weight gain of 3 pounds or more overnight or 5 pounds in a week, increased swelling in our hands or feet or shortness of breath while lying flat in bed. Please call your doctor as soon as you notice any of these symptoms; do not wait until your next office visit. Recognize signs and symptoms of STROKE:    F-face looks uneven    A-arms unable to move or move unevenly    S-speech slurred or non-existent    T-time-call 911 as soon as signs and symptoms begin-DO NOT go       Back to bed or wait to see if you get better-TIME IS BRAIN. Warning Signs of HEART ATTACK     Call 911 if you have these symptoms:   Chest discomfort. Most heart attacks involve discomfort in the center of the chest that lasts more than a few minutes, or that goes away and comes back. It can feel like uncomfortable pressure, squeezing, fullness, or pain.  Discomfort in other areas of the upper body. Symptoms can include pain or discomfort in one or both arms, the back, neck, jaw, or stomach.  Shortness of breath with or without chest discomfort.  Other signs may include breaking out in a cold sweat, nausea, or lightheadedness. Don't wait more than five minutes to call 911 - MINUTES MATTER! Fast action can save your life. Calling 911 is almost always the fastest way to get lifesaving treatment. Emergency Medical Services staff can begin treatment when they arrive -- up to an hour sooner than if someone gets to the hospital by car. The discharge information has been reviewed with the patient. The patient verbalized understanding.   Discharge medications reviewed with the patient and appropriate educational materials and side effects teaching were provided. ___________________________________________________________________________________________________________________________________           Cellulitis: Care Instructions  Your Care Instructions    Cellulitis is a skin infection caused by bacteria, most often strep or staph. It often occurs after a break in the skin from a scrape, cut, bite, or puncture, or after a rash. Cellulitis may be treated without doing tests to find out what caused it. But your doctor may do tests, if needed, to look for a specific bacteria, like methicillin-resistant Staphylococcus aureus (MRSA). The doctor has checked you carefully, but problems can develop later. If you notice any problems or new symptoms, get medical treatment right away. Follow-up care is a key part of your treatment and safety. Be sure to make and go to all appointments, and call your doctor if you are having problems. It's also a good idea to know your test results and keep a list of the medicines you take. How can you care for yourself at home? · Take your antibiotics as directed. Do not stop taking them just because you feel better. You need to take the full course of antibiotics. · Prop up the infected area on pillows to reduce pain and swelling. Try to keep the area above the level of your heart as often as you can. · If your doctor told you how to care for your wound, follow your doctor's instructions. If you did not get instructions, follow this general advice:  ? Wash the wound with clean water 2 times a day. Don't use hydrogen peroxide or alcohol, which can slow healing. ? You may cover the wound with a thin layer of petroleum jelly, such as Vaseline, and a nonstick bandage. ? Apply more petroleum jelly and replace the bandage as needed. · Be safe with medicines. Take pain medicines exactly as directed. ? If the doctor gave you a prescription medicine for pain, take it as prescribed.   ? If you are not taking a prescription pain medicine, ask your doctor if you can take an over-the-counter medicine. To prevent cellulitis in the future  · Try to prevent cuts, scrapes, or other injuries to your skin. Cellulitis most often occurs where there is a break in the skin. · If you get a scrape, cut, mild burn, or bite, wash the wound with clean water as soon as you can to help avoid infection. Don't use hydrogen peroxide or alcohol, which can slow healing. · If you have swelling in your legs (edema), support stockings and good skin care may help prevent leg sores and cellulitis. · Take care of your feet, especially if you have diabetes or other conditions that increase the risk of infection. Wear shoes and socks. Do not go barefoot. If you have athlete's foot or other skin problems on your feet, talk to your doctor about how to treat them. When should you call for help? Call your doctor now or seek immediate medical care if:    · You have signs that your infection is getting worse, such as:  ? Increased pain, swelling, warmth, or redness. ? Red streaks leading from the area. ? Pus draining from the area. ? A fever.     · You get a rash.    Watch closely for changes in your health, and be sure to contact your doctor if:    · You do not get better as expected. Where can you learn more? Go to http://julita-ariana.info/. Meek Sanz in the search box to learn more about \"Cellulitis: Care Instructions. \"  Current as of: April 18, 2018  Content Version: 11.8  © 8600-7565 [employer]. Care instructions adapted under license by [employer] (which disclaims liability or warranty for this information). If you have questions about a medical condition or this instruction, always ask your healthcare professional. [place] [address] any warranty or liability for your use of this information.          Deep Vein Thrombosis: Care Instructions  Your Care Instructions    A deep vein thrombosis (DVT) is a blood clot in certain veins of the legs, pelvis, or arms. Blood clots in these veins need to be treated because they can get bigger, break loose, and travel through the bloodstream to the lungs. A blood clot in a lung can be life-threatening. The doctor may have given you a blood thinner (anticoagulant). A blood thinner can stop the blood clot from growing larger and prevent new clots from forming. You will need to take a blood thinner for 3 to 6 months or longer. The doctor has checked you carefully, but problems can develop later. If you notice any problems or new symptoms, get medical treatment right away. Follow-up care is a key part of your treatment and safety. Be sure to make and go to all appointments, and call your doctor if you are having problems. It's also a good idea to know your test results and keep a list of the medicines you take. How can you care for yourself at home? · Take your medicines exactly as prescribed. Call your doctor if you think you are having a problem with your medicine. · If you are taking a blood thinner, be sure you get instructions about how to take your medicine safely. Blood thinners can cause serious bleeding problems. · Wear compression stockings if your doctor recommends them. These stockings are tighter at the feet than on the legs. They may reduce pain and swelling in your legs. But there are different types of stockings, and they need to fit right. So your doctor will recommend what you need. · When you sit, use a pillow to raise the arm or leg that has the blood clot. Try to keep it above the level of your heart. When should you call for help? Call 911 anytime you think you may need emergency care. For example, call if:    · You passed out (lost consciousness).     · You have symptoms of a blood clot in your lung (called a pulmonary embolism). These include:  ? Sudden chest pain. ? Trouble breathing. ?  Coughing up blood.    Call your doctor now or seek immediate medical care if:    · You have new or worse trouble breathing.     · You are dizzy or lightheaded, or you feel like you may faint.     · You have symptoms of a blood clot in your arm or leg. These may include:  ? Pain in the arm, calf, back of the knee, thigh, or groin. ? Redness and swelling in the arm, leg, or groin.    Watch closely for changes in your health, and be sure to contact your doctor if:    · You do not get better as expected. Where can you learn more? Go to http://julita-ariana.info/. Enter T755 in the search box to learn more about \"Deep Vein Thrombosis: Care Instructions. \"  Current as of: November 21, 2017  Content Version: 11.8  © 0659-4024 [employer]. Care instructions adapted under license by [employer] (which disclaims liability or warranty for this information). If you have questions about a medical condition or this instruction, always ask your healthcare professional. [place] [address] any warranty or liability for your use of this information.

## 2018-11-16 NOTE — PROGRESS NOTES
11:30am Cm advise pt that need check stubs and ID to complete [facility] application. Pt states that she would have her bf fax over the information. CM provided pt with fax number. CM have not received documents that are needed to process application. Cm called pt twice, but phone continually rings, but no one answers.

## 2018-11-16 NOTE — DISCHARGE SUMMARY
Hospitalist Discharge Summary     Patient ID:  Melodie Tobni  [id number]  45 y.o.  1985  Admit date: 11/11/2018  7:55 PM  Discharge date and time: 11/16/2018  Attending: Cristine Piper MD  PCP:  None  Treatment Team: Attending Provider: Cristine Piper MD; Care Manager: Betsyelio Watkinsplacido    Principal Diagnosis Cellulitis of right arm   Principal Problem:    Cellulitis of right arm (11/11/2018)    Active Problems:    Drug abuse ([address] (11/12/2018)             Hospital Course:  Please refer to the admission H&P for details of presentation. In summary, the patient is a 25 yo with IV polysubstance abuse admitted for R arm cellulitis. Had apparent small abscess, I&D drained small amount of purulent material in ED. She was started on clindamycin and had a negative MRSA nasal screen. The abscess was clinically improving when she developed worsening swelling and pain of arm. She was found to have DVT in RUE. She was determined not to be a candidate for thrombolysis due to potential for infection in clot, so she was started on xarelto for 3mo treatment of provoked DVT. She experienced withdrawal from opioids during stay which was managed with methadone. She expresses desire to get into outpatient medication assisted treatment and case management advised her on this as well as setting her up with primary care. Significant Diagnostic Studies:   DUPLEX UPPER EXT VENOUS RIGHT   Final Result   Impression: Findings consistent with right upper extremity deep venous   thrombosis. XR ELBOW RT MIN 3 V   Final Result   IMPRESSION: Negative for acute fracture. Labs: Results:       Chemistry No results for input(s): GLU, NA, K, CL, CO2, BUN, CREA, CA, AGAP, BUCR, TBIL, GPT, AP, TP, ALB, GLOB, AGRAT in the last 72 hours.    CBC w/Diff Recent Labs     11/16/18  0435 11/15/18  1458 11/15/18  0426   WBC 4.6 4.8 3.6*   RBC 3.54* 3.36* 4.04*   HGB 9.8* 9.3* 11.1*   HCT 30.6* 29.7* 34.8*    319 227   GRANS 45 44  --    LYMPH 42 41  --    EOS 4 4  --       Cardiac Enzymes No results for input(s): CPK, CKND1, NO in the last 72 hours. No lab exists for component: CKRMB, TROIP   Coagulation Recent Labs     11/16/18  0435 11/15/18  2040   APTT 51.3* 132.4*       Lipid Panel No results found for: CHOL, CHOLPOCT, CHOLX, CHLST, CHOLV, 049142, HDL, LDL, LDLC, DLDLP, 402217, VLDLC, VLDL, TGLX, TRIGL, TRIGP, TGLPOCT, CHHD, CHHDX   BNP No results for input(s): BNPP in the last 72 hours. Liver Enzymes No results for input(s): TP, ALB, TBIL, AP, SGOT, GPT in the last 72 hours. No lab exists for component: DBIL   Thyroid Studies No results found for: T4, T3U, TSH, TSHEXT         Discharge Exam:  Visit Vitals  /71   Pulse 71   Temp 98.4 °F (36.9 °C)   Resp 14   Ht 5' 3\" (1.6 m)   Wt 63.5 kg (140 lb)   SpO2 90%   Breastfeeding? No   BMI 24.80 kg/m²     General appearance: alert, cooperative, no distress, appears stated age   Lungs: clear to auscultation bilaterally  Heart: regular rate and rhythm, S1, S2 normal, no murmur, click, rub or gallop  Abdomen: soft, non-tender. Bowel sounds normal. No masses,  no organomegaly  Extremities: RUE gauze packed I&D on forearm/antecubital fossa. Dressing c/d/i. Erythema resolved. Firmness distal to that and edema of the hand, improved from yesterday  Neurologic: Grossly normal    Disposition: home  Discharge Condition: stable  Patient Instructions: daily dressing changes, avoid IVDU, blood thinners as directed  Current Discharge Medication List      START taking these medications    Details   clindamycin (CLEOCIN) 300 mg capsule Take 1 Cap by mouth four (4) times daily. Qty: 16 Cap, Refills: 0      rivaroxaban (XARELTO STARTER ZEE) 15 mg (42)- 20 mg (9) DsPk Take one 15 mg tablet twice a day with food for the first 21 days. Then, take one 20 mg tablet once a day with food for 9 days.   Qty: 1 Dose Pack, Refills: 0      rivaroxaban (XARELTO) 20 mg tab tablet Take 1 Tab by mouth daily (with breakfast). Qty: 60 Tab, Refills: 0             Activity: Activity as tolerated  Diet: Regular Diet    Follow-up:     Follow-up Appointments   Procedures    FOLLOW UP VISIT Appointment in: Other (Specify) PCP 1 week     PCP 1 week     Standing Status:   Standing     Number of Occurrences:   1     Order Specific Question:   Appointment in     Answer:    Other (Specify)           Time spent to discharge patient 35 minutes  Signed:  Melody Zhong MD  11/16/2018  10:34 AM

## 2018-11-16 NOTE — PROGRESS NOTES
Shift assessment complete. Pt resting in recliner. Pt c/o swollen right hand, hand is not hot only warm. Pt is not in severe pain, and has a radial pulse.

## 2018-11-16 NOTE — PROGRESS NOTES
Sw offered assistance with obtaining appt for Methadone clinic. Patient was given additional  Info on two clinics that had specific info on new patient appts. Patient asking for a script for Methadone before she leaves to cover her until she can get established with a clinic. I showed her where one clinic was accepting new patients on Monday but [facility] was open until 1:30 today and encouraged her to call now before they closed as they are open on Saturdays. Patient grabbed her belongings and said she was just going to call her drug dealer. Patient walked out and declined offer to wheel her out. She did take the written info I had highlighted for her. Robb Rob

## 2018-11-16 NOTE — PROGRESS NOTES
Cm visited patient and is completing a [address] application and provided patient with several methadone clinic options methadone clinics [place] [address] and [address]. Also call [facility] to set up pt first appointment 11/21 @8:20 on [address]

## 2019-01-31 PROCEDURE — 99284 EMERGENCY DEPT VISIT MOD MDM: CPT | Performed by: EMERGENCY MEDICINE

## 2019-02-01 ENCOUNTER — APPOINTMENT (OUTPATIENT)
Dept: GENERAL RADIOLOGY | Age: 34
End: 2019-02-01
Attending: EMERGENCY MEDICINE
Payer: SUBSIDIZED

## 2019-02-01 ENCOUNTER — APPOINTMENT (OUTPATIENT)
Dept: CT IMAGING | Age: 34
End: 2019-02-01
Attending: EMERGENCY MEDICINE
Payer: SUBSIDIZED

## 2019-02-01 ENCOUNTER — HOSPITAL ENCOUNTER (OUTPATIENT)
Age: 34
Setting detail: OBSERVATION
Discharge: HOME | End: 2019-02-03
Attending: EMERGENCY MEDICINE | Admitting: HOSPITALIST
Payer: SUBSIDIZED

## 2019-02-01 DIAGNOSIS — I26.99 OTHER ACUTE PULMONARY EMBOLISM WITHOUT ACUTE COR PULMONALE (HCC): ICD-10-CM

## 2019-02-01 DIAGNOSIS — F19.10 IV DRUG ABUSE (HCC): ICD-10-CM

## 2019-02-01 DIAGNOSIS — J18.9 PNEUMONIA OF LEFT LOWER LOBE DUE TO INFECTIOUS ORGANISM: Primary | ICD-10-CM

## 2019-02-01 PROBLEM — F19.90 IVDU (INTRAVENOUS DRUG USER): Status: ACTIVE | Noted: 2019-02-01

## 2019-02-01 PROBLEM — J15.9 HOSPITAL-ACQUIRED BACTERIAL PNEUMONIA: Status: ACTIVE | Noted: 2019-02-01

## 2019-02-01 PROBLEM — R06.02 SHORTNESS OF BREATH: Status: ACTIVE | Noted: 2019-02-01

## 2019-02-01 LAB
ALBUMIN SERPL-MCNC: 3.5 G/DL (ref 3.5–5)
ALBUMIN/GLOB SERPL: 0.8 {RATIO}
ALP SERPL-CCNC: 74 U/L (ref 50–136)
ALT SERPL-CCNC: 19 U/L (ref 12–65)
AMPHET UR QL SCN: POSITIVE
ANION GAP SERPL CALC-SCNC: 5 MMOL/L
AST SERPL-CCNC: 13 U/L (ref 15–37)
ATRIAL RATE: 98 BPM
BARBITURATES UR QL SCN: NEGATIVE
BASOPHILS # BLD: 0 K/UL (ref 0–0.2)
BASOPHILS NFR BLD: 0 % (ref 0–2)
BENZODIAZ UR QL: NEGATIVE
BILIRUB DIRECT SERPL-MCNC: 0.1 MG/DL
BILIRUB SERPL-MCNC: 0.6 MG/DL (ref 0.2–1.1)
BUN SERPL-MCNC: 8 MG/DL (ref 6–23)
CALCIUM SERPL-MCNC: 8.7 MG/DL (ref 8.3–10.4)
CALCULATED P AXIS, ECG09: 56 DEGREES
CALCULATED R AXIS, ECG10: 64 DEGREES
CALCULATED T AXIS, ECG11: 51 DEGREES
CANNABINOIDS UR QL SCN: POSITIVE
CHLORIDE SERPL-SCNC: 101 MMOL/L (ref 98–107)
CO2 SERPL-SCNC: 30 MMOL/L (ref 21–32)
COCAINE UR QL SCN: NEGATIVE
CREAT SERPL-MCNC: 0.74 MG/DL (ref 0.6–1)
DIAGNOSIS, 93000: NORMAL
DIFFERENTIAL METHOD BLD: ABNORMAL
EOSINOPHIL # BLD: 0.2 K/UL (ref 0–0.8)
EOSINOPHIL NFR BLD: 2 % (ref 0.5–7.8)
ERYTHROCYTE [DISTWIDTH] IN BLOOD BY AUTOMATED COUNT: 14.8 % (ref 11.9–14.6)
GLOBULIN SER CALC-MCNC: 4.6 G/DL (ref 2.3–3.5)
GLUCOSE SERPL-MCNC: 105 MG/DL (ref 65–100)
HCT VFR BLD AUTO: 36.3 % (ref 35.8–46.3)
HGB BLD-MCNC: 11.9 G/DL (ref 11.7–15.4)
IMM GRANULOCYTES # BLD AUTO: 0 K/UL (ref 0–0.5)
IMM GRANULOCYTES NFR BLD AUTO: 0 % (ref 0–5)
LYMPHOCYTES # BLD: 1.9 K/UL (ref 0.5–4.6)
LYMPHOCYTES NFR BLD: 25 % (ref 13–44)
MCH RBC QN AUTO: 28.5 PG (ref 26.1–32.9)
MCHC RBC AUTO-ENTMCNC: 32.8 G/DL (ref 31.4–35)
MCV RBC AUTO: 86.8 FL (ref 79.6–97.8)
METHADONE UR QL: NEGATIVE
MONOCYTES # BLD: 0.7 K/UL (ref 0.1–1.3)
MONOCYTES NFR BLD: 9 % (ref 4–12)
NEUTS SEG # BLD: 4.9 K/UL (ref 1.7–8.2)
NEUTS SEG NFR BLD: 64 % (ref 43–78)
NRBC # BLD: 0 K/UL (ref 0–0.2)
OPIATES UR QL: POSITIVE
P-R INTERVAL, ECG05: 146 MS
PCP UR QL: NEGATIVE
PLATELET # BLD AUTO: 295 K/UL (ref 150–450)
PMV BLD AUTO: 9.1 FL (ref 9.4–12.3)
POTASSIUM SERPL-SCNC: 3.4 MMOL/L (ref 3.5–5.1)
PROT SERPL-MCNC: 8.1 G/DL
Q-T INTERVAL, ECG07: 338 MS
QRS DURATION, ECG06: 80 MS
QTC CALCULATION (BEZET), ECG08: 431 MS
RBC # BLD AUTO: 4.18 M/UL (ref 4.05–5.2)
SODIUM SERPL-SCNC: 136 MMOL/L (ref 136–145)
VENTRICULAR RATE, ECG03: 98 BPM
WBC # BLD AUTO: 7.8 K/UL (ref 4.3–11.1)

## 2019-02-01 PROCEDURE — 96375 TX/PRO/DX INJ NEW DRUG ADDON: CPT | Performed by: EMERGENCY MEDICINE

## 2019-02-01 PROCEDURE — 80074 ACUTE HEPATITIS PANEL: CPT

## 2019-02-01 PROCEDURE — 74011250637 HC RX REV CODE- 250/637: Performed by: HOSPITALIST

## 2019-02-01 PROCEDURE — 77030032490 HC SLV COMPR SCD KNE COVD -B

## 2019-02-01 PROCEDURE — 96366 THER/PROPH/DIAG IV INF ADDON: CPT

## 2019-02-01 PROCEDURE — 74011000258 HC RX REV CODE- 258: Performed by: HOSPITALIST

## 2019-02-01 PROCEDURE — 87040 BLOOD CULTURE FOR BACTERIA: CPT

## 2019-02-01 PROCEDURE — 74011000250 HC RX REV CODE- 250: Performed by: HOSPITALIST

## 2019-02-01 PROCEDURE — 99218 HC RM OBSERVATION: CPT

## 2019-02-01 PROCEDURE — 74011636320 HC RX REV CODE- 636/320: Performed by: EMERGENCY MEDICINE

## 2019-02-01 PROCEDURE — 96376 TX/PRO/DX INJ SAME DRUG ADON: CPT | Performed by: EMERGENCY MEDICINE

## 2019-02-01 PROCEDURE — 96372 THER/PROPH/DIAG INJ SC/IM: CPT | Performed by: EMERGENCY MEDICINE

## 2019-02-01 PROCEDURE — 77030013140 HC MSK NEB VYRM -A

## 2019-02-01 PROCEDURE — 77030027138 HC INCENT SPIROMETER -A

## 2019-02-01 PROCEDURE — 96367 TX/PROPH/DG ADDL SEQ IV INF: CPT | Performed by: EMERGENCY MEDICINE

## 2019-02-01 PROCEDURE — 94640 AIRWAY INHALATION TREATMENT: CPT

## 2019-02-01 PROCEDURE — 96376 TX/PRO/DX INJ SAME DRUG ADON: CPT

## 2019-02-01 PROCEDURE — 85025 COMPLETE CBC W/AUTO DIFF WBC: CPT

## 2019-02-01 PROCEDURE — 74011250636 HC RX REV CODE- 250/636: Performed by: EMERGENCY MEDICINE

## 2019-02-01 PROCEDURE — 36415 COLL VENOUS BLD VENIPUNCTURE: CPT

## 2019-02-01 PROCEDURE — 80048 BASIC METABOLIC PNL TOTAL CA: CPT

## 2019-02-01 PROCEDURE — 93005 ELECTROCARDIOGRAM TRACING: CPT | Performed by: EMERGENCY MEDICINE

## 2019-02-01 PROCEDURE — 80076 HEPATIC FUNCTION PANEL: CPT

## 2019-02-01 PROCEDURE — 96365 THER/PROPH/DIAG IV INF INIT: CPT | Performed by: EMERGENCY MEDICINE

## 2019-02-01 PROCEDURE — 87389 HIV-1 AG W/HIV-1&-2 AB AG IA: CPT

## 2019-02-01 PROCEDURE — 74011250636 HC RX REV CODE- 250/636: Performed by: HOSPITALIST

## 2019-02-01 PROCEDURE — 77030020254 HC SOL INJ D5LR LACTATED RINGER

## 2019-02-01 PROCEDURE — 96366 THER/PROPH/DIAG IV INF ADDON: CPT | Performed by: EMERGENCY MEDICINE

## 2019-02-01 PROCEDURE — 94760 N-INVAS EAR/PLS OXIMETRY 1: CPT

## 2019-02-01 PROCEDURE — 80307 DRUG TEST PRSMV CHEM ANLYZR: CPT

## 2019-02-01 PROCEDURE — 71260 CT THORAX DX C+: CPT

## 2019-02-01 PROCEDURE — 71046 X-RAY EXAM CHEST 2 VIEWS: CPT

## 2019-02-01 PROCEDURE — 74011000258 HC RX REV CODE- 258: Performed by: EMERGENCY MEDICINE

## 2019-02-01 PROCEDURE — 74011000302 HC RX REV CODE- 302: Performed by: HOSPITALIST

## 2019-02-01 PROCEDURE — 86580 TB INTRADERMAL TEST: CPT | Performed by: HOSPITALIST

## 2019-02-01 PROCEDURE — 96372 THER/PROPH/DIAG INJ SC/IM: CPT

## 2019-02-01 PROCEDURE — 96368 THER/DIAG CONCURRENT INF: CPT

## 2019-02-01 RX ORDER — NALOXONE HYDROCHLORIDE 0.4 MG/ML
0.4 INJECTION, SOLUTION INTRAMUSCULAR; INTRAVENOUS; SUBCUTANEOUS AS NEEDED
Status: DISCONTINUED | OUTPATIENT
Start: 2019-02-01 | End: 2019-02-03 | Stop reason: HOSPADM

## 2019-02-01 RX ORDER — SODIUM CHLORIDE 0.9 % (FLUSH) 0.9 %
5-40 SYRINGE (ML) INJECTION AS NEEDED
Status: DISCONTINUED | OUTPATIENT
Start: 2019-02-01 | End: 2019-02-03 | Stop reason: HOSPADM

## 2019-02-01 RX ORDER — SODIUM CHLORIDE 0.9 % (FLUSH) 0.9 %
5-40 SYRINGE (ML) INJECTION AS NEEDED
Status: DISCONTINUED | OUTPATIENT
Start: 2019-02-01 | End: 2019-02-02

## 2019-02-01 RX ORDER — SODIUM CHLORIDE 0.9 % (FLUSH) 0.9 %
10 SYRINGE (ML) INJECTION
Status: COMPLETED | OUTPATIENT
Start: 2019-02-01 | End: 2019-02-01

## 2019-02-01 RX ORDER — OXYCODONE AND ACETAMINOPHEN 7.5; 325 MG/1; MG/1
1 TABLET ORAL
Status: DISCONTINUED | OUTPATIENT
Start: 2019-02-01 | End: 2019-02-03 | Stop reason: HOSPADM

## 2019-02-01 RX ORDER — MORPHINE SULFATE 2 MG/ML
4 INJECTION, SOLUTION INTRAMUSCULAR; INTRAVENOUS
Status: DISCONTINUED | OUTPATIENT
Start: 2019-02-01 | End: 2019-02-01

## 2019-02-01 RX ORDER — MORPHINE SULFATE 4 MG/ML
4 INJECTION INTRAVENOUS
Status: COMPLETED | OUTPATIENT
Start: 2019-02-01 | End: 2019-02-01

## 2019-02-01 RX ORDER — GUAIFENESIN/DEXTROMETHORPHAN 100-10MG/5
5 SYRUP ORAL
Status: DISCONTINUED | OUTPATIENT
Start: 2019-02-01 | End: 2019-02-03 | Stop reason: HOSPADM

## 2019-02-01 RX ORDER — AMOXICILLIN 250 MG
1 CAPSULE ORAL DAILY
Status: DISCONTINUED | OUTPATIENT
Start: 2019-02-01 | End: 2019-02-02

## 2019-02-01 RX ORDER — MORPHINE SULFATE 10 MG/ML
4 INJECTION, SOLUTION INTRAMUSCULAR; INTRAVENOUS
Status: COMPLETED | OUTPATIENT
Start: 2019-02-01 | End: 2019-02-01

## 2019-02-01 RX ORDER — MORPHINE SULFATE 4 MG/ML
2 INJECTION INTRAVENOUS
Status: DISCONTINUED | OUTPATIENT
Start: 2019-02-01 | End: 2019-02-03 | Stop reason: HOSPADM

## 2019-02-01 RX ORDER — MORPHINE SULFATE 2 MG/ML
2 INJECTION, SOLUTION INTRAMUSCULAR; INTRAVENOUS
Status: DISCONTINUED | OUTPATIENT
Start: 2019-02-01 | End: 2019-02-01 | Stop reason: SDUPTHER

## 2019-02-01 RX ORDER — DEXTROSE, SODIUM CHLORIDE, SODIUM LACTATE, POTASSIUM CHLORIDE, AND CALCIUM CHLORIDE 5; .6; .31; .03; .02 G/100ML; G/100ML; G/100ML; G/100ML; G/100ML
125 INJECTION, SOLUTION INTRAVENOUS CONTINUOUS
Status: DISCONTINUED | OUTPATIENT
Start: 2019-02-01 | End: 2019-02-02

## 2019-02-01 RX ORDER — ACETAMINOPHEN 325 MG/1
650 TABLET ORAL
Status: DISCONTINUED | OUTPATIENT
Start: 2019-02-01 | End: 2019-02-03 | Stop reason: HOSPADM

## 2019-02-01 RX ORDER — ONDANSETRON 2 MG/ML
4 INJECTION INTRAMUSCULAR; INTRAVENOUS
Status: COMPLETED | OUTPATIENT
Start: 2019-02-01 | End: 2019-02-01

## 2019-02-01 RX ORDER — LEVALBUTEROL INHALATION SOLUTION 0.63 MG/3ML
0.63 SOLUTION RESPIRATORY (INHALATION)
Status: DISCONTINUED | OUTPATIENT
Start: 2019-02-01 | End: 2019-02-02

## 2019-02-01 RX ORDER — ONDANSETRON 2 MG/ML
4 INJECTION INTRAMUSCULAR; INTRAVENOUS
Status: DISCONTINUED | OUTPATIENT
Start: 2019-02-01 | End: 2019-02-03 | Stop reason: HOSPADM

## 2019-02-01 RX ORDER — SODIUM CHLORIDE 0.9 % (FLUSH) 0.9 %
5-40 SYRINGE (ML) INJECTION EVERY 8 HOURS
Status: DISCONTINUED | OUTPATIENT
Start: 2019-02-01 | End: 2019-02-03 | Stop reason: HOSPADM

## 2019-02-01 RX ORDER — SODIUM CHLORIDE 0.9 % (FLUSH) 0.9 %
5-40 SYRINGE (ML) INJECTION EVERY 8 HOURS
Status: DISCONTINUED | OUTPATIENT
Start: 2019-02-01 | End: 2019-02-02

## 2019-02-01 RX ORDER — GUAIFENESIN 600 MG/1
1200 TABLET, EXTENDED RELEASE ORAL EVERY 12 HOURS
Status: DISCONTINUED | OUTPATIENT
Start: 2019-02-01 | End: 2019-02-03 | Stop reason: HOSPADM

## 2019-02-01 RX ORDER — ENOXAPARIN SODIUM 100 MG/ML
1 INJECTION SUBCUTANEOUS EVERY 12 HOURS
Status: DISCONTINUED | OUTPATIENT
Start: 2019-02-01 | End: 2019-02-02

## 2019-02-01 RX ADMIN — MORPHINE SULFATE 4 MG: 10 INJECTION INTRAVENOUS at 01:46

## 2019-02-01 RX ADMIN — OXYCODONE HYDROCHLORIDE AND ACETAMINOPHEN 1 TABLET: 7.5; 325 TABLET ORAL at 22:32

## 2019-02-01 RX ADMIN — Medication 5 ML: at 03:25

## 2019-02-01 RX ADMIN — CEFTRIAXONE 1 G: 1 INJECTION, POWDER, FOR SOLUTION INTRAMUSCULAR; INTRAVENOUS at 03:18

## 2019-02-01 RX ADMIN — ENOXAPARIN SODIUM 70 MG: 40 INJECTION SUBCUTANEOUS at 04:08

## 2019-02-01 RX ADMIN — ACETAMINOPHEN 650 MG: 325 TABLET, FILM COATED ORAL at 10:46

## 2019-02-01 RX ADMIN — GUAIFENESIN 1200 MG: 600 TABLET, EXTENDED RELEASE ORAL at 22:27

## 2019-02-01 RX ADMIN — MORPHINE SULFATE 4 MG: 4 INJECTION INTRAVENOUS at 03:23

## 2019-02-01 RX ADMIN — SODIUM CHLORIDE 100 ML: 900 INJECTION, SOLUTION INTRAVENOUS at 02:06

## 2019-02-01 RX ADMIN — MORPHINE SULFATE 2 MG: 4 INJECTION INTRAVENOUS at 12:50

## 2019-02-01 RX ADMIN — LEVALBUTEROL HYDROCHLORIDE 0.63 MG: 0.63 SOLUTION RESPIRATORY (INHALATION) at 20:20

## 2019-02-01 RX ADMIN — MORPHINE SULFATE 2 MG: 4 INJECTION INTRAVENOUS at 19:58

## 2019-02-01 RX ADMIN — LEVALBUTEROL HYDROCHLORIDE 0.63 MG: 0.63 SOLUTION RESPIRATORY (INHALATION) at 04:12

## 2019-02-01 RX ADMIN — GUAIFENESIN 1200 MG: 600 TABLET, EXTENDED RELEASE ORAL at 10:47

## 2019-02-01 RX ADMIN — SODIUM CHLORIDE, SODIUM LACTATE, POTASSIUM CHLORIDE, CALCIUM CHLORIDE, AND DEXTROSE MONOHYDRATE 125 ML/HR: 600; 310; 30; 20; 5 INJECTION, SOLUTION INTRAVENOUS at 04:00

## 2019-02-01 RX ADMIN — Medication 10 ML: at 12:50

## 2019-02-01 RX ADMIN — SODIUM CHLORIDE 1000 ML: 900 INJECTION, SOLUTION INTRAVENOUS at 00:29

## 2019-02-01 RX ADMIN — TUBERCULIN PURIFIED PROTEIN DERIVATIVE 5 UNITS: 5 INJECTION, SOLUTION INTRADERMAL at 04:06

## 2019-02-01 RX ADMIN — Medication 1 AMPULE: at 12:24

## 2019-02-01 RX ADMIN — LEVALBUTEROL HYDROCHLORIDE 0.63 MG: 0.63 SOLUTION RESPIRATORY (INHALATION) at 08:43

## 2019-02-01 RX ADMIN — SODIUM CHLORIDE, SODIUM LACTATE, POTASSIUM CHLORIDE, CALCIUM CHLORIDE, AND DEXTROSE MONOHYDRATE 125 ML/HR: 600; 310; 30; 20; 5 INJECTION, SOLUTION INTRAVENOUS at 15:54

## 2019-02-01 RX ADMIN — PIPERACILLIN SODIUM,TAZOBACTAM SODIUM 4.5 G: 4; .5 INJECTION, POWDER, FOR SOLUTION INTRAVENOUS at 20:07

## 2019-02-01 RX ADMIN — SENNOSIDES AND DOCUSATE SODIUM 1 TABLET: 8.6; 5 TABLET ORAL at 10:48

## 2019-02-01 RX ADMIN — Medication 10 ML: at 02:06

## 2019-02-01 RX ADMIN — IOPAMIDOL 100 ML: 755 INJECTION, SOLUTION INTRAVENOUS at 02:05

## 2019-02-01 RX ADMIN — Medication 1 AMPULE: at 22:27

## 2019-02-01 RX ADMIN — OXYCODONE HYDROCHLORIDE AND ACETAMINOPHEN 1 TABLET: 7.5; 325 TABLET ORAL at 10:48

## 2019-02-01 RX ADMIN — PIPERACILLIN SODIUM,TAZOBACTAM SODIUM 4.5 G: 4; .5 INJECTION, POWDER, FOR SOLUTION INTRAVENOUS at 05:30

## 2019-02-01 RX ADMIN — PIPERACILLIN SODIUM,TAZOBACTAM SODIUM 4.5 G: 4; .5 INJECTION, POWDER, FOR SOLUTION INTRAVENOUS at 12:23

## 2019-02-01 RX ADMIN — VANCOMYCIN HYDROCHLORIDE 1000 MG: 1 INJECTION, POWDER, LYOPHILIZED, FOR SOLUTION INTRAVENOUS at 03:20

## 2019-02-01 RX ADMIN — VANCOMYCIN HYDROCHLORIDE 1000 MG: 1 INJECTION, POWDER, LYOPHILIZED, FOR SOLUTION INTRAVENOUS at 20:00

## 2019-02-01 RX ADMIN — VANCOMYCIN HYDROCHLORIDE 1000 MG: 1 INJECTION, POWDER, LYOPHILIZED, FOR SOLUTION INTRAVENOUS at 12:23

## 2019-02-01 RX ADMIN — ENOXAPARIN SODIUM 70 MG: 40 INJECTION SUBCUTANEOUS at 15:53

## 2019-02-01 RX ADMIN — ONDANSETRON 4 MG: 2 INJECTION INTRAMUSCULAR; INTRAVENOUS at 01:46

## 2019-02-01 NOTE — PROGRESS NOTES
02/01/19 1202 Dual Skin Pressure Injury Assessment Dual Skin Pressure Injury Assessment [facility] Second Care Provider (Based on [address] [place] [address]) Parminder Wisdom RN

## 2019-02-01 NOTE — PROGRESS NOTES
Vancomycin Consult MD ordering: Chani BROWNE following? no Indication: HAP 
DOT:  7 days Goal level(s): 15 - 20 Ht Readings from Last 1 Encounters:  
19 5' 3\" (1.6 m) Wt Readings from Last 1 Encounters:  
19 72.6 kg (160 lb) Temp (24hrs), Av.4 °F (38 °C), Min:98.6 °F (37 °C), Max:102.4 °F (39.1 °C) Dosing weight: 73 kg 
26 y.o. Date:  Dose/Freq Admin Times Level/Time:  
 1 gm IV x 1 dose 0320   
2 1 gm IV q8h 1223, ()  1 gm IV q8h (0400, 1200) Trough level due at 1100 Recent Labs 19 
0117 BUN 8  
CREA 0.74 WBC 7.8 Estimated Creatinine Clearance: 101.3 mL/min (based on SCr of 0.74 mg/dL). Day 1 Assessment and Plan: 
Vancomycin dose initiated at 1 gm IV x 1 dose. Vancomycin dose continued at 1 gm IV every 8 hours. Trough level scheduled for tomorrow () at 1100, prior to 1200 dose. Will continue to follow. Thank you, Mo Barnes, PharmD

## 2019-02-01 NOTE — PROGRESS NOTES
Hospitalist Progress Note Admit Date:  2019 12:15 AM  
Name:  Lea Crooks Age:  26 y.o. 
:  1985 MRN:  [id number] PCP:  None Treatment Team: Attending Provider: Mart Sewell MD; Utilization Review: Ceasar Fleming RN Subjective:  
 
Ms. Brandie Calvin is a 27 yo female with PMH of polysubstance use, RUE DVT admitted with LLL pneumonia. CTA chest shows small RUL PE. BC pending. UDS positive for amphetamines/ THC/opiates. She has been started on vancomycin/zosyn, BC pending. She is also on treatment dose lovenox 19 in pain with deep breaths Objective:  
 
Patient Vitals for the past 24 hrs: 
 Temp Pulse Resp BP SpO2  
19 1604 99.3 °F (37.4 °C) 100 22 95/63 96 % 19 1201 100.2 °F (37.9 °C) (!) 115 26 100/68 95 % 19 1043 (!) 102.4 °F (39.1 °C) (!) 122 24 98/64 96 % 19 0843     98 % 19 0800  (!) 118     
19 0730  (!) 117   97 % 19 0700  (!) 116   98 % 19 0624  (!) 112   97 % 19 0623  (!) 119  107/69   
19 0458  (!) 109  101/68 99 % 19 0429  99     
19 0412     95 % 19 0402  (!) 103   98 % 19 0330  (!) 102   99 % 19 0236  (!) 109  101/71 94 % 19 2357 98.6 °F (37 °C) (!) 110 20 110/69 98 % Oxygen Therapy O2 Sat (%): 96 % (19 1604) Pulse via Oximetry: 119 beats per minute (19 0843) O2 Device: Room air (19 1202) Intake/Output Summary (Last 24 hours) at 2019 1842 Last data filed at 2019 1712 Gross per 24 hour Intake  Output 300 ml Net -300 ml *Note that automatically entered I/Os may not be accurate; dependent on patient compliance with collection and accurate  by assistants. General:    Well nourished. Alert. Appears unwell CV:   RRR. No murmur, rub, or gallop. No edema Lungs:   Crackles left lung field Abdomen:   Soft, nontender, nondistended. Extremities: Warm and dry. Skin:     No rashes or jaundice. Neuro:  No gross focal deficits Data Review: 
I have reviewed all labs, meds, telemetry events, and studies from the last 24 hours: 
 
Recent Results (from the past 24 hour(s)) METABOLIC PANEL, BASIC Collection Time: 02/01/19  1:17 AM  
Result Value Ref Range Sodium 136 136 - 145 mmol/L Potassium 3.4 (L) 3.5 - 5.1 mmol/L Chloride 101 98 - 107 mmol/L  
 CO2 30 21 - 32 mmol/L Anion gap 5 mmol/L Glucose 105 (H) 65 - 100 mg/dL BUN 8 6 - 23 MG/DL Creatinine 0.74 0.6 - 1.0 MG/DL  
 GFR est AA >60 >60 ml/min/1.73m2 GFR est non-AA >60 ml/min/1.73m2 Calcium 8.7 8.3 - 10.4 MG/DL  
CBC WITH AUTOMATED DIFF Collection Time: 02/01/19  1:17 AM  
Result Value Ref Range WBC 7.8 4.3 - 11.1 K/uL  
 RBC 4.18 4.05 - 5.2 M/uL  
 HGB 11.9 11.7 - 15.4 g/dL HCT 36.3 35.8 - 46.3 % MCV 86.8 79.6 - 97.8 FL  
 MCH 28.5 26.1 - 32.9 PG  
 MCHC 32.8 31.4 - 35.0 g/dL  
 RDW 14.8 (H) 11.9 - 14.6 % PLATELET 165 941 - 052 K/uL MPV 9.1 (L) 9.4 - 12.3 FL ABSOLUTE NRBC 0.00 0.0 - 0.2 K/uL  
 DF AUTOMATED NEUTROPHILS 64 43 - 78 % LYMPHOCYTES 25 13 - 44 % MONOCYTES 9 4.0 - 12.0 % EOSINOPHILS 2 0.5 - 7.8 % BASOPHILS 0 0.0 - 2.0 % IMMATURE GRANULOCYTES 0 0.0 - 5.0 %  
 ABS. NEUTROPHILS 4.9 1.7 - 8.2 K/UL  
 ABS. LYMPHOCYTES 1.9 0.5 - 4.6 K/UL  
 ABS. MONOCYTES 0.7 0.1 - 1.3 K/UL  
 ABS. EOSINOPHILS 0.2 0.0 - 0.8 K/UL  
 ABS. BASOPHILS 0.0 0.0 - 0.2 K/UL  
 ABS. IMM. GRANS. 0.0 0.0 - 0.5 K/UL HEPATIC FUNCTION PANEL Collection Time: 02/01/19  1:17 AM  
Result Value Ref Range Protein, total 8.1 g/dL Albumin 3.5 3.5 - 5.0 g/dL Globulin 4.6 (H) 2.3 - 3.5 g/dL A-G Ratio 0.8 Bilirubin, total 0.6 0.2 - 1.1 MG/DL Bilirubin, direct 0.1 <0.4 MG/DL Alk. phosphatase 74 50 - 136 U/L  
 AST (SGOT) 13 (L) 15 - 37 U/L  
 ALT (SGPT) 19 12 - 65 U/L  
EKG, 12 LEAD, INITIAL  Collection Time: 02/01/19  1:51 AM  
 Result Value Ref Range Ventricular Rate 98 BPM  
 Atrial Rate 98 BPM  
 P-R Interval 146 ms  
 QRS Duration 80 ms  
 Q-T Interval 338 ms QTC Calculation (Bezet) 431 ms Calculated P Axis 56 degrees Calculated R Axis 64 degrees Calculated T Axis 51 degrees Diagnosis    
  !! AGE AND GENDER SPECIFIC ECG ANALYSIS !! Normal sinus rhythm Normal ECG When compared with ECG of 07-MAR-2018 18:48, No significant change was found Confirmed by JUAN CARLOS MASON (), Ranjith Jimenez ([id number]) on 2/1/2019 8:36:42 AM 
  
DRUG SCREEN, URINE Collection Time: 02/01/19  5:15 PM  
Result Value Ref Range PCP(PHENCYCLIDINE) NEGATIVE BENZODIAZEPINES NEGATIVE     
 COCAINE NEGATIVE AMPHETAMINES POSITIVE METHADONE NEGATIVE     
 THC (TH-CANNABINOL) POSITIVE    
 OPIATES POSITIVE    
 BARBITURATES NEGATIVE All Micro Results Procedure Component Value Units Date/Time CULTURE, BLOOD [[id number]] Collected:  02/01/19 0301 Order Status:  Completed Specimen:  Blood Updated:  02/01/19 8282 CULTURE, BLOOD [[id number]] Collected:  02/01/19 0310 Order Status:  Completed Specimen:  Blood Updated:  02/01/19 3324 No results found for this visit on 02/01/19. Current Meds: 
Current Facility-Administered Medications Medication Dose Route Frequency  sodium chloride (NS) flush 5-40 mL  5-40 mL IntraVENous Q8H  
 sodium chloride (NS) flush 5-40 mL  5-40 mL IntraVENous PRN  
 sodium chloride (NS) flush 5-40 mL  5-40 mL IntraVENous Q8H  
 sodium chloride (NS) flush 5-40 mL  5-40 mL IntraVENous PRN  
 tuberculin injection 5 Units  5 Units IntraDERMal ONCE  
 acetaminophen (TYLENOL) tablet 650 mg  650 mg Oral Q6H PRN  
 oxyCODONE-acetaminophen (PERCOCET 7.5) 7.5-325 mg per tablet 1 Tab  1 Tab Oral Q8H PRN  
 naloxone (NARCAN) injection 0.4 mg  0.4 mg IntraVENous PRN  
 ondansetron (ZOFRAN) injection 4 mg  4 mg IntraVENous Q4H PRN  
  senna-docusate (PERICOLACE) 8.6-50 mg per tablet 1 Tab  1 Tab Oral DAILY  enoxaparin (LOVENOX) injection 70 mg  1 mg/kg SubCUTAneous Q12H  
 vancomycin (VANCOCIN) 1,000 mg in 0.9% sodium chloride (MBP/ADV) 250 mL  1,000 mg IntraVENous Q8H  
 levalbuterol (XOPENEX) nebulizer soln 0.63 mg/3 mL  0.63 mg Nebulization Q6H RT  
 guaiFENesin ER (MUCINEX) tablet 1,200 mg  1,200 mg Oral Q12H  
 guaiFENesin-dextromethorphan (ROBITUSSIN DM) 100-10 mg/5 mL syrup 5 mL  5 mL Oral Q6H PRN  piperacillin-tazobactam (ZOSYN) 4.5 g in 0.9% sodium chloride (MBP/ADV) 100 mL  4.5 g IntraVENous Q8H  
 dextrose 5% lactated ringers infusion  125 mL/hr IntraVENous CONTINUOUS  
 alcohol 62% (NOZIN) nasal  1 Ampule  1 Ampule Topical Q12H  
 morphine injection 2 mg  2 mg IntraVENous Q4H PRN  
 [START ON 2/2/2019] VANCOMYCIN INFORMATION NOTE   Other Rx Dosing/Monitoring Other Studies (last 24 hours): Xr Chest Pa Lat Result Date: 2/1/2019 EXAM: XR CHEST PA LAT INDICATION: shortness of breath COMPARISON: 3/7/2018. FINDINGS: PA and lateral radiographs of the chest demonstrate airspace disease left lower lobe and lingula. The cardiac and mediastinal contours and pulmonary vascularity are normal. The bones and soft tissues are within normal limits. IMPRESSION: Left lower lobe and lingular pneumonia. Ct Chest W Cont Addendum Date: 2/1/2019 Addendum: ADDENDUM CREATED: 2/1/2019 2:50 AM PROCEDURE: CT CHEST W CONT 2/1/2019 2:05 AM  COMMENTS: There is a tiny subsegmental pulmonary embolism in the right lower lobe on image 63 and 64. IMPRESSION: See above. END OF ADDENDUM Date Of Dictation: 2/1/2019 2:50 AM  
 
Result Date: 2/1/2019 CTA OF THE CHEST - PE STUDY HISTORY: Left-sided chest pain COMPARISON: None TECHNIQUE: A helical acquisition was performed through the chest utilizing 4.64SU slice thickness during the infusion of 100 cc of Isovue-370. 3-D post-processed images were created on an independent workstation. Multiplanar reformats were obtained. The exam was focused on the pulmonary arteries. Dose reduction techniques used: Automated exposure control, adjustment of the mAs and/or kVp according to patient's size, and iterative reconstruction techniques. FINDINGS: *  PULMONARY VESSELS: No evidence of pulmonary embolism. *  PLEURA / PERICARDIUM: Moderate sized left pleural effusion. *  ICNDY / MEDIASTINUM: Within normal limits. *  LUNGS: Left lower lobe and lingular airspace disease. *  TRACHEOBRONCHIAL TREE: Within normal limits. *  AORTA: Within normal limits. *  CORONARY ARTERIES: No coronary artery calcification is seen. *  CHEST WALL/AXILLA: Within normal limits. *  VISUALIZED UPPER ABDOMEN: Within normal limits. *  SPINE / BONES: Within normal limits. *  ADDITIONAL COMMENTS: None. IMPRESSION: No evidence of pulmonary embolism. Left lower lobe and lingular pneumonia. Left pleural effusion. Date of Dictation: 2/1/2019 2:26 AM 
 
 
Assessment and Plan:  
 
Hospital Problems as of 2/1/2019 Never Reviewed Codes Class Noted - Resolved POA Shortness of breath ICD-10-CM: R06.02 
ICD-9-CM: 786.05  2/1/2019 - Present Unknown IVDU (intravenous drug user) ICD-10-CM: F19.90 ICD-9-CM: 305.90  2/1/2019 - Present Unknown Acute pulmonary embolism ([facility] [address] ICD-10-CM: I26.99 
ICD-9-CM: 415.19  2/1/2019 - Present Unknown Hospital-acquired bacterial pneumonia ICD-10-CM: J15.9 ICD-9-CM: 482.9  2/1/2019 - Present Unknown Plan: 
· Sepsis with LLL pneumonia: continue vancomycin and zosyn, followup BC, check HCG 
· Polysubstance use: avoid IV narcotics as possible · Hypokalemia: replace and repeat · PE: on treatment dose lovenox, will need likely chronic anticoagulation at discharge as has prior DVT, needs hypercoagulable workup DC planning/Dispo:  pending Diet:  DIET REGULAR 
DVT ppx:  lovenox Signed: Tyree Ferreira MD

## 2019-02-01 NOTE — PROGRESS NOTES
TRANSFER - IN REPORT: 
 
Verbal report received from Ranjith RN(name) on Jody Disla  being received from ER(unit) for routine progression of care Report consisted of patients Situation, Background, Assessment and  
Recommendations(SBAR). Information from the following report(s) SBAR and Kardex was reviewed with the receiving nurse. Opportunity for questions and clarification was provided. Assessment completed upon patients arrival to unit and care assumed.

## 2019-02-01 NOTE — PROGRESS NOTES
Called MEWS score of 5 to house supervisor Netta Stokes RN. Also called Dr. Zenovia Phoenix to notify, waiting call back.

## 2019-02-01 NOTE — ED PROVIDER NOTES
58-year-old female with history of IV drug abuse but none in the past 2 months presents with left-sided chest pain radiating into her back over the past 2 days. Pain is aggravated by breathing. She does report shortness of breath. No fever or cough. Note, she was in the hospital approximately 2-1/2 months ago with abscess, cellulitis and DVT in her right arm. She states she never filled the xarelto she was prescribed upon discharge. The history is provided by the patient. Shortness of Breath Associated symptoms include chest pain. Pertinent negatives include no fever, no headaches, no neck pain, no cough, no vomiting, no abdominal pain, no rash and no leg swelling. History reviewed. No pertinent past medical history. Past Surgical History:  
Procedure Laterality Date  HX  SECTION    
 HX GYN    
 HX ORTHOPAEDIC  2018  
 left arm  HX TUBAL LIGATION Bilateral   
 
   
History reviewed. No pertinent family history. Social History Socioeconomic History  Marital status: SINGLE Spouse name: Not on file  Number of children: Not on file  Years of education: Not on file  Highest education level: Not on file Social Needs  Financial resource strain: Not on file  Food insecurity - worry: Not on file  Food insecurity - inability: Not on file  Transportation needs - medical: Not on file  Transportation needs - non-medical: Not on file Occupational History  Not on file Tobacco Use  Smoking status: Never Smoker  Smokeless tobacco: Never Used Substance and Sexual Activity  Alcohol use: No  
  Frequency: Never  Drug use: Yes Types: Marijuana, Methamphetamines, Heroin, Opiates Comment: daily use  Sexual activity: Not on file Other Topics Concern  Not on file Social History Narrative  Not on file ALLERGIES: Patient has no known allergies. Review of Systems Constitutional: Negative for fever. HENT: Negative for congestion. Respiratory: Positive for shortness of breath. Negative for cough. Cardiovascular: Positive for chest pain. Negative for leg swelling. Gastrointestinal: Negative for abdominal pain, nausea and vomiting. Genitourinary: Negative for dysuria. Musculoskeletal: Negative for back pain and neck pain. Skin: Negative for rash. Neurological: Negative for headaches. Vitals:  
 01/31/19 2357 BP: 110/69 Pulse: (!) 110 Resp: 20 Temp: 98.6 °F (37 °C) SpO2: 98% Weight: 72.6 kg (160 lb) Height: 5' 3\" (1.6 m) Physical Exam  
Constitutional: She is oriented to person, place, and time. She appears well-developed and well-nourished. She does not appear ill. No distress. HENT:  
Head: Normocephalic and atraumatic. Mouth/Throat: Oropharynx is clear and moist.  
Eyes: Conjunctivae are normal. Pupils are equal, round, and reactive to light. Neck: Normal range of motion. Neck supple. Cardiovascular: Regular rhythm. Tachycardia present. No murmur heard. Pulmonary/Chest: Effort normal.  
Diminished breath sounds left base Abdominal: Soft. She exhibits no distension. There is no tenderness. Musculoskeletal: Normal range of motion. She exhibits no edema or tenderness. Neurological: She is alert and oriented to person, place, and time. Skin: Skin is warm and dry. Psychiatric: She has a normal mood and affect. Her behavior is normal.  
Nursing note and vitals reviewed. MDM Number of Diagnoses or Management Options IV drug abuse [facility]):  
Pneumonia of left lower lobe due to infectious organism [facility]):  
Diagnosis management comments: Blood work unremarkable. Chest x-ray shows left lower lobe and lingular infiltrate.  Because of her known history of DVT and IV drug use she underwent CT to rule out PE.  CT shows left lower lobe and lingular infiltrate and pleural effusion there is also a small right lower pulmonary embolism. Patient still in significant discomfort in spite of morphine. Have ordered IV vancomycin and Rocephin. Hospitalist consulted for admission. Amount and/or Complexity of Data Reviewed Clinical lab tests: ordered and reviewed Tests in the radiology section of CPT®: ordered and reviewed Tests in the medicine section of CPT®: ordered and reviewed Discuss the patient with other providers: yes Independent visualization of images, tracings, or specimens: yes Risk of Complications, Morbidity, and/or Mortality Presenting problems: moderate Diagnostic procedures: moderate Management options: moderate Procedures

## 2019-02-01 NOTE — ED NOTES
TRANSFER - OUT REPORT: 
 
Verbal report given to RN Rickie Milian on Juliana Evangeline  being transferred to  for routine progression of care Report consisted of patients Situation, Background, Assessment and  
Recommendations(SBAR). Information from the following report(s) SBAR, ED Summary, Intake/Output, MAR and Cardiac Rhythm Sinus Tachycardia was reviewed with the receiving nurse. Lines:    
 
Opportunity for questions and clarification was provided. Patient transported with: 
 IV fluids

## 2019-02-01 NOTE — H&P
HOSPITALIST H&P/CONSULTNAME:  Jeff Allen Age:  26 y.o. 
:   1985 MRN:   [id number] PCP: None Consulting MD: Treatment Team: Attending Provider: Philip Coello MD 
HPI:  
Patient is a 26years old female with pmhx of non compliance, IVDU, RUE DVT presented to ER with 2-3 days hx of shortness of breath and generalized body pain. Pt was in usual health until few days ago. She reported sudden onset of cough, occasionally productive, a/w shortness of breath with exertion which is progressively getting worse. She also reports of chest pain on deep breathing, more on the left. She denies fever, chills, rigors, nausea/vomiting, diarrhea, urinary symptoms, abdominal pain. She was discharged on Xarelto in 2018, but never got her medications refilled. She states that she would have to go to [facility] to get her meds as she is enrolled with [facility]. In ER, CT chest showed LLL dense infiltrate with pleural effusion and a subsegmental right PE. Pt is tachycardic with HR >100, K 3.4. Pt stated she last used heroin about 4 months ago but to ER physician she reported 2 months ago. Complete ROS done and is as stated in HPI or otherwise negative PMHX: 
Right arm cellulitis Right UE DVT Past Surgical History:  
Procedure Laterality Date  HX  SECTION    
 HX GYN    
 HX ORTHOPAEDIC  2018  
 left arm  HX TUBAL LIGATION Bilateral   
  
None No Known Allergies Social History Tobacco Use  Smoking status: Never Smoker  Smokeless tobacco: Never Used Substance Use Topics  Alcohol use: No  
  Frequency: Never Family Hx: 
Non contributory No DM-2, HTN, CVA, MI, cancer Objective:  
 
Visit Vitals /71 Pulse (!) 109 Temp 98.6 °F (37 °C) Resp 20 Ht 5' 3\" (1.6 m) Wt 72.6 kg (160 lb) SpO2 94% BMI 28.34 kg/m² Temp (24hrs), Av.6 °F (37 °C), Min:98.6 °F (37 °C), Max:98.6 °F (37 °C) Oxygen Therapy O2 Sat (%): 94 % (02/01/19 0236) Pulse via Oximetry: 108 beats per minute (02/01/19 0236) O2 Device: Room air (01/31/19 5174) Physical Exam: 
General:    Alert, awake, sick appearing, multiple piercings on the face Head:   Normocephalic, without obvious abnormality, atraumatic. Nose:  Nares normal. No drainage or sinus tenderness. Lungs:   Coarse breath sounds with ronchi on the left, with clear on the right, no wheezing Heart:   Regular rhythm, tachycardic  no murmur, rub or gallop. Abdomen:   Soft, non-tender. Not distended. Bowel sounds normal.  
Extremities: No cyanosis. No edema. No clubbing Skin:     Texture, turgor normal. No rashes or lesions. Not Jaundiced Neurologic: GCS 15, no motor or sensory deficits, CN 2-12 intact Psych:             AO x3 , mood and affect appropriate Data Review:  
Recent Results (from the past 24 hour(s)) METABOLIC PANEL, BASIC Collection Time: 02/01/19  1:17 AM  
Result Value Ref Range Sodium 136 136 - 145 mmol/L Potassium 3.4 (L) 3.5 - 5.1 mmol/L Chloride 101 98 - 107 mmol/L  
 CO2 30 21 - 32 mmol/L Anion gap 5 mmol/L Glucose 105 (H) 65 - 100 mg/dL BUN 8 6 - 23 MG/DL Creatinine 0.74 0.6 - 1.0 MG/DL  
 GFR est AA >60 >60 ml/min/1.73m2 GFR est non-AA >60 ml/min/1.73m2 Calcium 8.7 8.3 - 10.4 MG/DL  
CBC WITH AUTOMATED DIFF Collection Time: 02/01/19  1:17 AM  
Result Value Ref Range WBC 7.8 4.3 - 11.1 K/uL  
 RBC 4.18 4.05 - 5.2 M/uL  
 HGB 11.9 11.7 - 15.4 g/dL HCT 36.3 35.8 - 46.3 % MCV 86.8 79.6 - 97.8 FL  
 MCH 28.5 26.1 - 32.9 PG  
 MCHC 32.8 31.4 - 35.0 g/dL  
 RDW 14.8 (H) 11.9 - 14.6 % PLATELET 205 907 - 137 K/uL MPV 9.1 (L) 9.4 - 12.3 FL ABSOLUTE NRBC 0.00 0.0 - 0.2 K/uL  
 DF AUTOMATED NEUTROPHILS 64 43 - 78 % LYMPHOCYTES 25 13 - 44 % MONOCYTES 9 4.0 - 12.0 % EOSINOPHILS 2 0.5 - 7.8 % BASOPHILS 0 0.0 - 2.0 % IMMATURE GRANULOCYTES 0 0.0 - 5.0 %  
 ABS. NEUTROPHILS 4.9 1.7 - 8.2 K/UL ABS. LYMPHOCYTES 1.9 0.5 - 4.6 K/UL  
 ABS. MONOCYTES 0.7 0.1 - 1.3 K/UL  
 ABS. EOSINOPHILS 0.2 0.0 - 0.8 K/UL  
 ABS. BASOPHILS 0.0 0.0 - 0.2 K/UL  
 ABS. IMM. GRANS. 0.0 0.0 - 0.5 K/UL HEPATIC FUNCTION PANEL Collection Time: 02/01/19  1:17 AM  
Result Value Ref Range Protein, total 8.1 g/dL Albumin 3.5 3.5 - 5.0 g/dL Globulin 4.6 (H) 2.3 - 3.5 g/dL A-G Ratio 0.8 Bilirubin, total 0.6 0.2 - 1.1 MG/DL Bilirubin, direct 0.1 <0.4 MG/DL Alk. phosphatase 74 50 - 136 U/L  
 AST (SGOT) 13 (L) 15 - 37 U/L  
 ALT (SGPT) 19 12 - 65 U/L Imaging Kilo Desir Smoke All diagnostic imaging personally reviewed by me. CT chest: 
Left lower lobe and lingular pneumonia with moderate pleural effusion on left Right subsegmental ACUTE PE 
 
CXR: 
Suggestive of LLL PNA Assessment and Plan: Active Hospital Problems Diagnosis Date Noted  Shortness of breath 02/01/2019  IVDU (intravenous drug user) 02/01/2019  Acute pulmonary embolism ([facility] [address] 02/01/2019  Hospital-acquired bacterial pneumonia 02/01/2019 PLAN 
·  Admit in view of LLL HAP with acute pulmonary embolism, non compliance with hx of IVDU. · Start IV zosyn and vancomycin · F/u with blood culture and sputum culture · Xopenex nebs with incentive spirometry · mucinex ER with prn guaifenesin for cough · F/u with HIV and hepatitis panel given hx of IVDU · Start therapeutic Lovenox, can be transitioned to NOAC on discharge. Will need CM assistance with medications. · Counseled for IVDU cessation · Check urine pregnancy test 
· F/u with UDS · Prn tylenol for fever and mild pain · Prn opioids for moderate to sever pain · Prn zofran for nausea/vomiting · IV fluids D5LR @ 125 cc/hr · F/U with 12 lead EKG Code Status: full High risk with opioids on board Anticipated discharge: 1-2 days Signed By: Elda Hunt MD   
 February 1, 2019

## 2019-02-01 NOTE — ED TRIAGE NOTES
C/o pain with deep inspiration for several days. States that she had chest pain on the mid lower left side of her chest and now it has radiated into her back. Unable to take a deep breath

## 2019-02-02 LAB
HAV IGM SERPL QL IA: NEGATIVE
HBV CORE IGM SERPL QL IA: NEGATIVE
HBV SURFACE AG SERPL QL IA: NEGATIVE
HCV AB S/CO SERPL IA: >11 S/CO RATIO (ref 0–0.9)
HIV 1+2 AB+HIV1 P24 AG SERPL QL IA: NON REACTIVE
MM INDURATION POC: NORMAL MM (ref 0–5)
PPD POC: NORMAL NEGATIVE
VANCOMYCIN TROUGH SERPL-MCNC: 2.6 UG/ML (ref 5–20)

## 2019-02-02 PROCEDURE — 76937 US GUIDE VASCULAR ACCESS: CPT

## 2019-02-02 PROCEDURE — 96376 TX/PRO/DX INJ SAME DRUG ADON: CPT

## 2019-02-02 PROCEDURE — 77030018786 HC NDL GD F/USND BARD -B

## 2019-02-02 PROCEDURE — 36415 COLL VENOUS BLD VENIPUNCTURE: CPT

## 2019-02-02 PROCEDURE — 74011250636 HC RX REV CODE- 250/636: Performed by: HOSPITALIST

## 2019-02-02 PROCEDURE — 74011250637 HC RX REV CODE- 250/637: Performed by: HOSPITALIST

## 2019-02-02 PROCEDURE — 36569 INSJ PICC 5 YR+ W/O IMAGING: CPT | Performed by: INTERNAL MEDICINE

## 2019-02-02 PROCEDURE — C1751 CATH, INF, PER/CENT/MIDLINE: HCPCS

## 2019-02-02 PROCEDURE — 80202 ASSAY OF VANCOMYCIN: CPT

## 2019-02-02 PROCEDURE — 99218 HC RM OBSERVATION: CPT

## 2019-02-02 PROCEDURE — C1894 INTRO/SHEATH, NON-LASER: HCPCS

## 2019-02-02 PROCEDURE — 96366 THER/PROPH/DIAG IV INF ADDON: CPT

## 2019-02-02 PROCEDURE — 96372 THER/PROPH/DIAG INJ SC/IM: CPT

## 2019-02-02 PROCEDURE — 77030020254 HC SOL INJ D5LR LACTATED RINGER

## 2019-02-02 PROCEDURE — 74011000258 HC RX REV CODE- 258: Performed by: HOSPITALIST

## 2019-02-02 RX ORDER — LEVALBUTEROL INHALATION SOLUTION 0.63 MG/3ML
0.63 SOLUTION RESPIRATORY (INHALATION)
Status: DISCONTINUED | OUTPATIENT
Start: 2019-02-02 | End: 2019-02-03 | Stop reason: HOSPADM

## 2019-02-02 RX ORDER — DEXTROSE, SODIUM CHLORIDE, SODIUM LACTATE, POTASSIUM CHLORIDE, AND CALCIUM CHLORIDE 5; .6; .31; .03; .02 G/100ML; G/100ML; G/100ML; G/100ML; G/100ML
125 INJECTION, SOLUTION INTRAVENOUS CONTINUOUS
Status: DISPENSED | OUTPATIENT
Start: 2019-02-02 | End: 2019-02-02

## 2019-02-02 RX ADMIN — OXYCODONE HYDROCHLORIDE AND ACETAMINOPHEN 1 TABLET: 7.5; 325 TABLET ORAL at 20:00

## 2019-02-02 RX ADMIN — GUAIFENESIN 1200 MG: 600 TABLET, EXTENDED RELEASE ORAL at 20:55

## 2019-02-02 RX ADMIN — PIPERACILLIN SODIUM,TAZOBACTAM SODIUM 4.5 G: 4; .5 INJECTION, POWDER, FOR SOLUTION INTRAVENOUS at 18:16

## 2019-02-02 RX ADMIN — Medication 1 AMPULE: at 09:00

## 2019-02-02 RX ADMIN — GUAIFENESIN 1200 MG: 600 TABLET, EXTENDED RELEASE ORAL at 08:59

## 2019-02-02 RX ADMIN — Medication 10 ML: at 21:02

## 2019-02-02 RX ADMIN — RIVAROXABAN 15 MG: 15 TABLET, FILM COATED ORAL at 17:25

## 2019-02-02 RX ADMIN — MORPHINE SULFATE 2 MG: 4 INJECTION INTRAVENOUS at 17:25

## 2019-02-02 RX ADMIN — VANCOMYCIN HYDROCHLORIDE 1000 MG: 1 INJECTION, POWDER, LYOPHILIZED, FOR SOLUTION INTRAVENOUS at 17:24

## 2019-02-02 RX ADMIN — ENOXAPARIN SODIUM 70 MG: 40 INJECTION SUBCUTANEOUS at 03:27

## 2019-02-02 RX ADMIN — OXYCODONE HYDROCHLORIDE AND ACETAMINOPHEN 1 TABLET: 7.5; 325 TABLET ORAL at 08:55

## 2019-02-02 RX ADMIN — Medication 1 AMPULE: at 20:55

## 2019-02-02 RX ADMIN — SENNOSIDES AND DOCUSATE SODIUM 1 TABLET: 8.6; 5 TABLET ORAL at 08:55

## 2019-02-02 NOTE — PROGRESS NOTES
Patient was discussed with MD this am and was provided with a Xarelto free 30 day supply voucher as well as contact information for Napoleon Martinez for free or reduced cost medications going forward (advised must call Monday). Patient provided with [facility] application and info for [facility], urged to follow up for continuity of care. Patient forthcoming about being in recovery for IV drug abuse. ADIN provided patient with contact information for IRIS Shelley  [email or web address]  
 
Discharge Location Discharge Placement: Home

## 2019-02-02 NOTE — PROGRESS NOTES
Notified of mews score of 4. I was just in with patient attempting to start an I.V. She was alert and appropriate. No distress noted.

## 2019-02-02 NOTE — PROGRESS NOTES
Unsuccessful Midline Placement Note Correct Procedure: yes time out completed assistant Eddie Bains RN all persons present in agreement with time out. PRE-PROCEDURE VERIFICATION Correct Site:  Yes Allergies verfied:  Yes Temperature: Temp: 99 °F (37.2 °C), Temperature Source: Temp Source: Oral 
Recent Labs 02/01/19 
0117 BUN 8  
CREA 0.74  WBC 7.8 Allergies: @AL PROCEDURE DETAIL A single lumen Midline was attempted for vascular access. Complication related to insertion: cephalic vein was accessed but guidewire would not advance. Lot #: [id number] Xylocaine used: yes Mid-Arm Circumference: 31 (cm)

## 2019-02-02 NOTE — PROGRESS NOTES
Dr. Manish Raymundo at bedside. Unsuccessful Midline and IV attempts by myself and Faith Smith. Dr. Manish Raymundo explained to patient the need for IV and attempted EJ in right neck unsuccessful. Verbal order from Dr. Manish Raymundo to place IV in foot. Patient agreeable and 22 gauge IV established in left foot by this RN.

## 2019-02-02 NOTE — PROGRESS NOTES
Shift assessment done. Received alert and oriented x4. Respirations even and unlabored. Abdomen soft with active bowel sounds. Instructed to call for assistance when needed. Bed low and locked. Call light within reach.

## 2019-02-02 NOTE — PROGRESS NOTES
Hospitalist Progress Note Admit Date:  2019 12:15 AM  
Name:  Miguelina Thomas Age:  26 y.o. 
:  1985 MRN:  [id number] PCP:  None Treatment Team: Attending Provider: Breanna Drummond MD; Utilization Review: Franco Rivera RN; Care Manager: Darlin Patel Subjective:  
 
Ms. Kiesha Oneil is a 27 yo female with PMH of polysubstance use, RUE DVT admitted with LLL pneumonia. CTA chest shows small RUL PE. BC pending. UDS positive for amphetamines/ THC/opiates. She has been started on vancomycin/zosyn, BC pending. She is also on treatment dose lovenox Lost periph iv again, one now in her leg, no severe resp spx Objective:  
 
Patient Vitals for the past 24 hrs: 
 Temp Pulse Resp BP SpO2  
19 1514 99 °F (37.2 °C) (!) 111 18 91/62 96 % 19 1158 98.1 °F (36.7 °C) (!) 103 18 101/66 96 % 19 0759 97 °F (36.1 °C) 87 18 100/71 96 % 19 0338 96.9 °F (36.1 °C) 85 18 92/59 94 % 19 2358 99.1 °F (37.3 °C) (!) 123 28 109/67 93 % 19 2025 98.8 °F (37.1 °C) (!) 125 22 110/62 100 % 19     97 % Oxygen Therapy O2 Sat (%): 96 % (19 1514) Pulse via Oximetry: 130 beats per minute (19) O2 Device: Room air (19 0750) Intake/Output Summary (Last 24 hours) at 2019 1800 Last data filed at 2019 1038 Gross per 24 hour Intake  Output 700 ml Net -700 ml *Note that automatically entered I/Os may not be accurate; dependent on patient compliance with collection and accurate  by assistants. General:    Well nourished. Alert. Appears unwell CV:   RRR. No murmur, rub, or gallop. No edema Lungs:   Crackles left lung field Abdomen:   Soft, nontender, nondistended. Extremities: Warm and dry. Skin:     No rashes or jaundice. Neuro:  No gross focal deficits Data Review: 
I have reviewed all labs, meds, telemetry events, and studies from the last 24 hours: Recent Results (from the past 24 hour(s)) Galina Flannery Collection Time: 02/02/19 10:23 AM  
Result Value Ref Range Vancomycin,trough 2.6 (L) 5 - 20 ug/mL All Micro Results Procedure Component Value Units Date/Time CULTURE, BLOOD [[id number]] Collected:  02/01/19 0301 Order Status:  Completed Specimen:  Blood Updated:  02/02/19 1132 Special Requests: --     
  RIGHT 
FOREARM Culture result: NO GROWTH 1 DAY     
 CULTURE, BLOOD [[id number]] Collected:  02/01/19 0310 Order Status:  Completed Specimen:  Blood Updated:  02/02/19 1132 Special Requests: --     
  RIGHT Antecubital 
  
  Culture result: NO GROWTH 1 DAY No results found for this visit on 02/01/19. Current Meds: 
Current Facility-Administered Medications Medication Dose Route Frequency  levalbuterol (XOPENEX) nebulizer soln 0.63 mg/3 mL  0.63 mg Nebulization Q4H PRN  
 dextrose 5% lactated ringers infusion  125 mL/hr IntraVENous CONTINUOUS  
 rivaroxaban (XARELTO) tablet 15 mg  15 mg Oral BID WITH MEALS  sodium chloride (NS) flush 5-40 mL  5-40 mL IntraVENous Q8H  
 sodium chloride (NS) flush 5-40 mL  5-40 mL IntraVENous PRN  
 acetaminophen (TYLENOL) tablet 650 mg  650 mg Oral Q6H PRN  
 oxyCODONE-acetaminophen (PERCOCET 7.5) 7.5-325 mg per tablet 1 Tab  1 Tab Oral Q8H PRN  
 naloxone (NARCAN) injection 0.4 mg  0.4 mg IntraVENous PRN  
 ondansetron (ZOFRAN) injection 4 mg  4 mg IntraVENous Q4H PRN  
 vancomycin (VANCOCIN) 1,000 mg in 0.9% sodium chloride (MBP/ADV) 250 mL  1,000 mg IntraVENous Q8H  
 guaiFENesin ER (MUCINEX) tablet 1,200 mg  1,200 mg Oral Q12H  
 guaiFENesin-dextromethorphan (ROBITUSSIN DM) 100-10 mg/5 mL syrup 5 mL  5 mL Oral Q6H PRN  piperacillin-tazobactam (ZOSYN) 4.5 g in 0.9% sodium chloride (MBP/ADV) 100 mL  4.5 g IntraVENous Q8H  
 alcohol 62% (NOZIN) nasal  1 Ampule  1 Ampule Topical Q12H  
 morphine injection 2 mg  2 mg IntraVENous Q4H PRN  
  VANCOMYCIN INFORMATION NOTE   Other Rx Dosing/Monitoring Other Studies (last 24 hours): No results found. Assessment and Plan:  
 
Hospital Problems as of 2/2/2019 Never Reviewed Codes Class Noted - Resolved POA Shortness of breath ICD-10-CM: R06.02 
ICD-9-CM: 786.05  2/1/2019 - Present Unknown IVDU (intravenous drug user) ICD-10-CM: F19.90 ICD-9-CM: 305.90  2/1/2019 - Present Unknown Acute pulmonary embolism ([facility] [address] ICD-10-CM: I26.99 
ICD-9-CM: 415.19  2/1/2019 - Present Unknown Hospital-acquired bacterial pneumonia ICD-10-CM: J15.9 ICD-9-CM: 482.9  2/1/2019 - Present Unknown Plan: 
· Sepsis with LLL pneumonia: continue vancomycin and zosyn, followup BC, check HCG 
· Polysubstance use: avoid IV narcotics as possible · Hypokalemia: replace and repeat · PE: on treatment dose lovenox, will need likely chronic anticoagulation at discharge as has prior DVT, needs hypercoagulable workup ·  
DC planning/Dispo:  Home when better in 1-2 days if blood Cs remain negative Diet:  DIET REGULAR 
DVT ppx:  lovenox Signed: 
Chivo Green MD

## 2019-02-03 VITALS
SYSTOLIC BLOOD PRESSURE: 98 MMHG | HEIGHT: 63 IN | TEMPERATURE: 98.6 F | RESPIRATION RATE: 18 BRPM | WEIGHT: 160 LBS | HEART RATE: 100 BPM | OXYGEN SATURATION: 96 % | DIASTOLIC BLOOD PRESSURE: 66 MMHG | BODY MASS INDEX: 28.35 KG/M2

## 2019-02-03 LAB
MM INDURATION POC: NORMAL MM (ref 0–5)
PPD POC: NORMAL NEGATIVE

## 2019-02-03 PROCEDURE — 96376 TX/PRO/DX INJ SAME DRUG ADON: CPT

## 2019-02-03 PROCEDURE — 99218 HC RM OBSERVATION: CPT

## 2019-02-03 PROCEDURE — 74011250636 HC RX REV CODE- 250/636: Performed by: HOSPITALIST

## 2019-02-03 PROCEDURE — 74011250637 HC RX REV CODE- 250/637: Performed by: HOSPITALIST

## 2019-02-03 PROCEDURE — 74011000258 HC RX REV CODE- 258: Performed by: HOSPITALIST

## 2019-02-03 PROCEDURE — 96366 THER/PROPH/DIAG IV INF ADDON: CPT

## 2019-02-03 PROCEDURE — 96372 THER/PROPH/DIAG INJ SC/IM: CPT

## 2019-02-03 RX ORDER — LEVOFLOXACIN 750 MG/1
750 TABLET ORAL DAILY
Qty: 7 TAB | Refills: 0 | Status: SHIPPED | OUTPATIENT
Start: 2019-02-03 | End: 2019-03-30

## 2019-02-03 RX ADMIN — VANCOMYCIN HYDROCHLORIDE 1000 MG: 1 INJECTION, POWDER, LYOPHILIZED, FOR SOLUTION INTRAVENOUS at 08:30

## 2019-02-03 RX ADMIN — MORPHINE SULFATE 2 MG: 4 INJECTION INTRAVENOUS at 01:44

## 2019-02-03 RX ADMIN — RIVAROXABAN 15 MG: 15 TABLET, FILM COATED ORAL at 08:32

## 2019-02-03 RX ADMIN — Medication 5 ML: at 05:56

## 2019-02-03 RX ADMIN — VANCOMYCIN HYDROCHLORIDE 1000 MG: 1 INJECTION, POWDER, LYOPHILIZED, FOR SOLUTION INTRAVENOUS at 01:29

## 2019-02-03 RX ADMIN — GUAIFENESIN 1200 MG: 600 TABLET, EXTENDED RELEASE ORAL at 08:32

## 2019-02-03 RX ADMIN — PIPERACILLIN SODIUM,TAZOBACTAM SODIUM 4.5 G: 4; .5 INJECTION, POWDER, FOR SOLUTION INTRAVENOUS at 01:33

## 2019-02-03 RX ADMIN — MORPHINE SULFATE 2 MG: 4 INJECTION INTRAVENOUS at 08:25

## 2019-02-03 RX ADMIN — PIPERACILLIN SODIUM,TAZOBACTAM SODIUM 4.5 G: 4; .5 INJECTION, POWDER, FOR SOLUTION INTRAVENOUS at 10:15

## 2019-02-03 RX ADMIN — Medication 1 AMPULE: at 08:32

## 2019-02-03 NOTE — PROGRESS NOTES
Assessment completed and documented. Lung sounds clear. Heart sounds regular. Abdomen soft. Bowel sounds hypoactive. Currently resting in bed. Will continue to monitor with hourly rounding.

## 2019-02-03 NOTE — DISCHARGE SUMMARY
Physician Discharge Summary     Patient ID:  Tulio Lot  [id number]  26 y.o.  1985    Admit date: 2/1/2019    Discharge date: 2-3-2019    Diagnosis:  1- Community acquired pneumonia, hx of IVD use, blood cultures negatives, treated with Zosyn and Vancomycin iv  2- DVT of right arm, no evidence of PE, started on xarelto to continue 3 months. 3- Hypokalemia, replaced. CTA OF THE CHEST - PE STUDY  HISTORY: Left-sided chest pain  COMPARISON: None  TECHNIQUE: A helical acquisition was performed through the chest utilizing  1.28NG slice thickness during the infusion of 100 cc of Isovue-370. 3-D  post-processed images were created on an independent workstation. Multiplanar  reformats were obtained. The exam was focused on the pulmonary arteries. Dose reduction techniques used: Automated exposure control, adjustment of the  mAs and/or kVp according to patient's size, and iterative reconstruction  techniques   FINDINGS:  *  PULMONARY VESSELS: No evidence of pulmonary embolism. *  PLEURA / PERICARDIUM: Moderate sized left pleural effusion. *  CINDY / MEDIASTINUM: Within normal limits. *  LUNGS: Left lower lobe and lingular airspace disease. *  TRACHEOBRONCHIAL TREE: Within normal limits. *  AORTA: Within normal limits. *  CORONARY ARTERIES: No coronary artery calcification is seen. *  CHEST WALL/AXILLA: Within normal limits. *  VISUALIZED UPPER ABDOMEN: Within normal limits. *  SPINE / BONES: Within normal limits. *  ADDITIONAL COMMENTS: None. IMPRESSION:  No evidence of pulmonary embolism. Left lower lobe and lingular pneumonia. Left pleural effusion. Date of Dictation: 2/1/2019 2:26 AM    Hospital course:  26years old female with pmhx of non compliance, IVDU, RUE DVT presented to ER with 2-3 days hx of shortness of breath and generalized body pain. Pt was in usual health until few days ago.  She reported sudden onset of cough, occasionally productive, a/w shortness of breath with exertion which is progressively getting worse. She also reports of chest pain on deep breathing, more on the left. She denies fever, chills, rigors, nausea/vomiting, diarrhea, urinary symptoms, abdominal pain. She was discharged on Xarelto in 11/2018, but never got her medications refilled. She states that she would have to go to [place] to get her meds as she is enrolled with [facility] [place]. In ER, CT chest showed LLL dense infiltrate with pleural effusion and a subsegmental right PE. Pt is tachycardic with HR >100, K 3.4. Pt stated she last used heroin about 4 months ago but to ER physician she reported 2 months ago. Patient admitted and treated as above. On day of discharge, patient exam showed clear lungs and she did well on room air. PCP: None    Patient Instructions:   Current Discharge Medication List      START taking these medications    Details   !! rivaroxaban (XARELTO) 15 mg tab tablet Take 1 Tab by mouth two (2) times daily (with meals). Then change to 20 mg daily  Qty: 40 Tab, Refills: 0      !! rivaroxaban (XARELTO) 20 mg tab tablet Take 1 Tab by mouth daily. Qty: 80 Tab, Refills: 0      levoFLOXacin (LEVAQUIN) 750 mg tablet Take 1 Tab by mouth daily. Qty: 7 Tab, Refills: 0       !! - Potential duplicate medications found. Please discuss with provider. Instructions:     Diet:  As tolerated    Activity: As tolerated. Condition: Stable    Follow-up with primary physician in 1-2 week. Time 35 min    Please send copy to primary physician.     Signed:  Melissa Carrillo MD  2/3/2019  2:26 PM

## 2019-02-03 NOTE — DISCHARGE INSTRUCTIONS
DISCHARGE SUMMARY from Nurse    PATIENT INSTRUCTIONS:    After general anesthesia or intravenous sedation, for 24 hours or while taking prescription Narcotics:  · Limit your activities  · Do not drive and operate hazardous machinery  · Do not make important personal or business decisions  · Do  not drink alcoholic beverages  · If you have not urinated within 8 hours after discharge, please contact your surgeon on call. Report the following to your surgeon:  · Excessive pain, swelling, redness or odor of or around the surgical area  · Temperature over 100.5  · Nausea and vomiting lasting longer than 4 hours or if unable to take medications  · Any signs of decreased circulation or nerve impairment to extremity: change in color, persistent  numbness, tingling, coldness or increase pain  · Any questions    What to do at Home:  Recommended activity: Activity as tolerated, complete antibiotics, Xarelto, f[facility]     If you experience any of the following symptoms worsening shortness of breath, chest pain, fever other concerning symptoms, please follow up with PCP and return to ER. *  Please give a list of your current medications to your Primary Care Provider. *  Please update this list whenever your medications are discontinued, doses are      changed, or new medications (including over-the-counter products) are added. *  Please carry medication information at all times in case of emergency situations. These are general instructions for a healthy lifestyle:    No smoking/ No tobacco products/ Avoid exposure to second hand smoke  Surgeon General's Warning:  Quitting smoking now greatly reduces serious risk to your health.     Obesity, smoking, and sedentary lifestyle greatly increases your risk for illness    A healthy diet, regular physical exercise & weight monitoring are important for maintaining a healthy lifestyle    You may be retaining fluid if you have a history of heart failure or if you experience any of the following symptoms:  Weight gain of 3 pounds or more overnight or 5 pounds in a week, increased swelling in our hands or feet or shortness of breath while lying flat in bed. Please call your doctor as soon as you notice any of these symptoms; do not wait until your next office visit. Recognize signs and symptoms of STROKE:    F-face looks uneven    A-arms unable to move or move unevenly    S-speech slurred or non-existent    T-time-call 911 as soon as signs and symptoms begin-DO NOT go       Back to bed or wait to see if you get better-TIME IS BRAIN. Warning Signs of HEART ATTACK     Call 911 if you have these symptoms:   Chest discomfort. Most heart attacks involve discomfort in the center of the chest that lasts more than a few minutes, or that goes away and comes back. It can feel like uncomfortable pressure, squeezing, fullness, or pain.  Discomfort in other areas of the upper body. Symptoms can include pain or discomfort in one or both arms, the back, neck, jaw, or stomach.  Shortness of breath with or without chest discomfort.  Other signs may include breaking out in a cold sweat, nausea, or lightheadedness. Don't wait more than five minutes to call 911 - MINUTES MATTER! Fast action can save your life. Calling 911 is almost always the fastest way to get lifesaving treatment. Emergency Medical Services staff can begin treatment when they arrive -- up to an hour sooner than if someone gets to the hospital by car. The discharge information has been reviewed with the patient. The patient verbalized understanding. Discharge medications reviewed with the patient and appropriate educational materials and side effects teaching were provided.   ___________________________________________________________________________________________________________________________________     Pneumonia: Care Instructions  Your Care Instructions    Pneumonia is an infection of the lungs. Most cases are caused by infections from bacteria or viruses. Pneumonia may be mild or very severe. If it is caused by bacteria, you will be treated with antibiotics. It may take a few weeks to a few months to recover fully from pneumonia, depending on how sick you were and whether your overall health is good. Follow-up care is a key part of your treatment and safety. Be sure to make and go to all appointments, and call your doctor if you are having problems. It's also a good idea to know your test results and keep a list of the medicines you take. How can you care for yourself at home? · Take your antibiotics exactly as directed. Do not stop taking the medicine just because you are feeling better. You need to take the full course of antibiotics. · Take your medicines exactly as prescribed. Call your doctor if you think you are having a problem with your medicine. · Get plenty of rest and sleep. You may feel weak and tired for a while, but your energy level will improve with time. · To prevent dehydration, drink plenty of fluids, enough so that your urine is light yellow or clear like water. Choose water and other caffeine-free clear liquids until you feel better. If you have kidney, heart, or liver disease and have to limit fluids, talk with your doctor before you increase the amount of fluids you drink. · Take care of your cough so you can rest. A cough that brings up mucus from your lungs is common with pneumonia. It is one way your body gets rid of the infection. But if coughing keeps you from resting or causes severe fatigue and chest-wall pain, talk to your doctor. He or she may suggest that you take a medicine to reduce the cough. · Use a vaporizer or humidifier to add moisture to your bedroom. Follow the directions for cleaning the machine. · Do not smoke or allow others to smoke around you. Smoke will make your cough last longer.  If you need help quitting, talk to your doctor about stop-smoking programs and medicines. These can increase your chances of quitting for good. · Take an over-the-counter pain medicine, such as acetaminophen (Tylenol), ibuprofen (Advil, Motrin), or naproxen (Aleve). Read and follow all instructions on the label. · Do not take two or more pain medicines at the same time unless the doctor told you to. Many pain medicines have acetaminophen, which is Tylenol. Too much acetaminophen (Tylenol) can be harmful. · If you were given a spirometer to measure how well your lungs are working, use it as instructed. This can help your doctor tell how your recovery is going. · To prevent pneumonia in the future, talk to your doctor about getting a flu vaccine (once a year) and a pneumococcal vaccine (one time only for most people). When should you call for help? Call 911 anytime you think you may need emergency care. For example, call if:    · You have severe trouble breathing.    Call your doctor now or seek immediate medical care if:    · You cough up dark brown or bloody mucus (sputum).     · You have new or worse trouble breathing.     · You are dizzy or lightheaded, or you feel like you may faint.    Watch closely for changes in your health, and be sure to contact your doctor if:    · You have a new or higher fever.     · You are coughing more deeply or more often.     · You are not getting better after 2 days (48 hours).     · You do not get better as expected. Where can you learn more? Go to http://julita-ariana.info/. Enter [id number] in the search box to learn more about \"Pneumonia: Care Instructions. \"  Current as of: September 5, 2018  Content Version: 11.9  © 7622-6869 [employer]. Care instructions adapted under license by [employer] (which disclaims liability or warranty for this information).  If you have questions about a medical condition or this instruction, always ask your healthcare professional. [employer] disclaims any warranty or liability for your use of this information. Patient Education        Pulmonary Embolism: Care Instructions  Your Care Instructions    Pulmonary embolism is the sudden blockage of an artery in the lung. Blood clots in the deep veins of the leg or pelvis (deep vein thrombosis, or DVT) are the most common cause. These blood clots can travel to the lungs. Pulmonary embolism can be very serious. Because you have had one pulmonary embolism, you are at greater risk for having another one. But you can take steps to prevent another pulmonary embolism by following your doctor's instructions. You will probably take a prescription blood-thinning medicine to prevent blood clots. A blood thinner can stop a blood clot from growing larger and prevent new clots from forming. Follow-up care is a key part of your treatment and safety. Be sure to make and go to all appointments, and call your doctor if you are having problems. It's also a good idea to know your test results and keep a list of the medicines you take. How can you care for yourself at home? · Take your medicines exactly as prescribed. Call your doctor if you think you are having a problem with your medicine. You will get more details on the specific medicines your doctor prescribes. · If you are taking a blood thinner, be sure you get instructions about how to take your medicine safely. Blood thinners can cause serious bleeding problems. Preventing future pulmonary embolisms  · Exercise. Keep blood moving in your legs to keep clots from forming. If you are traveling by car, stop every hour or so. Get out and walk around for a few minutes. If you are traveling by bus, train, or plane, get out of your seat and walk up and down the aisles every hour or so. You also can do leg exercises while you are seated. Pump your feet up and down by pulling your toes up toward your knees then pointing them down.   · Get up out of bed as soon as possible after an illness or surgery. · Do not smoke. If you need help quitting, talk to your doctor about stop-smoking programs and medicines. These can increase your chances of quitting for good. · Check with your doctor before taking hormone or birth control pills. These may increase your risk of blot clots. · Ask your doctor about wearing compression stockings to help prevent blood clots in your legs. There are different types of stockings, and they need to fit right. So your doctor will recommend what you need. When should you call for help? Call 911 anytime you think you may need emergency care. For example, call if:    · You have shortness of breath.     · You have chest pain.     · You passed out (lost consciousness).     · You cough up blood.    Call your doctor now or seek immediate medical care if:    · You have new or worsening pain or swelling in your leg.    Watch closely for changes in your health, and be sure to contact your doctor if:    · You do not get better as expected. Where can you learn more? Go to http://julita-ariana.info/. Enter I495 in the search box to learn more about \"Pulmonary Embolism: Care Instructions. \"  Current as of: September 26, 2018  Content Version: 11.9  © 7667-1149 [employer]. Care instructions adapted under license by [employer] (which disclaims liability or warranty for this information). If you have questions about a medical condition or this instruction, always ask your healthcare professional. [place] [address] any warranty or liability for your use of this information.

## 2019-02-03 NOTE — PROGRESS NOTES
Shift assessment done. Received alert and oriented x4. Respirations even and unlabored. Abdomen soft with active bowel sounds. Instructed to call for assistance when needed. Bed low and locked. Call light within reach. Will continue to monitor.

## 2019-02-03 NOTE — PROGRESS NOTES
Vancomycin Consult MD ordering: Milka BROWNE following? no Indication: HAP 
DOT:  7 days Goal level(s): 15 - 20 Ht Readings from Last 1 Encounters:  
19 5' 3\" (1.6 m) Wt Readings from Last 1 Encounters:  
19 72.6 kg (160 lb) Temp (24hrs), Av.7 °F (37.1 °C), Min:98.2 °F (36.8 °C), Max:99.2 °F (37.3 °C) Dosing weight: 73 kg 
26 y.o. Date:  Dose/Freq Admin Times Level/Time:  
2/1 1 gm IV x 1 dose 0320   
2/1 1 gm IV q8h 1223, 2000   
2/2 1 gm IV q8h (0400 and 1200 doses missed due to no IV access), 1724 Tr level @ 1023 = 2.6 (inaccurate due to missed doses) 2/3 1 gm IV q8h 0129, 0830, (1600) Tr level due at 1530 Recent Labs 19 
0117 BUN 8  
CREA 0.74 WBC 7.8 Estimated Creatinine Clearance: 101.3 mL/min (based on SCr of 0.74 mg/dL). Day 3 Assessment and Plan: 
Trough level yesterday was inaccurate due to missed doses. Pt lost IV access and missed 2 doses of Vancomycin yesterday. PICC line was placed and dosing resumed yesterday at 1724. Repeat trough level scheduled for today at 1530, prior to 1600 dose. Will continue to follow. Thank you, Francesco Thomson, LaialD

## 2019-02-06 LAB
BACTERIA SPEC CULT: NORMAL
BACTERIA SPEC CULT: NORMAL
SERVICE CMNT-IMP: NORMAL
SERVICE CMNT-IMP: NORMAL

## 2019-03-30 ENCOUNTER — HOSPITAL ENCOUNTER (EMERGENCY)
Age: 34
Discharge: HOME | End: 2019-03-30
Attending: EMERGENCY MEDICINE
Payer: SUBSIDIZED

## 2019-03-30 VITALS
BODY MASS INDEX: 29.23 KG/M2 | HEART RATE: 110 BPM | HEIGHT: 63 IN | TEMPERATURE: 98.5 F | WEIGHT: 165 LBS | RESPIRATION RATE: 20 BRPM | SYSTOLIC BLOOD PRESSURE: 117 MMHG | OXYGEN SATURATION: 99 % | DIASTOLIC BLOOD PRESSURE: 68 MMHG

## 2019-03-30 DIAGNOSIS — Z76.0 MEDICATION REFILL: Primary | ICD-10-CM

## 2019-03-30 PROCEDURE — 99283 EMERGENCY DEPT VISIT LOW MDM: CPT | Performed by: EMERGENCY MEDICINE

## 2019-03-30 NOTE — ED NOTES
I have reviewed discharge instructions with the patient. The patient verbalized understanding. Patient left ED via Discharge Method: ambulatory to Home with family/friend. Opportunity for questions and clarification provided. Patient given 1 script. To continue your aftercare when you leave the hospital, you may receive an automated call from our care team to check in on how you are doing. This is a free service and part of our promise to provide the best care and service to meet your aftercare needs.  If you have questions, or wish to unsubscribe from this service please call [phone]. Thank you for Choosing our [facility] Emergency Department.

## 2019-03-30 NOTE — ED PROVIDER NOTES
69-year-old female with h/o heroin abuse with recent pulmonary embolism and previous right upper extremity DVT presents for refills of xarelto. She was admitted to the hospital in February and diagnosed with a pulmonary embolus. She never filled her prescription because she lost it. She states she is trying to get into the [facility] and they will not accept her unless she has her prescription. She is also requesting information for Suboxone clinics and a prescription for 800 mg of gabapentin for restless legs. She has no chest pain or shortness of breath. History reviewed. No pertinent past medical history. Past Surgical History:  
Procedure Laterality Date  HX  SECTION    
 HX GYN    
 HX ORTHOPAEDIC  2018  
 left arm  HX TUBAL LIGATION Bilateral   
 
   
History reviewed. No pertinent family history. Social History Socioeconomic History  Marital status: SINGLE Spouse name: Not on file  Number of children: Not on file  Years of education: Not on file  Highest education level: Not on file Occupational History  Not on file Social Needs  Financial resource strain: Not on file  Food insecurity:  
  Worry: Not on file Inability: Not on file  Transportation needs:  
  Medical: Not on file Non-medical: Not on file Tobacco Use  Smoking status: Never Smoker  Smokeless tobacco: Never Used Substance and Sexual Activity  Alcohol use: No  
  Frequency: Never  Drug use: Yes Types: Marijuana, Methamphetamines, Heroin, Opiates Comment: daily use  Sexual activity: Not on file Lifestyle  Physical activity:  
  Days per week: Not on file Minutes per session: Not on file  Stress: Not on file Relationships  Social connections:  
  Talks on phone: Not on file Gets together: Not on file Attends Protestant service: Not on file Active member of club or organization: Not on file Attends meetings of clubs or organizations: Not on file Relationship status: Not on file  Intimate partner violence:  
  Fear of current or ex partner: Not on file Emotionally abused: Not on file Physically abused: Not on file Forced sexual activity: Not on file Other Topics Concern  Not on file Social History Narrative  Not on file ALLERGIES: Patient has no known allergies. Review of Systems Constitutional: Negative for fever. Respiratory: Negative for shortness of breath. Cardiovascular: Negative for chest pain. Vitals:  
 03/30/19 0129 BP: 122/78 Pulse: (!) 113 Resp: 20 Temp: 98.5 °F (36.9 °C) Weight: 74.8 kg (165 lb) Height: 5' 3\" (1.6 m) Physical Exam  
Constitutional: She appears well-developed and well-nourished. HENT:  
Head: Normocephalic and atraumatic. Eyes: Pupils are equal, round, and reactive to light. EOM are normal.  
Neck: Neck supple. Cardiovascular: Regular rhythm. Tachycardia present. Pulmonary/Chest: Effort normal and breath sounds normal.  
Neurological: She is alert. Skin: Skin is dry. Nursing note and vitals reviewed. MDM Number of Diagnoses or Management Options Medication refill:  
Diagnosis management comments: Parts of this document were created using dragon voice recognition software. The chart has been reviewed but errors may still be present. Denied refill of gabapentin due to potential abuse. Given information for [facility] and refilled xarelto I discussed the results of all labs, procedures, radiographs, and treatments with the patient and available family. Treatment plan is agreed upon and the patient is ready for discharge. Questions about treatment in the ED and differential diagnosis of presenting condition were answered.   Patient was given verbal discharge instructions including, but not limited to, importance of returning to the emergency department for any concern of worsening or continued symptoms. Instructions were given to follow up with a primary care provider or specialist within 1-2 days. Adverse effects of medications, if prescribed, were discussed and patient was advised to refrain from significant physical activity until followed up by primary care physician and to not drive or operate heavy machinery after taking any sedating substances. Procedures

## 2019-03-31 ENCOUNTER — HOSPITAL ENCOUNTER (EMERGENCY)
Age: 34
Discharge: LEFT BEFORE DISCHARGE | End: 2019-03-31
Attending: EMERGENCY MEDICINE
Payer: SUBSIDIZED

## 2019-03-31 VITALS
DIASTOLIC BLOOD PRESSURE: 82 MMHG | RESPIRATION RATE: 18 BRPM | TEMPERATURE: 98 F | HEART RATE: 109 BPM | SYSTOLIC BLOOD PRESSURE: 127 MMHG | OXYGEN SATURATION: 98 %

## 2019-03-31 DIAGNOSIS — R10.9 FLANK PAIN: Primary | ICD-10-CM

## 2019-03-31 PROCEDURE — 99283 EMERGENCY DEPT VISIT LOW MDM: CPT | Performed by: EMERGENCY MEDICINE

## 2019-03-31 RX ORDER — KETOROLAC TROMETHAMINE 30 MG/ML
30 INJECTION, SOLUTION INTRAMUSCULAR; INTRAVENOUS
Status: DISCONTINUED | OUTPATIENT
Start: 2019-03-31 | End: 2019-03-31 | Stop reason: HOSPADM

## 2019-03-31 RX ORDER — SODIUM CHLORIDE 0.9 % (FLUSH) 0.9 %
5-40 SYRINGE (ML) INJECTION AS NEEDED
Status: DISCONTINUED | OUTPATIENT
Start: 2019-03-31 | End: 2019-03-31 | Stop reason: HOSPADM

## 2019-03-31 NOTE — ED PROVIDER NOTES
68-year-old female presents with severe right-sided flank pain intermittently throughout the day today. She denies any burning with urination, blood in urine, vaginal discharge or bleeding. Pain feels similar to prior kidney stones. She has nausea and 2 episodes of vomiting. She was seen in the emergency department last night for refill of xarelto. She had not taken it since discharge from the hospital after diagnosis of PE last month. She has not yet picked up the prescription. She has a history of heroin abuse and is waiting for a bed at [facility] once she picks up her prescription for Xarelto. She reports last using heroin about 2 days ago. She did take some Xanax and Lortab tonight with minimal improvement in pain. She denies fevers or chills. No areas of bleeding. Denies chest pain, but does have some shortness of breath. History reviewed. No pertinent past medical history. Past Surgical History:  
Procedure Laterality Date  HX  SECTION    
 HX GYN    
 HX ORTHOPAEDIC  2018  
 left arm  HX TUBAL LIGATION Bilateral   
 
   
History reviewed. No pertinent family history. Social History Socioeconomic History  Marital status: SINGLE Spouse name: Not on file  Number of children: Not on file  Years of education: Not on file  Highest education level: Not on file Occupational History  Not on file Social Needs  Financial resource strain: Not on file  Food insecurity:  
  Worry: Not on file Inability: Not on file  Transportation needs:  
  Medical: Not on file Non-medical: Not on file Tobacco Use  Smoking status: Never Smoker  Smokeless tobacco: Never Used Substance and Sexual Activity  Alcohol use: No  
  Frequency: Never  Drug use: Yes Types: Marijuana, Methamphetamines, Heroin, Opiates Comment: daily use  Sexual activity: Not on file Lifestyle  Physical activity: Days per week: Not on file Minutes per session: Not on file  Stress: Not on file Relationships  Social connections:  
  Talks on phone: Not on file Gets together: Not on file Attends [employer] service: Not on file Active member of club or organization: Not on file Attends meetings of clubs or organizations: Not on file Relationship status: Not on file  Intimate partner violence:  
  Fear of current or ex partner: Not on file Emotionally abused: Not on file Physically abused: Not on file Forced sexual activity: Not on file Other Topics Concern  Not on file Social History Narrative  Not on file ALLERGIES: Patient has no known allergies. Review of Systems Constitutional: Negative for chills and fever. HENT: Negative for hearing loss. Eyes: Negative for visual disturbance. Respiratory: Positive for shortness of breath. Negative for cough. Cardiovascular: Negative for chest pain and palpitations. Gastrointestinal: Positive for abdominal pain, nausea and vomiting. Negative for diarrhea. Genitourinary: Positive for flank pain. Negative for difficulty urinating, dysuria, hematuria, vaginal bleeding and vaginal discharge. Musculoskeletal: Positive for back pain. Skin: Negative for rash. Neurological: Negative for weakness and headaches. Psychiatric/Behavioral: Negative for confusion. Vitals:  
 03/31/19 5615 BP: 127/82 Pulse: (!) 109 Resp: 18 Temp: 98 °F (36.7 °C) SpO2: 98% Physical Exam  
Constitutional: She appears well-developed and well-nourished. Appears uncomfortable HENT:  
Head: Normocephalic and atraumatic. Eyes: Pupils are equal, round, and reactive to light. EOM are normal.  
Neck: Normal range of motion. Neck supple. Cardiovascular: Regular rhythm and normal heart sounds. Tachycardia present.   
Pulmonary/Chest: Effort normal and breath sounds normal.  
 Abdominal: Soft. There is tenderness in the right upper quadrant, right lower quadrant and suprapubic area. There is CVA tenderness. Musculoskeletal: Normal range of motion. Neurological: She is alert. Skin: Skin is warm and dry. Psychiatric: Her behavior is normal.  
Nursing note and vitals reviewed. MDM Number of Diagnoses or Management Options Diagnosis management comments: Parts of this document were created using dragon voice recognition software. The chart has been reviewed but errors may still be present. 3:59 AM 
Blood hemolyzed. When RN attempted to We re-collect sample, patient refused and left the department. Also did not provide a urine. Amount and/or Complexity of Data Reviewed Clinical lab tests: ordered and reviewed Tests in the medicine section of CPT®: reviewed and ordered Procedures

## 2019-03-31 NOTE — ED NOTES
Attempted PIV x2. Obtained blood sample but pt refused IV, stating it hurts too much and she is going to have a panic attack. States last time she got a PICC line placed.

## 2019-03-31 NOTE — ED NOTES
Patient refused to have labs redrawn or IV placed. Patient states, \"I'm refuse. I'm just going to go home. I don't want to be here. \"   Patient ambulatory from ER. MD and charge nurse notified.

## 2019-05-09 ENCOUNTER — HOSPITAL ENCOUNTER (INPATIENT)
Age: 34
LOS: 24 days | Discharge: HOME | DRG: 871 | End: 2019-06-02
Attending: EMERGENCY MEDICINE | Admitting: FAMILY MEDICINE
Payer: SUBSIDIZED

## 2019-05-09 DIAGNOSIS — F19.10 IV DRUG ABUSE (HCC): ICD-10-CM

## 2019-05-09 DIAGNOSIS — N39.0 URINARY TRACT INFECTION WITHOUT HEMATURIA, SITE UNSPECIFIED: ICD-10-CM

## 2019-05-09 DIAGNOSIS — R65.20 SEVERE SEPSIS (HCC): Primary | ICD-10-CM

## 2019-05-09 DIAGNOSIS — A41.9 SEVERE SEPSIS (HCC): Primary | ICD-10-CM

## 2019-05-09 PROBLEM — Z86.711 HISTORY OF PULMONARY EMBOLISM: Status: ACTIVE | Noted: 2019-05-09

## 2019-05-09 PROBLEM — L03.113 CELLULITIS OF RIGHT ARM: Status: RESOLVED | Noted: 2018-11-11 | Resolved: 2019-05-09

## 2019-05-09 PROBLEM — I26.99 ACUTE PULMONARY EMBOLISM (HCC): Status: RESOLVED | Noted: 2019-02-01 | Resolved: 2019-05-09

## 2019-05-09 PROBLEM — E87.6 HYPOKALEMIA: Status: ACTIVE | Noted: 2019-05-09

## 2019-05-09 PROBLEM — R06.02 SHORTNESS OF BREATH: Status: RESOLVED | Noted: 2019-02-01 | Resolved: 2019-05-09

## 2019-05-09 PROBLEM — J15.9 HOSPITAL-ACQUIRED BACTERIAL PNEUMONIA: Status: RESOLVED | Noted: 2019-02-01 | Resolved: 2019-05-09

## 2019-05-09 LAB
ALBUMIN SERPL-MCNC: 2.6 G/DL (ref 3.5–5)
ALBUMIN/GLOB SERPL: 0.5 {RATIO} (ref 1.2–3.5)
ALP SERPL-CCNC: 198 U/L (ref 50–136)
ALT SERPL-CCNC: 62 U/L (ref 12–65)
AMPHET UR QL SCN: POSITIVE
ANION GAP SERPL CALC-SCNC: 13 MMOL/L (ref 7–16)
ARTERIAL PATENCY WRIST A: ABNORMAL
AST SERPL-CCNC: 25 U/L (ref 15–37)
ATRIAL RATE: 132 BPM
BACTERIA URNS QL MICRO: ABNORMAL /HPF
BARBITURATES UR QL SCN: NEGATIVE
BASE DEFICIT BLD-SCNC: 3 MMOL/L
BASOPHILS # BLD: 0.2 K/UL (ref 0–0.2)
BASOPHILS NFR BLD: 1 % (ref 0–2)
BDY SITE: ABNORMAL
BENZODIAZ UR QL: NEGATIVE
BILIRUB SERPL-MCNC: 0.5 MG/DL (ref 0.2–1.1)
BODY TEMPERATURE: 98.6
BUN SERPL-MCNC: 33 MG/DL (ref 6–23)
CALCIUM SERPL-MCNC: 9.2 MG/DL (ref 8.3–10.4)
CALCULATED P AXIS, ECG09: 68 DEGREES
CALCULATED R AXIS, ECG10: 80 DEGREES
CALCULATED T AXIS, ECG11: 47 DEGREES
CANNABINOIDS UR QL SCN: NEGATIVE
CASTS URNS QL MICRO: ABNORMAL /LPF
CHLORIDE SERPL-SCNC: 91 MMOL/L (ref 98–107)
CK SERPL-CCNC: 40 U/L (ref 21–215)
CO2 BLD-SCNC: 21 MMOL/L
CO2 SERPL-SCNC: 25 MMOL/L (ref 21–32)
COCAINE UR QL SCN: NEGATIVE
COLLECT TIME,HTIME: 1815
CREAT SERPL-MCNC: 1.44 MG/DL (ref 0.6–1)
DIAGNOSIS, 93000: NORMAL
DIFFERENTIAL METHOD BLD: ABNORMAL
EOSINOPHIL # BLD: 0 K/UL (ref 0–0.8)
EOSINOPHIL NFR BLD: 0 % (ref 0.5–7.8)
EPI CELLS #/AREA URNS HPF: 0 /HPF
ERYTHROCYTE [DISTWIDTH] IN BLOOD BY AUTOMATED COUNT: 15.2 % (ref 11.9–14.6)
ETHANOL SERPL-MCNC: <3 MG/DL
GAS FLOW.O2 O2 DELIVERY SYS: ABNORMAL L/MIN
GLOBULIN SER CALC-MCNC: 5.6 G/DL (ref 2.3–3.5)
GLUCOSE SERPL-MCNC: 124 MG/DL (ref 65–100)
HCG UR QL: NEGATIVE
HCO3 BLD-SCNC: 20.4 MMOL/L (ref 22–26)
HCT VFR BLD AUTO: 41.1 % (ref 35.8–46.3)
HGB BLD-MCNC: 14.3 G/DL (ref 11.7–15.4)
IMM GRANULOCYTES # BLD AUTO: 1 K/UL (ref 0–0.5)
IMM GRANULOCYTES NFR BLD AUTO: 5 % (ref 0–5)
LACTATE BLD-SCNC: 2.17 MMOL/L (ref 0.5–1.9)
LACTATE BLD-SCNC: 3.8 MMOL/L (ref 0.5–1.9)
LYMPHOCYTES # BLD: 1.2 K/UL (ref 0.5–4.6)
LYMPHOCYTES NFR BLD: 6 % (ref 13–44)
MAGNESIUM SERPL-MCNC: 2.4 MG/DL (ref 1.8–2.4)
MCH RBC QN AUTO: 28.4 PG (ref 26.1–32.9)
MCHC RBC AUTO-ENTMCNC: 34.8 G/DL (ref 31.4–35)
MCV RBC AUTO: 81.5 FL (ref 79.6–97.8)
METHADONE UR QL: NEGATIVE
MONOCYTES # BLD: 0.6 K/UL (ref 0.1–1.3)
MONOCYTES NFR BLD: 3 % (ref 4–12)
NEUTS SEG # BLD: 17.6 K/UL (ref 1.7–8.2)
NEUTS SEG NFR BLD: 85 % (ref 43–78)
NRBC # BLD: 0 K/UL (ref 0–0.2)
O2/TOTAL GAS SETTING VFR VENT: 21 %
OPIATES UR QL: POSITIVE
P-R INTERVAL, ECG05: 120 MS
PCO2 BLD: 30.7 MMHG (ref 35–45)
PCP UR QL: NEGATIVE
PH BLD: 7.43 [PH] (ref 7.35–7.45)
PLATELET # BLD AUTO: 164 K/UL (ref 150–450)
PLATELET COMMENTS,PCOM: ADEQUATE
PMV BLD AUTO: 12.5 FL (ref 9.4–12.3)
PO2 BLD: 72 MMHG (ref 75–100)
POTASSIUM SERPL-SCNC: 2.9 MMOL/L (ref 3.5–5.1)
PROT SERPL-MCNC: 8.2 G/DL (ref 6.3–8.2)
Q-T INTERVAL, ECG07: 394 MS
QRS DURATION, ECG06: 80 MS
QTC CALCULATION (BEZET), ECG08: 583 MS
RBC # BLD AUTO: 5.04 M/UL (ref 4.05–5.2)
RBC #/AREA URNS HPF: ABNORMAL /HPF
RBC MORPH BLD: ABNORMAL
SAO2 % BLD: 95 % (ref 95–98)
SERVICE CMNT-IMP: ABNORMAL
SODIUM SERPL-SCNC: 129 MMOL/L (ref 136–145)
SPECIMEN TYPE: ABNORMAL
TROPONIN I SERPL-MCNC: <0.02 NG/ML (ref 0.02–0.05)
VENTRICULAR RATE, ECG03: 132 BPM
WBC # BLD AUTO: 20.6 K/UL (ref 4.3–11.1)
WBC MORPH BLD: ABNORMAL
WBC URNS QL MICRO: ABNORMAL /HPF

## 2019-05-09 PROCEDURE — 74011250636 HC RX REV CODE- 250/636: Performed by: EMERGENCY MEDICINE

## 2019-05-09 PROCEDURE — 74011250637 HC RX REV CODE- 250/637: Performed by: EMERGENCY MEDICINE

## 2019-05-09 PROCEDURE — 87086 URINE CULTURE/COLONY COUNT: CPT

## 2019-05-09 PROCEDURE — 80307 DRUG TEST PRSMV CHEM ANLYZR: CPT

## 2019-05-09 PROCEDURE — 81003 URINALYSIS AUTO W/O SCOPE: CPT | Performed by: EMERGENCY MEDICINE

## 2019-05-09 PROCEDURE — 82803 BLOOD GASES ANY COMBINATION: CPT

## 2019-05-09 PROCEDURE — 81025 URINE PREGNANCY TEST: CPT

## 2019-05-09 PROCEDURE — 93005 ELECTROCARDIOGRAM TRACING: CPT | Performed by: EMERGENCY MEDICINE

## 2019-05-09 PROCEDURE — 74011000258 HC RX REV CODE- 258: Performed by: FAMILY MEDICINE

## 2019-05-09 PROCEDURE — 74011250637 HC RX REV CODE- 250/637: Performed by: FAMILY MEDICINE

## 2019-05-09 PROCEDURE — 74011250637 HC RX REV CODE- 250/637: Performed by: HOSPITALIST

## 2019-05-09 PROCEDURE — 84484 ASSAY OF TROPONIN QUANT: CPT

## 2019-05-09 PROCEDURE — 87040 BLOOD CULTURE FOR BACTERIA: CPT

## 2019-05-09 PROCEDURE — 36600 WITHDRAWAL OF ARTERIAL BLOOD: CPT

## 2019-05-09 PROCEDURE — 83735 ASSAY OF MAGNESIUM: CPT

## 2019-05-09 PROCEDURE — 96361 HYDRATE IV INFUSION ADD-ON: CPT | Performed by: EMERGENCY MEDICINE

## 2019-05-09 PROCEDURE — 87641 MR-STAPH DNA AMP PROBE: CPT

## 2019-05-09 PROCEDURE — 80053 COMPREHEN METABOLIC PANEL: CPT

## 2019-05-09 PROCEDURE — 74011000258 HC RX REV CODE- 258: Performed by: EMERGENCY MEDICINE

## 2019-05-09 PROCEDURE — 74011250636 HC RX REV CODE- 250/636: Performed by: FAMILY MEDICINE

## 2019-05-09 PROCEDURE — 96365 THER/PROPH/DIAG IV INF INIT: CPT | Performed by: EMERGENCY MEDICINE

## 2019-05-09 PROCEDURE — 87077 CULTURE AEROBIC IDENTIFY: CPT

## 2019-05-09 PROCEDURE — 99285 EMERGENCY DEPT VISIT HI MDM: CPT | Performed by: EMERGENCY MEDICINE

## 2019-05-09 PROCEDURE — 87205 SMEAR GRAM STAIN: CPT

## 2019-05-09 PROCEDURE — 87088 URINE BACTERIA CULTURE: CPT

## 2019-05-09 PROCEDURE — 77030020263 HC SOL INJ SOD CL0.9% LFCR 1000ML

## 2019-05-09 PROCEDURE — 65270000029 HC RM PRIVATE

## 2019-05-09 PROCEDURE — 83605 ASSAY OF LACTIC ACID: CPT

## 2019-05-09 PROCEDURE — 87186 SC STD MICRODIL/AGAR DIL: CPT

## 2019-05-09 PROCEDURE — 81015 MICROSCOPIC EXAM OF URINE: CPT

## 2019-05-09 PROCEDURE — 96375 TX/PRO/DX INJ NEW DRUG ADDON: CPT | Performed by: EMERGENCY MEDICINE

## 2019-05-09 PROCEDURE — 85025 COMPLETE CBC W/AUTO DIFF WBC: CPT

## 2019-05-09 PROCEDURE — 82550 ASSAY OF CK (CPK): CPT

## 2019-05-09 RX ORDER — MORPHINE SULFATE 2 MG/ML
2 INJECTION, SOLUTION INTRAMUSCULAR; INTRAVENOUS
Status: DISCONTINUED | OUTPATIENT
Start: 2019-05-09 | End: 2019-05-10

## 2019-05-09 RX ORDER — SODIUM CHLORIDE 0.9 % (FLUSH) 0.9 %
5-40 SYRINGE (ML) INJECTION EVERY 8 HOURS
Status: DISCONTINUED | OUTPATIENT
Start: 2019-05-09 | End: 2019-06-02 | Stop reason: HOSPADM

## 2019-05-09 RX ORDER — VANCOMYCIN 2 GRAM/500 ML IN 0.9 % SODIUM CHLORIDE INTRAVENOUS
2000 ONCE
Status: COMPLETED | OUTPATIENT
Start: 2019-05-09 | End: 2019-05-09

## 2019-05-09 RX ORDER — OXYCODONE AND ACETAMINOPHEN 10; 325 MG/1; MG/1
1 TABLET ORAL
Status: DISCONTINUED | OUTPATIENT
Start: 2019-05-09 | End: 2019-05-10

## 2019-05-09 RX ORDER — ACETAMINOPHEN 325 MG/1
650 TABLET ORAL
Status: DISCONTINUED | OUTPATIENT
Start: 2019-05-09 | End: 2019-05-12

## 2019-05-09 RX ORDER — SODIUM CHLORIDE 9 MG/ML
150 INJECTION, SOLUTION INTRAVENOUS CONTINUOUS
Status: DISCONTINUED | OUTPATIENT
Start: 2019-05-09 | End: 2019-05-11

## 2019-05-09 RX ORDER — PHENAZOPYRIDINE HYDROCHLORIDE 95 MG/1
200 TABLET ORAL EVERY 8 HOURS
Status: COMPLETED | OUTPATIENT
Start: 2019-05-09 | End: 2019-05-11

## 2019-05-09 RX ORDER — POTASSIUM CHLORIDE 20 MEQ/1
60 TABLET, EXTENDED RELEASE ORAL
Status: COMPLETED | OUTPATIENT
Start: 2019-05-09 | End: 2019-05-09

## 2019-05-09 RX ORDER — ONDANSETRON 2 MG/ML
4 INJECTION INTRAMUSCULAR; INTRAVENOUS
Status: DISCONTINUED | OUTPATIENT
Start: 2019-05-09 | End: 2019-06-02 | Stop reason: HOSPADM

## 2019-05-09 RX ORDER — SODIUM CHLORIDE 0.9 % (FLUSH) 0.9 %
5-40 SYRINGE (ML) INJECTION AS NEEDED
Status: DISCONTINUED | OUTPATIENT
Start: 2019-05-09 | End: 2019-06-02 | Stop reason: HOSPADM

## 2019-05-09 RX ORDER — TRAMADOL HYDROCHLORIDE 50 MG/1
50 TABLET ORAL
Status: DISCONTINUED | OUTPATIENT
Start: 2019-05-09 | End: 2019-05-10

## 2019-05-09 RX ADMIN — Medication 1 AMPULE: at 22:55

## 2019-05-09 RX ADMIN — Medication 10 ML: at 23:30

## 2019-05-09 RX ADMIN — ACETAMINOPHEN 650 MG: 325 TABLET, FILM COATED ORAL at 22:58

## 2019-05-09 RX ADMIN — SODIUM CHLORIDE 1000 ML: 900 INJECTION, SOLUTION INTRAVENOUS at 19:15

## 2019-05-09 RX ADMIN — SODIUM CHLORIDE 150 ML/HR: 900 INJECTION, SOLUTION INTRAVENOUS at 22:37

## 2019-05-09 RX ADMIN — POTASSIUM CHLORIDE 60 MEQ: 20 TABLET, EXTENDED RELEASE ORAL at 22:55

## 2019-05-09 RX ADMIN — TRAMADOL HYDROCHLORIDE 50 MG: 50 TABLET, FILM COATED ORAL at 23:27

## 2019-05-09 RX ADMIN — RIVAROXABAN 20 MG: 20 TABLET, FILM COATED ORAL at 20:42

## 2019-05-09 RX ADMIN — SODIUM CHLORIDE 1000 ML: 900 INJECTION, SOLUTION INTRAVENOUS at 20:46

## 2019-05-09 RX ADMIN — PIPERACILLIN SODIUM,TAZOBACTAM SODIUM 4.5 G: 4; .5 INJECTION, POWDER, FOR SOLUTION INTRAVENOUS at 19:40

## 2019-05-09 RX ADMIN — SODIUM CHLORIDE 1000 ML: 900 INJECTION, SOLUTION INTRAVENOUS at 17:46

## 2019-05-09 RX ADMIN — CEFTRIAXONE SODIUM 1 G: 1 INJECTION, POWDER, FOR SOLUTION INTRAMUSCULAR; INTRAVENOUS at 22:55

## 2019-05-09 RX ADMIN — VANCOMYCIN HYDROCHLORIDE 2000 MG: 10 INJECTION, POWDER, LYOPHILIZED, FOR SOLUTION INTRAVENOUS at 20:41

## 2019-05-09 RX ADMIN — URINARY PAIN RELIEF 190 MG: 95 TABLET ORAL at 23:29

## 2019-05-09 NOTE — ED PROVIDER NOTES
20-year-old female presents with a 2-3 day history of diffuse myalgias and chest pain with shortness of breath Patient was admitted to the hospital in February of this year, diagnosed with an upper extremity DVT and a very small peripheral PE. She was placed on the relatively that time. She is nonger taking his route on his not clear when she ran out of that medication. She has failed to follow up She continues to abuse heroin and methamphetamine Patient reports that the chest pain is worse with deep breaths. It is diffuse, not associated with a cough or fever Patient has had denies any recent arm or leg swelling Records review, the patient is also positive for hepatitis C The history is provided by the patient. Chest Pain This is a recurrent problem. The current episode started more than 2 days ago. The problem has been gradually worsening. Duration of episode(s) is 2 days. The pain is associated with drug use. Pain location: diffuse. The pain is severe. The quality of the pain is described as stabbing and sharp. The pain does not radiate. The symptoms are aggravated by certain positions and deep breathing. Associated symptoms include back pain, malaise/fatigue, nausea, shortness of breath and weakness. Pertinent negatives include no abdominal pain, no cough, no diaphoresis, no exertional chest pressure, no fever, no headaches, no irregular heartbeat, no lower extremity edema, no palpitations, no sputum production and no vomiting. She has tried nothing for the symptoms. Risk factors: IV drug abuse. Her past medical history is significant for DM and DVT. Her past medical history does not include cancer or HTN. History reviewed. No pertinent past medical history. Past Surgical History:  
Procedure Laterality Date  HX  SECTION    
 HX GYN    
 HX ORTHOPAEDIC  2018  
 left arm  HX TUBAL LIGATION Bilateral   
 
   
History reviewed. No pertinent family history. Social History Socioeconomic History  Marital status: SINGLE Spouse name: Not on file  Number of children: Not on file  Years of education: Not on file  Highest education level: Not on file Occupational History  Not on file Social Needs  Financial resource strain: Not on file  Food insecurity:  
  Worry: Not on file Inability: Not on file  Transportation needs:  
  Medical: Not on file Non-medical: Not on file Tobacco Use  Smoking status: Never Smoker  Smokeless tobacco: Never Used Substance and Sexual Activity  Alcohol use: No  
  Frequency: Never  Drug use: Yes Types: Marijuana, Methamphetamines, Heroin, Opiates Comment: daily use  Sexual activity: Not on file Lifestyle  Physical activity:  
  Days per week: Not on file Minutes per session: Not on file  Stress: Not on file Relationships  Social connections:  
  Talks on phone: Not on file Gets together: Not on file Attends [employer] service: Not on file Active member of club or organization: Not on file Attends meetings of clubs or organizations: Not on file Relationship status: Not on file  Intimate partner violence:  
  Fear of current or ex partner: Not on file Emotionally abused: Not on file Physically abused: Not on file Forced sexual activity: Not on file Other Topics Concern  Not on file Social History Narrative  Not on file ALLERGIES: Patient has no known allergies. Review of Systems Constitutional: Positive for malaise/fatigue. Negative for chills, diaphoresis and fever. HENT: Negative for congestion, ear pain and rhinorrhea. Eyes: Negative for photophobia and discharge. Respiratory: Positive for shortness of breath. Negative for cough and sputum production. Cardiovascular: Positive for chest pain. Negative for palpitations. Gastrointestinal: Positive for nausea.  Negative for abdominal pain, constipation, diarrhea and vomiting. Endocrine: Negative for cold intolerance and heat intolerance. Genitourinary: Negative for dysuria and flank pain. Musculoskeletal: Positive for back pain and myalgias. Negative for arthralgias and neck pain. Skin: Negative for rash and wound. Allergic/Immunologic: Negative for environmental allergies and food allergies. Neurological: Positive for weakness. Negative for syncope and headaches. Hematological: Negative for adenopathy. Does not bruise/bleed easily. Psychiatric/Behavioral: Negative for dysphoric mood. The patient is nervous/anxious. All other systems reviewed and are negative. Vitals:  
 05/09/19 1727 05/09/19 1745 05/09/19 1802 05/09/19 1816 BP: 97/69 101/60 103/64 93/58 Pulse: (!) 132 (!) 124 (!) 123 (!) 121 Resp: 16  26 22 Temp: 98.7 °F (37.1 °C) SpO2: 96% 98% 99% 98% Weight: 77.1 kg (170 lb) Height: 5' 3\" (1.6 m) Physical Exam  
Constitutional: She is oriented to person, place, and time. She appears well-developed and well-nourished. She appears distressed. HENT:  
Head: Normocephalic and atraumatic. Right Ear: External ear normal.  
Left Ear: External ear normal.  
Mouth/Throat: Oropharynx is clear and moist. No oropharyngeal exudate. Poor dentition Eyes: Pupils are equal, round, and reactive to light. Conjunctivae and EOM are normal.  
Neck: Normal range of motion. Neck supple. No JVD present. Cardiovascular: Regular rhythm, normal heart sounds and intact distal pulses. Tachycardia present. Exam reveals no gallop and no friction rub. No murmur heard. Pulmonary/Chest: Effort normal and breath sounds normal. Tachypnea noted. Abdominal: Soft. Normal appearance and bowel sounds are normal. She exhibits no distension and no mass. There is no hepatosplenomegaly. There is no tenderness. Musculoskeletal: Normal range of motion. She exhibits no edema or deformity. Right lower leg: She exhibits no edema. Left lower leg: She exhibits no edema. No localizing areas of tenderness to palpation Neurological: She is alert and oriented to person, place, and time. She has normal strength. No cranial nerve deficit or sensory deficit. She displays a negative Romberg sign. Gait normal.  
Skin: Skin is warm and dry. Capillary refill takes less than 2 seconds. No rash noted. Psychiatric: She has a normal mood and affect. Her speech is normal and behavior is normal. Judgment and thought content normal. Cognition and memory are normal.  
Nursing note and vitals reviewed. MDM Number of Diagnoses or Management Options IV drug abuse ([facility] [address]): established and worsening Severe sepsis [facility]): new and requires workup Urinary tract infection without hematuria, site unspecified: new and requires workup Diagnosis management comments: 58-year-old female with a history of IV drug abuse 2 day history of diffuse myalgias, chest pain or shortness of breath Recent past medical history positive for an upper extremity DVT and small peripheral PE. States that she is currently not taking anticoagulation or other prescription medications We will hydrate the patient. Initial white count and lactic acid are both elevated. We will start empiric antibiotics We'll also give her dose of the relative as she is currently symptomatic 
 
8:50 PM 
Patient's lactic acid has improved to 2.17. Overall, her tachycardia has decreased as well after IV fluids Patient's blood pressure has waxed and waned from systolics in the 93P to 611Z Patient seen here and her tract infection may be the sole source of infection at this point and she qualifies a severe sepsis. I do believe that her lifestyle habits, specifically her IV drug abuse, have left her dehydrated, which accounts for her slight elevation in her renal function numbers. Patient will be admitted to the hospitalist service for continued IV antibiotics, monitoring culture results and further care Amount and/or Complexity of Data Reviewed Clinical lab tests: ordered and reviewed Tests in the radiology section of CPT®: ordered and reviewed Tests in the medicine section of CPT®: ordered and reviewed Review and summarize past medical records: yes Discuss the patient with other providers: yes Independent visualization of images, tracings, or specimens: yes Risk of Complications, Morbidity, and/or Mortality Presenting problems: high Diagnostic procedures: high Management options: high General comments: Elements of this note have been dictated via voice recognition software. Text and phrases may be limited by the accuracy of the software. The chart has been reviewed, but errors may still be present. Critical Care Total time providing critical care: 30-74 minutes (65) Patient Progress Patient progress: improved Procedures

## 2019-05-09 NOTE — ED TRIAGE NOTES
Pt reports bilateral shoulder pain, back pain, and chest pain for 3 days. Pt loudly moaning in triage. States she has been coughing up mucous as well. States dark urine. Hx of blood clots in lungs. Stopped taking xarelto

## 2019-05-10 LAB
ANION GAP SERPL CALC-SCNC: 10 MMOL/L (ref 7–16)
BASOPHILS # BLD: 0.1 K/UL (ref 0–0.2)
BASOPHILS NFR BLD: 0 % (ref 0–2)
BUN SERPL-MCNC: 17 MG/DL (ref 6–23)
CALCIUM SERPL-MCNC: 7.6 MG/DL (ref 8.3–10.4)
CHLORIDE SERPL-SCNC: 109 MMOL/L (ref 98–107)
CO2 SERPL-SCNC: 21 MMOL/L (ref 21–32)
CREAT SERPL-MCNC: 0.66 MG/DL (ref 0.6–1)
DIFFERENTIAL METHOD BLD: ABNORMAL
EOSINOPHIL # BLD: 0 K/UL (ref 0–0.8)
EOSINOPHIL NFR BLD: 0 % (ref 0.5–7.8)
ERYTHROCYTE [DISTWIDTH] IN BLOOD BY AUTOMATED COUNT: 15.5 % (ref 11.9–14.6)
GLUCOSE SERPL-MCNC: 128 MG/DL (ref 65–100)
HCT VFR BLD AUTO: 29.7 % (ref 35.8–46.3)
HGB BLD-MCNC: 10.2 G/DL (ref 11.7–15.4)
IMM GRANULOCYTES # BLD AUTO: 0.5 K/UL (ref 0–0.5)
IMM GRANULOCYTES NFR BLD AUTO: 2 % (ref 0–5)
LYMPHOCYTES # BLD: 1.4 K/UL (ref 0.5–4.6)
LYMPHOCYTES NFR BLD: 6 % (ref 13–44)
MCH RBC QN AUTO: 27.9 PG (ref 26.1–32.9)
MCHC RBC AUTO-ENTMCNC: 34.3 G/DL (ref 31.4–35)
MCV RBC AUTO: 81.1 FL (ref 79.6–97.8)
MONOCYTES # BLD: 1.1 K/UL (ref 0.1–1.3)
MONOCYTES NFR BLD: 5 % (ref 4–12)
NEUTS SEG # BLD: 19.5 K/UL (ref 1.7–8.2)
NEUTS SEG NFR BLD: 86 % (ref 43–78)
NRBC # BLD: 0 K/UL (ref 0–0.2)
PLATELET # BLD AUTO: 160 K/UL (ref 150–450)
PMV BLD AUTO: 12 FL (ref 9.4–12.3)
POTASSIUM SERPL-SCNC: 3.2 MMOL/L (ref 3.5–5.1)
RBC # BLD AUTO: 3.66 M/UL (ref 4.05–5.2)
SODIUM SERPL-SCNC: 140 MMOL/L (ref 136–145)
WBC # BLD AUTO: 22.6 K/UL (ref 4.3–11.1)

## 2019-05-10 PROCEDURE — 36415 COLL VENOUS BLD VENIPUNCTURE: CPT

## 2019-05-10 PROCEDURE — 74011250637 HC RX REV CODE- 250/637: Performed by: HOSPITALIST

## 2019-05-10 PROCEDURE — 74011000258 HC RX REV CODE- 258: Performed by: HOSPITALIST

## 2019-05-10 PROCEDURE — 74011250636 HC RX REV CODE- 250/636: Performed by: HOSPITALIST

## 2019-05-10 PROCEDURE — 80048 BASIC METABOLIC PNL TOTAL CA: CPT

## 2019-05-10 PROCEDURE — 74011250637 HC RX REV CODE- 250/637: Performed by: FAMILY MEDICINE

## 2019-05-10 PROCEDURE — 74011250636 HC RX REV CODE- 250/636: Performed by: FAMILY MEDICINE

## 2019-05-10 PROCEDURE — 65270000029 HC RM PRIVATE

## 2019-05-10 PROCEDURE — 85025 COMPLETE CBC W/AUTO DIFF WBC: CPT

## 2019-05-10 PROCEDURE — 77030020263 HC SOL INJ SOD CL0.9% LFCR 1000ML

## 2019-05-10 RX ORDER — POTASSIUM CHLORIDE 20 MEQ/1
40 TABLET, EXTENDED RELEASE ORAL
Status: ACTIVE | OUTPATIENT
Start: 2019-05-10 | End: 2019-05-10

## 2019-05-10 RX ADMIN — SODIUM CHLORIDE 150 ML/HR: 900 INJECTION, SOLUTION INTRAVENOUS at 11:33

## 2019-05-10 RX ADMIN — Medication 1 AMPULE: at 08:08

## 2019-05-10 RX ADMIN — Medication 1 AMPULE: at 21:50

## 2019-05-10 RX ADMIN — CEFTRIAXONE 2 G: 2 INJECTION, POWDER, FOR SOLUTION INTRAMUSCULAR; INTRAVENOUS at 22:48

## 2019-05-10 RX ADMIN — Medication 10 ML: at 21:50

## 2019-05-10 RX ADMIN — TRAMADOL HYDROCHLORIDE 50 MG: 50 TABLET, FILM COATED ORAL at 05:49

## 2019-05-10 RX ADMIN — RIVAROXABAN 15 MG: 15 TABLET, FILM COATED ORAL at 08:08

## 2019-05-10 RX ADMIN — Medication 10 ML: at 05:42

## 2019-05-10 RX ADMIN — URINARY PAIN RELIEF 190 MG: 95 TABLET ORAL at 05:43

## 2019-05-10 RX ADMIN — MORPHINE SULFATE 2 MG: 2 INJECTION, SOLUTION INTRAMUSCULAR; INTRAVENOUS at 02:05

## 2019-05-10 RX ADMIN — URINARY PAIN RELIEF 190 MG: 95 TABLET ORAL at 21:50

## 2019-05-10 RX ADMIN — RIVAROXABAN 15 MG: 15 TABLET, FILM COATED ORAL at 20:00

## 2019-05-10 RX ADMIN — Medication 10 ML: at 11:35

## 2019-05-10 RX ADMIN — URINARY PAIN RELIEF 190 MG: 95 TABLET ORAL at 14:07

## 2019-05-10 RX ADMIN — MORPHINE SULFATE 2 MG: 2 INJECTION, SOLUTION INTRAMUSCULAR; INTRAVENOUS at 08:09

## 2019-05-10 RX ADMIN — ACETAMINOPHEN 650 MG: 325 TABLET, FILM COATED ORAL at 22:48

## 2019-05-10 NOTE — H&P
HOSPITALIST INITIAL HISTORY AND PHYSICAL 
NAME:  Adrian Gaffney Age:  26 y.o. 
:   1985 MRN:   [id number] PCP: None Consulting MD: Treatment Team: Attending Provider: Dal Halsted, MD 
 
CHIEF COMPLAINT: chills, body aches HISTORY OF PRESENT ILLNESS:  
Adrian Gaffney is a 26 y.o. female with a past medical history of IV drug use who presents to the ER with report of 3 days of feeling chills and body aches all over. She denies any shana fevers, nausea, vomiting, dysuria, cough, congestion. REVIEW OF SYSTEMS: Comprehensive ROS performed and negative except as stated in HPI. History reviewed. No pertinent past medical history. Past Surgical History:  
Procedure Laterality Date  HX  SECTION    
 HX GYN    
 HX ORTHOPAEDIC  2018  
 left arm  HX TUBAL LIGATION Bilateral   
 
 
Prior to Admission Medications Prescriptions Last Dose Informant Patient Reported? Taking? rivaroxaban (XARELTO) 15 mg (42)- 20 mg (9) DsPk Not Taking at Unknown time  No No  
Sig: Take one 15 mg tablet twice a day with food for the first 21 days. Then, take one 20 mg tablet once a day with food for 9 days. Facility-Administered Medications: None No Known Allergies FAMILY HISTORY: Reviewed. Negative except History reviewed. No pertinent family history. Social History Tobacco Use  Smoking status: Never Smoker  Smokeless tobacco: Never Used Substance Use Topics  Alcohol use: No  
  Frequency: Never Objective:  
 
Visit Vitals BP 99/64 (BP 1 Location: Left arm, BP Patient Position: At rest;Post activity) Pulse (!) 120 Temp 100.1 °F (37.8 °C) Resp 22 Ht 5' 3\" (1.6 m) Wt 77.1 kg (170 lb) SpO2 99% Breastfeeding? Yes  
BMI 30.11 kg/m² Temp (24hrs), Av.5 °F (37.5 °C), Min:98.7 °F (37.1 °C), Max:100.1 °F (37.8 °C) Oxygen Therapy O2 Sat (%): 99 % (19) Pulse via Oximetry: 119 beats per minute (19) O2 Device: Room air (05/09/19 2121) Physical Exam: 
General:    The patient is a young female in obvious discomfort. Head:   Normocephalic/atraumatic. Eyes:  No palpebral pallor or scleral icterus. ENT:  External auricular and nasal exam within normal limits. Mucous membranes are moist. 
Neck:  Supple, non-tender, no JVD. Lungs:   Clear to auscultation bilaterally without wheezes or crackles. No respiratory distress or accessory muscle use. Heart:   Regular rate and rhythm, without murmurs, rubs, or gallops. Abdomen:   Soft, non-tender, non-distended with normoactive bowel sounds. Genitourinary: No tenderness over the bladder or bilateral CVAs. Extremities: Without clubbing, cyanosis, or edema. Skin:     Normal color, texture, and turgor. No rashes, lesions, or jaundice. Pulses: Radial and dorsalis pedis pulses present 2+ bilaterally. Capillary refill <2s. Neurologic: CN II-XII grossly intact and symmetrical.  
  Moving all four extremities well with no focal deficits. Psychiatric: Pleasant demeanor, appropriate affect. Alert and oriented x 3 Data Review:  
Recent Results (from the past 24 hour(s)) EKG, 12 LEAD, INITIAL Collection Time: 05/09/19  5:19 PM  
Result Value Ref Range Ventricular Rate 132 BPM  
 Atrial Rate 132 BPM  
 P-R Interval 120 ms QRS Duration 80 ms  
 Q-T Interval 394 ms QTC Calculation (Bezet) 583 ms Calculated P Axis 68 degrees Calculated R Axis 80 degrees Calculated T Axis 47 degrees Diagnosis Sinus tachycardia Nonspecific ST and T wave abnormality Abnormal ECG When compared with ECG of 01-FEB-2019 01:51, 
ST now depressed in Lateral leads Nonspecific T wave abnormality now evident in Inferior leads Nonspecific T wave abnormality now evident in Lateral leads Confirmed by Justin Carter ([id number]) on 5/9/2019 6:54:26 PM 
  
CBC WITH AUTOMATED DIFF Collection Time: 05/09/19  5:45 PM  
Result Value Ref Range WBC 20.6 (H) 4.3 - 11.1 K/uL  
 RBC 5.04 4.05 - 5.2 M/uL  
 HGB 14.3 11.7 - 15.4 g/dL HCT 41.1 35.8 - 46.3 % MCV 81.5 79.6 - 97.8 FL  
 MCH 28.4 26.1 - 32.9 PG  
 MCHC 34.8 31.4 - 35.0 g/dL  
 RDW 15.2 (H) 11.9 - 14.6 % PLATELET 385 677 - 244 K/uL MPV 12.5 (H) 9.4 - 12.3 FL ABSOLUTE NRBC 0.00 0.0 - 0.2 K/uL NEUTROPHILS 85 (H) 43 - 78 % LYMPHOCYTES 6 (L) 13 - 44 % MONOCYTES 3 (L) 4.0 - 12.0 % EOSINOPHILS 0 (L) 0.5 - 7.8 % BASOPHILS 1 0.0 - 2.0 % IMMATURE GRANULOCYTES 5 0.0 - 5.0 %  
 ABS. NEUTROPHILS 17.6 (H) 1.7 - 8.2 K/UL  
 ABS. LYMPHOCYTES 1.2 0.5 - 4.6 K/UL  
 ABS. MONOCYTES 0.6 0.1 - 1.3 K/UL  
 ABS. EOSINOPHILS 0.0 0.0 - 0.8 K/UL  
 ABS. BASOPHILS 0.2 0.0 - 0.2 K/UL  
 ABS. IMM. GRANS. 1.0 (H) 0.0 - 0.5 K/UL  
 RBC COMMENTS NORMOCYTIC/NORMOCHROMIC    
 WBC COMMENTS TOXIC GRANULATION    
 PLATELET COMMENTS ADEQUATE    
 DF AUTOMATED METABOLIC PANEL, COMPREHENSIVE Collection Time: 05/09/19  5:45 PM  
Result Value Ref Range Sodium 129 (L) 136 - 145 mmol/L Potassium 2.9 (LL) 3.5 - 5.1 mmol/L Chloride 91 (L) 98 - 107 mmol/L  
 CO2 25 21 - 32 mmol/L Anion gap 13 7 - 16 mmol/L Glucose 124 (H) 65 - 100 mg/dL BUN 33 (H) 6 - 23 MG/DL Creatinine 1.44 (H) 0.6 - 1.0 MG/DL  
 GFR est AA 53 (L) >60 ml/min/1.73m2 GFR est non-AA 44 (L) >60 ml/min/1.73m2 Calcium 9.2 8.3 - 10.4 MG/DL Bilirubin, total 0.5 0.2 - 1.1 MG/DL  
 ALT (SGPT) 62 12 - 65 U/L  
 AST (SGOT) 25 15 - 37 U/L Alk. phosphatase 198 (H) 50 - 136 U/L Protein, total 8.2 6.3 - 8.2 g/dL Albumin 2.6 (L) 3.5 - 5.0 g/dL Globulin 5.6 (H) 2.3 - 3.5 g/dL A-G Ratio 0.5 (L) 1.2 - 3.5    
TROPONIN I Collection Time: 05/09/19  5:45 PM  
Result Value Ref Range Troponin-I, Qt. <0.02 (L) 0.02 - 0.05 NG/ML  
MAGNESIUM Collection Time: 05/09/19  5:45 PM  
Result Value Ref Range Magnesium 2.4 1.8 - 2.4 mg/dL CK Collection Time: 05/09/19  5:45 PM  
Result Value Ref Range CK 40 21 - 215 U/L  
ETHYL ALCOHOL Collection Time: 05/09/19  5:45 PM  
Result Value Ref Range ALCOHOL(ETHYL),SERUM <3 MG/DL POC LACTIC ACID Collection Time: 05/09/19  5:46 PM  
Result Value Ref Range Lactic Acid (POC) 3.80 (H) 0.5 - 1.9 mmol/L  
POC G3 Collection Time: 05/09/19  6:32 PM  
Result Value Ref Range Device: ROOM AIR    
 FIO2 (POC) 21 % pH (POC) 7.430 7.35 - 7.45    
 pCO2 (POC) 30.7 (L) 35 - 45 MMHG  
 pO2 (POC) 72 (L) 75 - 100 MMHG  
 HCO3 (POC) 20.4 (L) 22 - 26 MMOL/L  
 sO2 (POC) 95 95 - 98 % Base deficit (POC) 3 mmol/L Allens test (POC) NOT APPLICABLE Site RIGHT BRACHIAL Patient temp. 98.6 Specimen type (POC) ARTERIAL Performed by Horace   
 CO2, POC 21 MMOL/L  
 COLLECT TIME 1,815 POC LACTIC ACID Collection Time: 05/09/19  7:09 PM  
Result Value Ref Range Lactic Acid (POC) 2.17 (H) 0.5 - 1.9 mmol/L  
HCG URINE, QL. - POC Collection Time: 05/09/19  8:26 PM  
Result Value Ref Range Pregnancy test,urine (POC) NEGATIVE  NEG    
DRUG SCREEN, URINE Collection Time: 05/09/19  8:27 PM  
Result Value Ref Range PCP(PHENCYCLIDINE) NEGATIVE BENZODIAZEPINES NEGATIVE     
 COCAINE NEGATIVE AMPHETAMINES POSITIVE METHADONE NEGATIVE     
 THC (TH-CANNABINOL) NEGATIVE     
 OPIATES POSITIVE    
 BARBITURATES NEGATIVE URINE MICROSCOPIC Collection Time: 05/09/19  8:27 PM  
Result Value Ref Range WBC 20-50 0 /hpf  
 RBC 0-3 0 /hpf Epithelial cells 0 0 /hpf Bacteria 2+ (H) 0 /hpf Casts 5-10 0 /lpf Imaging Lee Douglas /Studies: No results found. Assessment and Plan:  
 
Principal Problem: 
  Severe sepsis ([address] (5/9/2019) Aggressive IVF at 30mL/kg. IV antibiotics per below. Trending lactic acid. Active Problems: 
  UTI (urinary tract infection) (5/9/2019) IV Vanc, Zosyn given severity of symptoms. Blood/urine cultures pending. Hypokalemia (5/9/2019) Replace IV. Follow BMP. History of pulmonary embolism (5/9/2019) Not taking Xarelto as prescribed. Restart. IVDU (intravenous drug user) (2/1/2019) UDS pending. DVT Prophylaxis: Xarelto Code Status: FULL CODE Disposition: Admit to med/surg for evaluation and treatment as per above. Anticipated discharge: 2-3 days Signed By: Ese Esteves MD   
 May 9, 2019

## 2019-05-10 NOTE — ROUTINE PROCESS
TRANSFER - OUT REPORT: 
 
Verbal report given to Tonia London Rn on Deane Bosworth  being transferred to [address] [place] [address] for routine progression of care Report consisted of patients Situation, Background, Assessment and  
Recommendations(SBAR). Information from the following report(s) SBAR, ED Summary, [facility] and Recent Results was reviewed with the receiving nurse. Lines:  
Peripheral IV 05/09/19 Right Forearm (Active) Site Assessment Clean, dry, & intact 5/9/2019  5:46 PM  
Phlebitis Assessment 0 5/9/2019  5:46 PM  
Infiltration Assessment 0 5/9/2019  5:46 PM  
Dressing Status Clean, dry, & intact 5/9/2019  5:46 PM  
Hub Color/Line Status Blue 5/9/2019  5:46 PM  
   
Peripheral IV 05/09/19 Right;Upper Arm (Active) Site Assessment Clean, dry, & intact 5/9/2019  8:47 PM  
Phlebitis Assessment 0 5/9/2019  8:47 PM  
Infiltration Assessment 0 5/9/2019  8:47 PM  
Dressing Status Clean, dry, & intact 5/9/2019  8:47 PM  
  
 
Opportunity for questions and clarification was provided. Patient transported with: 
 [employer]

## 2019-05-10 NOTE — PROGRESS NOTES
Upon entering room, patient yelling out. Assistant at bedside, taking vitals. Assessment completed and documented; see docflow. Patient in NAD. Patient A/Ox3. IVF infusing without difficulty. Call light within reach. Will continue to monitor.

## 2019-05-10 NOTE — PROGRESS NOTES
Patient refused PO Potassium. C/O pain. Informed patient that pain medications were discontinued by Dr.  
Patient with c/o that Dr did not come by to talk to her prior to d/c meds. Attempted to explain that Dr Michell Dietz did in fact come in her room to speak to her, but she was asleep. Patient extremely rude to RN, stating \"get out of my room\".

## 2019-05-10 NOTE — PROGRESS NOTES
TRANSFER - IN REPORT: 
 
Verbal report received from Glade on Jose Alberto Mortensen  being received from ED for routine progression of care Report consisted of patients Situation, Background, Assessment and  
Recommendations(SBAR). Information from the following report(s) Kardex was reviewed with the receiving nurse. Opportunity for questions and clarification was provided. Assessment completed upon patients arrival to unit and care assumed.

## 2019-05-10 NOTE — PROGRESS NOTES
Physical assessment completed, patient alert and oriented, cooperative with care, but with anxiety, no visual S/S of pain or distress, dual skin assessment done with Garima Gardner RN a dressing noted over right side of her neck, two tattoos noted on each arm, no other skin issues noted at this time.

## 2019-05-10 NOTE — PROGRESS NOTES
Pt lying in bed, on room air. Pt is alert and oriented x4. Pt c/o pain in her arms and states she \"can not move them\". Pt visibly moving arms. Pt states she has chronic pain and self medicates with recreational drugs. Pt has IVF running, no abnormalities noted at this time. Pt instructed to call for assistance, call light in reach.

## 2019-05-10 NOTE — PROGRESS NOTES
Patient requesting pain medication, Md Arguello Prudent paged and notified. Md oneill orders \"give Tramadol 50 mg PO  for pain\".

## 2019-05-10 NOTE — PROGRESS NOTES
Patient potassium level below normal average and temperature above normal level. Md Keely Milton paged and notified about this patient status. Md Keely Milton orders \" give 60 mEq PO potassium chloride stat and give 650 mg of Tylenol PO to this patient\".

## 2019-05-10 NOTE — PROGRESS NOTES
Patient admitted overnight with severe sepsis and a UTI. She is uninsured and has no PCP. We will refer patient to [facility] at discharge for follow-up care. Case Management will continue to follow. Care Management Interventions PCP Verified by CM: No 
Transition of Care Consult (CM Consult): Discharge Planning Discharge Durable Medical Equipment: No 
Physical Therapy Consult: No 
Occupational Therapy Consult: No 
Speech Therapy Consult: No 
Current Support Network: Own Home Confirm Follow Up Transport: Self Plan discussed with Pt/Family/Caregiver: Yes Freedom of Choice Offered: Yes Discharge Location Discharge Placement: Home

## 2019-05-10 NOTE — PROGRESS NOTES
Problem: Falls - Risk of 
Goal: *Absence of Falls Description Document Adelita Duncan Fall Risk and appropriate interventions in the flowsheet. Outcome: Progressing Towards Goal 
  
Problem: Patient Education: Go to Patient Education Activity Goal: Patient/Family Education Outcome: Progressing Towards Goal 
  
Problem: Patient Education: Go to Patient Education Activity Goal: Patient/Family Education Outcome: Progressing Towards Goal 
  
Problem: Sepsis: Day of Diagnosis Goal: Off Pathway (Use only if patient is Off Pathway) Outcome: Progressing Towards Goal 
Goal: *Fluid resuscitation Outcome: Progressing Towards Goal 
Goal: *Paired blood cultures prior to first dose of antibiotic Outcome: Progressing Towards Goal 
Goal: *First dose of  appropriate antibiotic within 3 hours of arrival to ED, within 1 hour of arrival to ICU Outcome: Progressing Towards Goal 
Goal: *Lactic acid with first set of blood cultures Outcome: Progressing Towards Goal 
Goal: *Pneumococcal immunization (if eligible) Outcome: Progressing Towards Goal 
Goal: *Influenza immunization (if eligible) Outcome: Progressing Towards Goal 
Goal: Activity/Safety Outcome: Progressing Towards Goal 
Goal: Consults, if ordered Outcome: Progressing Towards Goal 
Goal: Diagnostic Test/Procedures Outcome: Progressing Towards Goal 
Goal: Nutrition/Diet Outcome: Progressing Towards Goal 
Goal: Discharge Planning Outcome: Progressing Towards Goal 
Goal: Medications Outcome: Progressing Towards Goal 
Goal: Respiratory Outcome: Progressing Towards Goal 
Goal: Treatments/Interventions/Procedures Outcome: Progressing Towards Goal 
Goal: Psychosocial 
Outcome: Progressing Towards Goal 
  
Problem: Sepsis: Day 2 Goal: Off Pathway (Use only if patient is Off Pathway) Outcome: Progressing Towards Goal 
Goal: *Central Venous Pressure maintained at 8-12 mm Hg Outcome: Progressing Towards Goal 
Goal: *Hemodynamically stable Outcome: Progressing Towards Goal 
Goal: *Tolerating diet Outcome: Progressing Towards Goal 
Goal: Activity/Safety Outcome: Progressing Towards Goal 
Goal: Consults, if ordered Outcome: Progressing Towards Goal 
Goal: Diagnostic Test/Procedures Outcome: Progressing Towards Goal 
Goal: Nutrition/Diet Outcome: Progressing Towards Goal 
Goal: Discharge Planning Outcome: Progressing Towards Goal 
Goal: Medications Outcome: Progressing Towards Goal 
Goal: Respiratory Outcome: Progressing Towards Goal 
Goal: Treatments/Interventions/Procedures Outcome: Progressing Towards Goal 
Goal: Psychosocial 
Outcome: Progressing Towards Goal 
  
Problem: Sepsis: Day 3 Goal: Off Pathway (Use only if patient is Off Pathway) Outcome: Progressing Towards Goal 
Goal: *Central Venous Pressure maintained at 8-12 mm Hg Outcome: Progressing Towards Goal 
Goal: *Oxygen saturation within defined limits Outcome: Progressing Towards Goal 
Goal: *Vital sign stability Outcome: Progressing Towards Goal 
Goal: *Tolerating diet Outcome: Progressing Towards Goal 
Goal: *Demonstrates progressive activity Outcome: Progressing Towards Goal 
Goal: Activity/Safety Outcome: Progressing Towards Goal 
Goal: Consults, if ordered Outcome: Progressing Towards Goal 
Goal: Diagnostic Test/Procedures Outcome: Progressing Towards Goal 
Goal: Nutrition/Diet Outcome: Progressing Towards Goal 
Goal: Discharge Planning Outcome: Progressing Towards Goal 
Goal: Medications Outcome: Progressing Towards Goal 
Goal: Respiratory Outcome: Progressing Towards Goal 
Goal: Treatments/Interventions/Procedures Outcome: Progressing Towards Goal 
Goal: Psychosocial 
Outcome: Progressing Towards Goal 
  
Problem: Sepsis: Day 4 Goal: Off Pathway (Use only if patient is Off Pathway) Outcome: Progressing Towards Goal 
Goal: Activity/Safety Outcome: Progressing Towards Goal 
Goal: Consults, if ordered Outcome: Progressing Towards Goal 
 Goal: Diagnostic Test/Procedures Outcome: Progressing Towards Goal 
Goal: Nutrition/Diet Outcome: Progressing Towards Goal 
Goal: Discharge Planning Outcome: Progressing Towards Goal 
Goal: Medications Outcome: Progressing Towards Goal 
Goal: Respiratory Outcome: Progressing Towards Goal 
Goal: Treatments/Interventions/Procedures Outcome: Progressing Towards Goal 
Goal: Psychosocial 
Outcome: Progressing Towards Goal 
Goal: *Oxygen saturation within defined limits Outcome: Progressing Towards Goal 
Goal: *Hemodynamically stable Outcome: Progressing Towards Goal 
Goal: *Vital signs within defined limits Outcome: Progressing Towards Goal 
Goal: *Tolerating diet Outcome: Progressing Towards Goal 
Goal: *Demonstrates progressive activity Outcome: Progressing Towards Goal 
Goal: *Fluid volume maintenance Outcome: Progressing Towards Goal 
  
Problem: Sepsis: Day 5 Goal: Off Pathway (Use only if patient is Off Pathway) Outcome: Progressing Towards Goal 
Goal: *Oxygen saturation within defined limits Outcome: Progressing Towards Goal 
Goal: *Vital signs within defined limits Outcome: Progressing Towards Goal 
Goal: *Tolerating diet Outcome: Progressing Towards Goal 
Goal: *Demonstrates progressive activity Outcome: Progressing Towards Goal 
Goal: *Discharge plan identified Outcome: Progressing Towards Goal 
Goal: Activity/Safety Outcome: Progressing Towards Goal 
Goal: Consults, if ordered Outcome: Progressing Towards Goal 
Goal: Diagnostic Test/Procedures Outcome: Progressing Towards Goal 
Goal: Nutrition/Diet Outcome: Progressing Towards Goal 
Goal: Discharge Planning Outcome: Progressing Towards Goal 
Goal: Medications Outcome: Progressing Towards Goal 
Goal: Respiratory Outcome: Progressing Towards Goal 
Goal: Treatments/Interventions/Procedures Outcome: Progressing Towards Goal 
Goal: Psychosocial 
Outcome: Progressing Towards Goal 
  
Problem: Sepsis: Day 6 
 Goal: Off Pathway (Use only if patient is Off Pathway) Outcome: Progressing Towards Goal 
Goal: *Oxygen saturation within defined limits Outcome: Progressing Towards Goal 
Goal: *Vital signs within defined limits Outcome: Progressing Towards Goal 
Goal: *Tolerating diet Outcome: Progressing Towards Goal 
Goal: *Demonstrates progressive activity Outcome: Progressing Towards Goal 
Goal: Influenza immunization Outcome: Progressing Towards Goal 
Goal: *Pneumococcal immunization Outcome: Progressing Towards Goal 
Goal: Activity/Safety Outcome: Progressing Towards Goal 
Goal: Diagnostic Test/Procedures Outcome: Progressing Towards Goal 
Goal: Nutrition/Diet Outcome: Progressing Towards Goal 
Goal: Discharge Planning Outcome: Progressing Towards Goal 
Goal: Medications Outcome: Progressing Towards Goal 
Goal: Respiratory Outcome: Progressing Towards Goal 
Goal: Treatments/Interventions/Procedures Outcome: Progressing Towards Goal 
Goal: Psychosocial 
Outcome: Progressing Towards Goal 
  
Problem: Sepsis: Discharge Outcomes Goal: *Vital signs within defined limits Outcome: Progressing Towards Goal 
Goal: *Tolerating diet Outcome: Progressing Towards Goal 
Goal: *Verbalizes understanding and describes prescribed diet Outcome: Progressing Towards Goal 
Goal: *Demonstrates progressive activity Outcome: Progressing Towards Goal 
Goal: *Describes follow-up/return visits to physicians Outcome: Progressing Towards Goal 
Goal: *Verbalizes name, dosage, time, side effects, and number of days to continue medications Outcome: Progressing Towards Goal 
Goal: *Influenza immunization (Oct-Mar only) Outcome: Progressing Towards Goal 
Goal: *Pneumococcal immunization Outcome: Progressing Towards Goal 
Goal: *Lungs clear or at baseline Outcome: Progressing Towards Goal 
Goal: *Oxygen saturation returns to baseline or 90% or better on room air Outcome: Progressing Towards Goal 
Goal: *Glycemic control Outcome: Progressing Towards Goal 
Goal: *Absence of deep venous thrombosis signs and symptoms(Stroke Metric) Outcome: Progressing Towards Goal 
Goal: *Describes available resources and support systems Outcome: Progressing Towards Goal 
Goal: *Optimal pain control at patient's stated goal 
Outcome: Progressing Towards Goal

## 2019-05-10 NOTE — PROGRESS NOTES
HOSPITALIST 
NAME:  Mary Ohara Age:  26 y.o. 
:   1985 MRN:   [id number] PCP: None Consulting MD: Treatment Team: Attending Provider: Linda Rivera MD; Care Manager: Shirley Arroyo RN; Utilization Review: Allyn Aldridge RN 
 
CHIEF COMPLAINT: chills, body aches 
 
subj  
 
a 26 y.o. female with a past medical history of IV drug use who presents to the ER with report of 3 days of feeling chills and body aches all over. She denies any shana fevers, nausea, vomiting, dysuria, cough, congestion. Today, no fever this am, no ac events Objective:  
 
Visit Vitals /67 Pulse (!) 102 Temp 98.1 °F (36.7 °C) Resp 18 Ht 5' 3\" (1.6 m) Wt 77.1 kg (170 lb) SpO2 98% Breastfeeding? Yes  
BMI 30.11 kg/m² Temp (24hrs), Av.9 °F (37.2 °C), Min:97.9 °F (36.6 °C), Max:100.1 °F (37.8 °C) Oxygen Therapy O2 Sat (%): 98 % (05/10/19 1453) Pulse via Oximetry: 119 beats per minute (19) O2 Device: Room air (19) Physical Exam: 
General:    AAO, sleepy Lungs:   Clear to auscultation bilaterally Heart:   Regular rate and rhythm, without murmurs, rubs, or gallops. Abdomen:   Soft, non-tender, non-distended with normoactive bowel sounds. Genitourinary: No tenderness over the bladder or bilateral CVAs. Extremities: Without clubbing, cyanosis, or edema. Skin:     Normal color, texture, and turgor. No rashes, lesions, or jaundice. Pulses: Radial and dorsalis pedis pulses present 2+ bilaterally. Capillary refill <2s. Neurologic: grossly intact Data Review:  
Recent Results (from the past 24 hour(s)) EKG, 12 LEAD, INITIAL Collection Time: 19  5:19 PM  
Result Value Ref Range Ventricular Rate 132 BPM  
 Atrial Rate 132 BPM  
 P-R Interval 120 ms QRS Duration 80 ms  
 Q-T Interval 394 ms QTC Calculation (Bezet) 583 ms Calculated P Axis 68 degrees Calculated R Axis 80 degrees Calculated T Axis 47 degrees Diagnosis Sinus tachycardia Nonspecific ST and T wave abnormality Abnormal ECG When compared with ECG of 01-FEB-2019 01:51, 
ST now depressed in Lateral leads Nonspecific T wave abnormality now evident in Inferior leads Nonspecific T wave abnormality now evident in Lateral leads Confirmed by Ana Fulton ([id number]) on 5/9/2019 6:54:26 PM 
  
CBC WITH AUTOMATED DIFF Collection Time: 05/09/19  5:45 PM  
Result Value Ref Range WBC 20.6 (H) 4.3 - 11.1 K/uL  
 RBC 5.04 4.05 - 5.2 M/uL  
 HGB 14.3 11.7 - 15.4 g/dL HCT 41.1 35.8 - 46.3 % MCV 81.5 79.6 - 97.8 FL  
 MCH 28.4 26.1 - 32.9 PG  
 MCHC 34.8 31.4 - 35.0 g/dL  
 RDW 15.2 (H) 11.9 - 14.6 % PLATELET 706 553 - 111 K/uL MPV 12.5 (H) 9.4 - 12.3 FL ABSOLUTE NRBC 0.00 0.0 - 0.2 K/uL NEUTROPHILS 85 (H) 43 - 78 % LYMPHOCYTES 6 (L) 13 - 44 % MONOCYTES 3 (L) 4.0 - 12.0 % EOSINOPHILS 0 (L) 0.5 - 7.8 % BASOPHILS 1 0.0 - 2.0 % IMMATURE GRANULOCYTES 5 0.0 - 5.0 %  
 ABS. NEUTROPHILS 17.6 (H) 1.7 - 8.2 K/UL  
 ABS. LYMPHOCYTES 1.2 0.5 - 4.6 K/UL  
 ABS. MONOCYTES 0.6 0.1 - 1.3 K/UL  
 ABS. EOSINOPHILS 0.0 0.0 - 0.8 K/UL  
 ABS. BASOPHILS 0.2 0.0 - 0.2 K/UL  
 ABS. IMM. GRANS. 1.0 (H) 0.0 - 0.5 K/UL  
 RBC COMMENTS NORMOCYTIC/NORMOCHROMIC    
 WBC COMMENTS TOXIC GRANULATION    
 PLATELET COMMENTS ADEQUATE    
 DF AUTOMATED METABOLIC PANEL, COMPREHENSIVE Collection Time: 05/09/19  5:45 PM  
Result Value Ref Range Sodium 129 (L) 136 - 145 mmol/L Potassium 2.9 (LL) 3.5 - 5.1 mmol/L Chloride 91 (L) 98 - 107 mmol/L  
 CO2 25 21 - 32 mmol/L Anion gap 13 7 - 16 mmol/L Glucose 124 (H) 65 - 100 mg/dL BUN 33 (H) 6 - 23 MG/DL Creatinine 1.44 (H) 0.6 - 1.0 MG/DL  
 GFR est AA 53 (L) >60 ml/min/1.73m2 GFR est non-AA 44 (L) >60 ml/min/1.73m2 Calcium 9.2 8.3 - 10.4 MG/DL Bilirubin, total 0.5 0.2 - 1.1 MG/DL  
 ALT (SGPT) 62 12 - 65 U/L  
 AST (SGOT) 25 15 - 37 U/L Alk.  phosphatase 198 (H) 50 - 136 U/L  
 Protein, total 8.2 6.3 - 8.2 g/dL Albumin 2.6 (L) 3.5 - 5.0 g/dL Globulin 5.6 (H) 2.3 - 3.5 g/dL A-G Ratio 0.5 (L) 1.2 - 3.5    
TROPONIN I Collection Time: 05/09/19  5:45 PM  
Result Value Ref Range Troponin-I, Qt. <0.02 (L) 0.02 - 0.05 NG/ML  
MAGNESIUM Collection Time: 05/09/19  5:45 PM  
Result Value Ref Range Magnesium 2.4 1.8 - 2.4 mg/dL CK Collection Time: 05/09/19  5:45 PM  
Result Value Ref Range CK 40 21 - 215 U/L  
ETHYL ALCOHOL Collection Time: 05/09/19  5:45 PM  
Result Value Ref Range ALCOHOL(ETHYL),SERUM <3 MG/DL POC LACTIC ACID Collection Time: 05/09/19  5:46 PM  
Result Value Ref Range Lactic Acid (POC) 3.80 (H) 0.5 - 1.9 mmol/L  
CULTURE, BLOOD Collection Time: 05/09/19  6:22 PM  
Result Value Ref Range Special Requests: RIGHT 
FOREARM Culture result: NO GROWTH AFTER 11 HOURS    
POC G3 Collection Time: 05/09/19  6:32 PM  
Result Value Ref Range Device: ROOM AIR    
 FIO2 (POC) 21 % pH (POC) 7.430 7.35 - 7.45    
 pCO2 (POC) 30.7 (L) 35 - 45 MMHG  
 pO2 (POC) 72 (L) 75 - 100 MMHG  
 HCO3 (POC) 20.4 (L) 22 - 26 MMOL/L  
 sO2 (POC) 95 95 - 98 % Base deficit (POC) 3 mmol/L Allens test (POC) NOT APPLICABLE Site RIGHT BRACHIAL Patient temp. 98.6 Specimen type (POC) ARTERIAL [facility]   
 CO2, POC 21 MMOL/L  
 COLLECT TIME 1,815 CULTURE, BLOOD Collection Time: 05/09/19  7:03 PM  
Result Value Ref Range Special Requests: RIGHT Antecubital 
    
 GRAM STAIN GRAM POS COCCI IN CLUSTERS    
 GRAM STAIN AEROBIC AND ANAEROBIC BOTTLES    
 GRAM STAIN     
  RESULTS VERIFIED, PHONED TO AND READ BACK BY Marybel Barber, RN @ 0497 ON 5/10/19 BY ADS Culture result: CULTURE IN PROGRESS,FURTHER UPDATES TO FOLLOW Culture result: (A) MRSA target DNA sequences not detected: SA target DNA sequence detected within the sample. Test performed by PCR  Culture/Sensitivity to follow POC LACTIC ACID Collection Time: 05/09/19  7:09 PM  
Result Value Ref Range Lactic Acid (POC) 2.17 (H) 0.5 - 1.9 mmol/L  
HCG URINE, QL. - POC Collection Time: 05/09/19  8:26 PM  
Result Value Ref Range Pregnancy test,urine (POC) NEGATIVE  NEG    
DRUG SCREEN, URINE Collection Time: 05/09/19  8:27 PM  
Result Value Ref Range PCP(PHENCYCLIDINE) NEGATIVE BENZODIAZEPINES NEGATIVE     
 COCAINE NEGATIVE AMPHETAMINES POSITIVE METHADONE NEGATIVE     
 THC (TH-CANNABINOL) NEGATIVE     
 OPIATES POSITIVE    
 BARBITURATES NEGATIVE URINE MICROSCOPIC Collection Time: 05/09/19  8:27 PM  
Result Value Ref Range WBC 20-50 0 /hpf  
 RBC 0-3 0 /hpf Epithelial cells 0 0 /hpf Bacteria 2+ (H) 0 /hpf Casts 5-10 0 /lpf CULTURE, URINE Collection Time: 05/09/19  8:27 PM  
Result Value Ref Range Special Requests: NO SPECIAL REQUESTS Culture result:     
  NO GROWTH AFTER SHORT PERIOD OF INCUBATION. FURTHER RESULTS TO FOLLOW AFTER OVERNIGHT INCUBATION. CBC WITH AUTOMATED DIFF Collection Time: 05/10/19  8:31 AM  
Result Value Ref Range WBC 22.6 (H) 4.3 - 11.1 K/uL  
 RBC 3.66 (L) 4.05 - 5.2 M/uL  
 HGB 10.2 (L) 11.7 - 15.4 g/dL HCT 29.7 (L) 35.8 - 46.3 % MCV 81.1 79.6 - 97.8 FL  
 MCH 27.9 26.1 - 32.9 PG  
 MCHC 34.3 31.4 - 35.0 g/dL  
 RDW 15.5 (H) 11.9 - 14.6 % PLATELET 009 390 - 890 K/uL MPV 12.0 9.4 - 12.3 FL ABSOLUTE NRBC 0.00 0.0 - 0.2 K/uL  
 DF AUTOMATED NEUTROPHILS 86 (H) 43 - 78 % LYMPHOCYTES 6 (L) 13 - 44 % MONOCYTES 5 4.0 - 12.0 % EOSINOPHILS 0 (L) 0.5 - 7.8 % BASOPHILS 0 0.0 - 2.0 % IMMATURE GRANULOCYTES 2 0.0 - 5.0 %  
 ABS. NEUTROPHILS 19.5 (H) 1.7 - 8.2 K/UL  
 ABS. LYMPHOCYTES 1.4 0.5 - 4.6 K/UL  
 ABS. MONOCYTES 1.1 0.1 - 1.3 K/UL  
 ABS. EOSINOPHILS 0.0 0.0 - 0.8 K/UL  
 ABS. BASOPHILS 0.1 0.0 - 0.2 K/UL  
 ABS. IMM. GRANS. 0.5 0.0 - 0.5 K/UL METABOLIC PANEL, BASIC  Collection Time: 05/10/19  8:31 AM  
 Result Value Ref Range Sodium 140 136 - 145 mmol/L Potassium 3.2 (L) 3.5 - 5.1 mmol/L Chloride 109 (H) 98 - 107 mmol/L  
 CO2 21 21 - 32 mmol/L Anion gap 10 7 - 16 mmol/L Glucose 128 (H) 65 - 100 mg/dL BUN 17 6 - 23 MG/DL Creatinine 0.66 0.6 - 1.0 MG/DL  
 GFR est AA >60 >60 ml/min/1.73m2 GFR est non-AA >60 >60 ml/min/1.73m2 Calcium 7.6 (L) 8.3 - 10.4 MG/DL Imaging Harlin Shannon /Studies: No results found. Assessment and Plan:  
 
Cont iv abx Follow urine and blood CS Has bacteremia G+C 1/2 IVF No indication for narcotics K replacement Dispo: home Signed By: Jos Lopez MD   
 May 10, 2019

## 2019-05-10 NOTE — PROGRESS NOTES
Patient very rude, throwing used swab at staff and in the floor. When confronting patient, states, \"you werent standing there, it didn't hit you\". Patient dropped Xarelto in the floor. Pain medication given for 10/10 generalized chronic pain; see MAR.

## 2019-05-11 LAB
ANION GAP SERPL CALC-SCNC: 10 MMOL/L (ref 7–16)
BASOPHILS # BLD: 0.1 K/UL (ref 0–0.2)
BASOPHILS NFR BLD: 0 % (ref 0–2)
BUN SERPL-MCNC: 11 MG/DL (ref 6–23)
CALCIUM SERPL-MCNC: 7.8 MG/DL (ref 8.3–10.4)
CHLORIDE SERPL-SCNC: 107 MMOL/L (ref 98–107)
CO2 SERPL-SCNC: 23 MMOL/L (ref 21–32)
CREAT SERPL-MCNC: 0.51 MG/DL (ref 0.6–1)
DIFFERENTIAL METHOD BLD: ABNORMAL
EOSINOPHIL # BLD: 0 K/UL (ref 0–0.8)
EOSINOPHIL NFR BLD: 0 % (ref 0.5–7.8)
ERYTHROCYTE [DISTWIDTH] IN BLOOD BY AUTOMATED COUNT: 15.5 % (ref 11.9–14.6)
GLUCOSE SERPL-MCNC: 109 MG/DL (ref 65–100)
HCT VFR BLD AUTO: 29.6 % (ref 35.8–46.3)
HGB BLD-MCNC: 10.1 G/DL (ref 11.7–15.4)
IMM GRANULOCYTES # BLD AUTO: 0.6 K/UL (ref 0–0.5)
IMM GRANULOCYTES NFR BLD AUTO: 3 % (ref 0–5)
LYMPHOCYTES # BLD: 1.9 K/UL (ref 0.5–4.6)
LYMPHOCYTES NFR BLD: 10 % (ref 13–44)
MCH RBC QN AUTO: 27.2 PG (ref 26.1–32.9)
MCHC RBC AUTO-ENTMCNC: 34.1 G/DL (ref 31.4–35)
MCV RBC AUTO: 79.8 FL (ref 79.6–97.8)
MONOCYTES # BLD: 0.9 K/UL (ref 0.1–1.3)
MONOCYTES NFR BLD: 5 % (ref 4–12)
NEUTS SEG # BLD: 16.7 K/UL (ref 1.7–8.2)
NEUTS SEG NFR BLD: 83 % (ref 43–78)
NRBC # BLD: 0 K/UL (ref 0–0.2)
PLATELET # BLD AUTO: 224 K/UL (ref 150–450)
PMV BLD AUTO: 10.8 FL (ref 9.4–12.3)
POTASSIUM SERPL-SCNC: 2.5 MMOL/L (ref 3.5–5.1)
RBC # BLD AUTO: 3.71 M/UL (ref 4.05–5.2)
SODIUM SERPL-SCNC: 140 MMOL/L (ref 136–145)
WBC # BLD AUTO: 20.2 K/UL (ref 4.3–11.1)

## 2019-05-11 PROCEDURE — 77030020263 HC SOL INJ SOD CL0.9% LFCR 1000ML

## 2019-05-11 PROCEDURE — 85025 COMPLETE CBC W/AUTO DIFF WBC: CPT

## 2019-05-11 PROCEDURE — 87040 BLOOD CULTURE FOR BACTERIA: CPT

## 2019-05-11 PROCEDURE — 74011250637 HC RX REV CODE- 250/637: Performed by: HOSPITALIST

## 2019-05-11 PROCEDURE — 80048 BASIC METABOLIC PNL TOTAL CA: CPT

## 2019-05-11 PROCEDURE — 36415 COLL VENOUS BLD VENIPUNCTURE: CPT

## 2019-05-11 PROCEDURE — 74011250636 HC RX REV CODE- 250/636: Performed by: HOSPITALIST

## 2019-05-11 PROCEDURE — 74011250637 HC RX REV CODE- 250/637: Performed by: FAMILY MEDICINE

## 2019-05-11 PROCEDURE — 87205 SMEAR GRAM STAIN: CPT

## 2019-05-11 PROCEDURE — 65270000029 HC RM PRIVATE

## 2019-05-11 PROCEDURE — 74011250636 HC RX REV CODE- 250/636: Performed by: FAMILY MEDICINE

## 2019-05-11 RX ORDER — POTASSIUM CHLORIDE 14.9 MG/ML
20 INJECTION INTRAVENOUS
Status: COMPLETED | OUTPATIENT
Start: 2019-05-11 | End: 2019-05-12

## 2019-05-11 RX ORDER — LOPERAMIDE HYDROCHLORIDE 2 MG/1
2 CAPSULE ORAL 2 TIMES DAILY
Status: DISCONTINUED | OUTPATIENT
Start: 2019-05-11 | End: 2019-05-11

## 2019-05-11 RX ORDER — LOPERAMIDE HYDROCHLORIDE 2 MG/1
2 CAPSULE ORAL 2 TIMES DAILY
Status: COMPLETED | OUTPATIENT
Start: 2019-05-11 | End: 2019-05-12

## 2019-05-11 RX ORDER — POTASSIUM CHLORIDE 20 MEQ/1
40 TABLET, EXTENDED RELEASE ORAL
Status: COMPLETED | OUTPATIENT
Start: 2019-05-11 | End: 2019-05-11

## 2019-05-11 RX ADMIN — RIVAROXABAN 15 MG: 15 TABLET, FILM COATED ORAL at 08:33

## 2019-05-11 RX ADMIN — ACETAMINOPHEN 650 MG: 325 TABLET, FILM COATED ORAL at 15:34

## 2019-05-11 RX ADMIN — Medication 10 ML: at 05:50

## 2019-05-11 RX ADMIN — SODIUM CHLORIDE 150 ML/HR: 900 INJECTION, SOLUTION INTRAVENOUS at 00:43

## 2019-05-11 RX ADMIN — Medication 5 ML: at 08:35

## 2019-05-11 RX ADMIN — Medication 1 AMPULE: at 20:56

## 2019-05-11 RX ADMIN — URINARY PAIN RELIEF 190 MG: 95 TABLET ORAL at 05:49

## 2019-05-11 RX ADMIN — POTASSIUM CHLORIDE 20 MEQ: 200 INJECTION, SOLUTION INTRAVENOUS at 08:35

## 2019-05-11 RX ADMIN — LOPERAMIDE HYDROCHLORIDE 2 MG: 2 CAPSULE ORAL at 20:55

## 2019-05-11 RX ADMIN — POTASSIUM CHLORIDE 20 MEQ: 200 INJECTION, SOLUTION INTRAVENOUS at 10:49

## 2019-05-11 RX ADMIN — Medication 1 AMPULE: at 08:32

## 2019-05-11 RX ADMIN — ACETAMINOPHEN 650 MG: 325 TABLET, FILM COATED ORAL at 20:55

## 2019-05-11 RX ADMIN — ACETAMINOPHEN 650 MG: 325 TABLET, FILM COATED ORAL at 05:51

## 2019-05-11 RX ADMIN — POTASSIUM CHLORIDE 40 MEQ: 20 TABLET, EXTENDED RELEASE ORAL at 08:35

## 2019-05-11 RX ADMIN — RIVAROXABAN 15 MG: 15 TABLET, FILM COATED ORAL at 17:25

## 2019-05-11 RX ADMIN — LOPERAMIDE HYDROCHLORIDE 2 MG: 2 CAPSULE ORAL at 17:24

## 2019-05-11 RX ADMIN — Medication 10 ML: at 20:55

## 2019-05-11 RX ADMIN — URINARY PAIN RELIEF 190 MG: 95 TABLET ORAL at 15:30

## 2019-05-11 NOTE — PROGRESS NOTES
Problem: Falls - Risk of 
Goal: *Absence of Falls Description Document Corina Matta Fall Risk and appropriate interventions in the flowsheet. Outcome: Progressing Towards Goal 
  
Problem: Patient Education: Go to Patient Education Activity Goal: Patient/Family Education Outcome: Progressing Towards Goal 
  
Problem: Patient Education: Go to Patient Education Activity Goal: Patient/Family Education Outcome: Progressing Towards Goal 
  
Problem: Sepsis: Day of Diagnosis Goal: Off Pathway (Use only if patient is Off Pathway) Outcome: Progressing Towards Goal 
Goal: *Fluid resuscitation Outcome: Progressing Towards Goal 
Goal: *Paired blood cultures prior to first dose of antibiotic Outcome: Progressing Towards Goal 
Goal: *First dose of  appropriate antibiotic within 3 hours of arrival to ED, within 1 hour of arrival to ICU Outcome: Progressing Towards Goal 
Goal: *Lactic acid with first set of blood cultures Outcome: Progressing Towards Goal 
Goal: *Pneumococcal immunization (if eligible) Outcome: Progressing Towards Goal 
Goal: *Influenza immunization (if eligible) Outcome: Progressing Towards Goal 
Goal: Activity/Safety Outcome: Progressing Towards Goal 
Goal: Consults, if ordered Outcome: Progressing Towards Goal 
Goal: Diagnostic Test/Procedures Outcome: Progressing Towards Goal 
Goal: Nutrition/Diet Outcome: Progressing Towards Goal 
Goal: Discharge Planning Outcome: Progressing Towards Goal 
Goal: Medications Outcome: Progressing Towards Goal 
Goal: Respiratory Outcome: Progressing Towards Goal 
Goal: Treatments/Interventions/Procedures Outcome: Progressing Towards Goal 
Goal: Psychosocial 
Outcome: Progressing Towards Goal 
  
Problem: Sepsis: Day 2 Goal: Off Pathway (Use only if patient is Off Pathway) Outcome: Progressing Towards Goal 
Goal: *Central Venous Pressure maintained at 8-12 mm Hg Outcome: Progressing Towards Goal 
Goal: *Hemodynamically stable Outcome: Progressing Towards Goal 
Goal: *Tolerating diet Outcome: Progressing Towards Goal 
Goal: Activity/Safety Outcome: Progressing Towards Goal 
Goal: Consults, if ordered Outcome: Progressing Towards Goal 
Goal: Diagnostic Test/Procedures Outcome: Progressing Towards Goal 
Goal: Nutrition/Diet Outcome: Progressing Towards Goal 
Goal: Discharge Planning Outcome: Progressing Towards Goal 
Goal: Medications Outcome: Progressing Towards Goal 
Goal: Respiratory Outcome: Progressing Towards Goal 
Goal: Treatments/Interventions/Procedures Outcome: Progressing Towards Goal 
Goal: Psychosocial 
Outcome: Progressing Towards Goal 
  
Problem: Sepsis: Day 3 Goal: Off Pathway (Use only if patient is Off Pathway) Outcome: Progressing Towards Goal 
Goal: *Central Venous Pressure maintained at 8-12 mm Hg Outcome: Progressing Towards Goal 
Goal: *Oxygen saturation within defined limits Outcome: Progressing Towards Goal 
Goal: *Vital sign stability Outcome: Progressing Towards Goal 
Goal: *Tolerating diet Outcome: Progressing Towards Goal 
Goal: *Demonstrates progressive activity Outcome: Progressing Towards Goal 
Goal: Activity/Safety Outcome: Progressing Towards Goal 
Goal: Consults, if ordered Outcome: Progressing Towards Goal 
Goal: Diagnostic Test/Procedures Outcome: Progressing Towards Goal 
Goal: Nutrition/Diet Outcome: Progressing Towards Goal 
Goal: Discharge Planning Outcome: Progressing Towards Goal 
Goal: Medications Outcome: Progressing Towards Goal 
Goal: Respiratory Outcome: Progressing Towards Goal 
Goal: Treatments/Interventions/Procedures Outcome: Progressing Towards Goal 
Goal: Psychosocial 
Outcome: Progressing Towards Goal 
  
Problem: Sepsis: Day 4 Goal: Off Pathway (Use only if patient is Off Pathway) Outcome: Progressing Towards Goal 
Goal: Activity/Safety Outcome: Progressing Towards Goal 
Goal: Consults, if ordered Outcome: Progressing Towards Goal 
 Goal: Diagnostic Test/Procedures Outcome: Progressing Towards Goal 
Goal: Nutrition/Diet Outcome: Progressing Towards Goal 
Goal: Discharge Planning Outcome: Progressing Towards Goal 
Goal: Medications Outcome: Progressing Towards Goal 
Goal: Respiratory Outcome: Progressing Towards Goal 
Goal: Treatments/Interventions/Procedures Outcome: Progressing Towards Goal 
Goal: Psychosocial 
Outcome: Progressing Towards Goal 
Goal: *Oxygen saturation within defined limits Outcome: Progressing Towards Goal 
Goal: *Hemodynamically stable Outcome: Progressing Towards Goal 
Goal: *Vital signs within defined limits Outcome: Progressing Towards Goal 
Goal: *Tolerating diet Outcome: Progressing Towards Goal 
Goal: *Demonstrates progressive activity Outcome: Progressing Towards Goal 
Goal: *Fluid volume maintenance Outcome: Progressing Towards Goal 
  
Problem: Sepsis: Day 5 Goal: Off Pathway (Use only if patient is Off Pathway) Outcome: Progressing Towards Goal 
Goal: *Oxygen saturation within defined limits Outcome: Progressing Towards Goal 
Goal: *Vital signs within defined limits Outcome: Progressing Towards Goal 
Goal: *Tolerating diet Outcome: Progressing Towards Goal 
Goal: *Demonstrates progressive activity Outcome: Progressing Towards Goal 
Goal: *Discharge plan identified Outcome: Progressing Towards Goal 
Goal: Activity/Safety Outcome: Progressing Towards Goal 
Goal: Consults, if ordered Outcome: Progressing Towards Goal 
Goal: Diagnostic Test/Procedures Outcome: Progressing Towards Goal 
Goal: Nutrition/Diet Outcome: Progressing Towards Goal 
Goal: Discharge Planning Outcome: Progressing Towards Goal 
Goal: Medications Outcome: Progressing Towards Goal 
Goal: Respiratory Outcome: Progressing Towards Goal 
Goal: Treatments/Interventions/Procedures Outcome: Progressing Towards Goal 
Goal: Psychosocial 
Outcome: Progressing Towards Goal 
  
Problem: Sepsis: Day 6 
 Goal: Off Pathway (Use only if patient is Off Pathway) Outcome: Progressing Towards Goal 
Goal: *Oxygen saturation within defined limits Outcome: Progressing Towards Goal 
Goal: *Vital signs within defined limits Outcome: Progressing Towards Goal 
Goal: *Tolerating diet Outcome: Progressing Towards Goal 
Goal: *Demonstrates progressive activity Outcome: Progressing Towards Goal 
Goal: Influenza immunization Outcome: Progressing Towards Goal 
Goal: *Pneumococcal immunization Outcome: Progressing Towards Goal 
Goal: Activity/Safety Outcome: Progressing Towards Goal 
Goal: Diagnostic Test/Procedures Outcome: Progressing Towards Goal 
Goal: Nutrition/Diet Outcome: Progressing Towards Goal 
Goal: Discharge Planning Outcome: Progressing Towards Goal 
Goal: Medications Outcome: Progressing Towards Goal 
Goal: Respiratory Outcome: Progressing Towards Goal 
Goal: Treatments/Interventions/Procedures Outcome: Progressing Towards Goal 
Goal: Psychosocial 
Outcome: Progressing Towards Goal 
  
Problem: Sepsis: Discharge Outcomes Goal: *Vital signs within defined limits Outcome: Progressing Towards Goal 
Goal: *Tolerating diet Outcome: Progressing Towards Goal 
Goal: *Verbalizes understanding and describes prescribed diet Outcome: Progressing Towards Goal 
Goal: *Demonstrates progressive activity Outcome: Progressing Towards Goal 
Goal: *Describes follow-up/return visits to physicians Outcome: Progressing Towards Goal 
Goal: *Verbalizes name, dosage, time, side effects, and number of days to continue medications Outcome: Progressing Towards Goal 
Goal: *Influenza immunization (Oct-Mar only) Outcome: Progressing Towards Goal 
Goal: *Pneumococcal immunization Outcome: Progressing Towards Goal 
Goal: *Lungs clear or at baseline Outcome: Progressing Towards Goal 
Goal: *Oxygen saturation returns to baseline or 90% or better on room air Outcome: Progressing Towards Goal 
Goal: *Glycemic control Outcome: Progressing Towards Goal 
Goal: *Absence of deep venous thrombosis signs and symptoms(Stroke Metric) Outcome: Progressing Towards Goal 
Goal: *Describes available resources and support systems Outcome: Progressing Towards Goal 
Goal: *Optimal pain control at patient's stated goal 
Outcome: Progressing Towards Goal

## 2019-05-11 NOTE — PROGRESS NOTES
Pt c/o pain 10/10 in back and arms. No narcotics ordered per md order. Prn tylenol 650 mg po given at this time

## 2019-05-11 NOTE — PROGRESS NOTES
HOSPITALIST 
NAME:  Jose Alberto Mortensen Age:  26 y.o. 
:   1985 MRN:   [id number] PCP: None Consulting MD: Treatment Team: Attending Provider: Param Hyde MD; Care Manager: Chelsea Marino RN; Utilization Review: Ana Solis RN 
 
CHIEF COMPLAINT: chills, body aches 
 
subj  
 
a 26 y.o. female with a past medical history of IV drug use who presents to the ER with report of 3 days of feeling chills and body aches all over. She denies any shana fevers, nausea, vomiting, dysuria, cough, congestion. Today, no fever this am, no ac events Objective:  
 
Visit Vitals BP 91/53 Pulse 85 Temp 98.1 °F (36.7 °C) Resp 17 Ht 5' 3\" (1.6 m) Wt 77.1 kg (170 lb) SpO2 98% Breastfeeding? Yes  
BMI 30.11 kg/m² Temp (24hrs), Av.6 °F (37 °C), Min:97.9 °F (36.6 °C), Max:99.9 °F (37.7 °C) Oxygen Therapy O2 Sat (%): 98 % (19 0720) Pulse via Oximetry: 119 beats per minute (19) O2 Device: Room air (19) Physical Exam: 
General:    AAO, sleepy Lungs:   Clear to auscultation bilaterally Heart:   Regular rate and rhythm, without murmurs, rubs, or gallops. Abdomen:   Soft, non-tender, non-distended with normoactive bowel sounds. Genitourinary: No tenderness over the bladder or bilateral CVAs. Extremities: Without clubbing, cyanosis, or edema. Skin:     Normal color, texture, and turgor. No rashes, lesions, or jaundice. Neurologic: grossly intact Data Review:  
Recent Results (from the past 24 hour(s)) CBC WITH AUTOMATED DIFF Collection Time: 19  6:25 AM  
Result Value Ref Range WBC 20.2 (H) 4.3 - 11.1 K/uL  
 RBC 3.71 (L) 4.05 - 5.2 M/uL  
 HGB 10.1 (L) 11.7 - 15.4 g/dL HCT 29.6 (L) 35.8 - 46.3 % MCV 79.8 79.6 - 97.8 FL  
 MCH 27.2 26.1 - 32.9 PG  
 MCHC 34.1 31.4 - 35.0 g/dL  
 RDW 15.5 (H) 11.9 - 14.6 % PLATELET 429 667 - 743 K/uL MPV 10.8 9.4 - 12.3 FL ABSOLUTE NRBC 0.00 0.0 - 0.2 K/uL  
 DF AUTOMATED NEUTROPHILS 83 (H) 43 - 78 % LYMPHOCYTES 10 (L) 13 - 44 % MONOCYTES 5 4.0 - 12.0 % EOSINOPHILS 0 (L) 0.5 - 7.8 % BASOPHILS 0 0.0 - 2.0 % IMMATURE GRANULOCYTES 3 0.0 - 5.0 %  
 ABS. NEUTROPHILS 16.7 (H) 1.7 - 8.2 K/UL  
 ABS. LYMPHOCYTES 1.9 0.5 - 4.6 K/UL  
 ABS. MONOCYTES 0.9 0.1 - 1.3 K/UL  
 ABS. EOSINOPHILS 0.0 0.0 - 0.8 K/UL  
 ABS. BASOPHILS 0.1 0.0 - 0.2 K/UL  
 ABS. IMM. GRANS. 0.6 (H) 0.0 - 0.5 K/UL METABOLIC PANEL, BASIC Collection Time: 05/11/19  6:25 AM  
Result Value Ref Range Sodium 140 136 - 145 mmol/L Potassium 2.5 (LL) 3.5 - 5.1 mmol/L Chloride 107 98 - 107 mmol/L  
 CO2 23 21 - 32 mmol/L Anion gap 10 7 - 16 mmol/L Glucose 109 (H) 65 - 100 mg/dL BUN 11 6 - 23 MG/DL Creatinine 0.51 (L) 0.6 - 1.0 MG/DL  
 GFR est AA >60 >60 ml/min/1.73m2 GFR est non-AA >60 >60 ml/min/1.73m2 Calcium 7.8 (L) 8.3 - 10.4 MG/DL Imaging Ileene Frees /Studies: No results found. Assessment and Plan: 1- Bacteremia, MSSA, likely dt IVD use 2- Ac UTI, urine G-R Rx: 
Cont iv abx Follow urine and blood CS Has bacteremia G+C 2/2 Further w/o or echo TBD IVF No indication for narcotics K replacement Dispo: home Signed By: Enoch Edmond MD   
 May 11, 2019

## 2019-05-11 NOTE — PROGRESS NOTES
Assumed care of patient. Assessment completed and documented, see docflow. Patient resting quietly in bed. Awakens to voice, returns to sleep. NAD noted at present. Respirations even and non labored on RA. Call light within reach. Will continue to monitor.

## 2019-05-11 NOTE — PROGRESS NOTES
Pt request something for pain. Pt states her pain in located in her back 10/10. No narcotics ordered per MD. Prn tylenol 650 mg po given at this time

## 2019-05-11 NOTE — PROGRESS NOTES
Reviewed notes prior to visit [facility] Room was dark Patient was awake No family present Used visit to build rapport Patient didn't say much - did thank  for visit Will follow Soni Koehler, staff [address], [id number]  /   [email or web address]

## 2019-05-11 NOTE — PROGRESS NOTES
Tylenol 650mg given PO for c/o headache. Patient requested something for diarrhea. Dr Nissa Hernandez paged in regards

## 2019-05-12 LAB — POTASSIUM SERPL-SCNC: 3.2 MMOL/L (ref 3.5–5.1)

## 2019-05-12 PROCEDURE — 74011250636 HC RX REV CODE- 250/636: Performed by: FAMILY MEDICINE

## 2019-05-12 PROCEDURE — 74011250636 HC RX REV CODE- 250/636: Performed by: HOSPITALIST

## 2019-05-12 PROCEDURE — 65270000029 HC RM PRIVATE

## 2019-05-12 PROCEDURE — 74011000258 HC RX REV CODE- 258: Performed by: HOSPITALIST

## 2019-05-12 PROCEDURE — 36415 COLL VENOUS BLD VENIPUNCTURE: CPT

## 2019-05-12 PROCEDURE — 74011250637 HC RX REV CODE- 250/637: Performed by: FAMILY MEDICINE

## 2019-05-12 PROCEDURE — 84132 ASSAY OF SERUM POTASSIUM: CPT

## 2019-05-12 PROCEDURE — 74011250637 HC RX REV CODE- 250/637: Performed by: HOSPITALIST

## 2019-05-12 PROCEDURE — 65660000000 HC RM CCU STEPDOWN

## 2019-05-12 RX ORDER — ACETAMINOPHEN 325 MG/1
650 TABLET ORAL
Status: DISCONTINUED | OUTPATIENT
Start: 2019-05-12 | End: 2019-06-02 | Stop reason: HOSPADM

## 2019-05-12 RX ORDER — POTASSIUM CHLORIDE 20 MEQ/1
40 TABLET, EXTENDED RELEASE ORAL 2 TIMES DAILY
Status: DISPENSED | OUTPATIENT
Start: 2019-05-12 | End: 2019-05-13

## 2019-05-12 RX ORDER — PROMETHAZINE HYDROCHLORIDE 25 MG/1
25 TABLET ORAL
Status: DISCONTINUED | OUTPATIENT
Start: 2019-05-12 | End: 2019-06-02 | Stop reason: HOSPADM

## 2019-05-12 RX ADMIN — CEFTRIAXONE 2 G: 2 INJECTION, POWDER, FOR SOLUTION INTRAMUSCULAR; INTRAVENOUS at 22:14

## 2019-05-12 RX ADMIN — PROMETHAZINE HYDROCHLORIDE 25 MG: 25 TABLET ORAL at 16:59

## 2019-05-12 RX ADMIN — ONDANSETRON 4 MG: 2 INJECTION INTRAMUSCULAR; INTRAVENOUS at 22:26

## 2019-05-12 RX ADMIN — Medication 10 ML: at 05:09

## 2019-05-12 RX ADMIN — ACETAMINOPHEN 650 MG: 325 TABLET, FILM COATED ORAL at 02:51

## 2019-05-12 RX ADMIN — LOPERAMIDE HYDROCHLORIDE 2 MG: 2 CAPSULE ORAL at 08:32

## 2019-05-12 RX ADMIN — Medication 1 AMPULE: at 08:32

## 2019-05-12 RX ADMIN — CEFTRIAXONE 2 G: 2 INJECTION, POWDER, FOR SOLUTION INTRAMUSCULAR; INTRAVENOUS at 00:06

## 2019-05-12 RX ADMIN — Medication 10 ML: at 13:06

## 2019-05-12 RX ADMIN — POTASSIUM CHLORIDE 40 MEQ: 20 TABLET, EXTENDED RELEASE ORAL at 11:27

## 2019-05-12 RX ADMIN — ONDANSETRON 4 MG: 2 INJECTION INTRAMUSCULAR; INTRAVENOUS at 14:17

## 2019-05-12 RX ADMIN — ACETAMINOPHEN 650 MG: 325 TABLET, FILM COATED ORAL at 22:12

## 2019-05-12 RX ADMIN — Medication 1 AMPULE: at 22:12

## 2019-05-12 RX ADMIN — Medication 5 ML: at 22:13

## 2019-05-12 RX ADMIN — ONDANSETRON 4 MG: 2 INJECTION INTRAMUSCULAR; INTRAVENOUS at 10:10

## 2019-05-12 RX ADMIN — RIVAROXABAN 15 MG: 15 TABLET, FILM COATED ORAL at 16:55

## 2019-05-12 RX ADMIN — RIVAROXABAN 15 MG: 15 TABLET, FILM COATED ORAL at 08:32

## 2019-05-12 RX ADMIN — ACETAMINOPHEN 650 MG: 325 TABLET, FILM COATED ORAL at 09:03

## 2019-05-12 RX ADMIN — LOPERAMIDE HYDROCHLORIDE 2 MG: 2 CAPSULE ORAL at 22:12

## 2019-05-12 RX ADMIN — Medication 10 ML: at 14:19

## 2019-05-12 RX ADMIN — ACETAMINOPHEN 650 MG: 325 TABLET, FILM COATED ORAL at 16:59

## 2019-05-12 NOTE — PROGRESS NOTES
Pt lying in bed on room air. Pt is alert and oriented within complaints at this time. Pt instructed to call for assistance, call light in reach

## 2019-05-12 NOTE — PROGRESS NOTES
Pt resting quietly in bed on room air. RR even and unlabored. No s/sx of distress noted. Call light in reach

## 2019-05-12 NOTE — PROGRESS NOTES
HOSPITALIST 
NAME:  Debo Nazario Age:  26 y.o. 
:   1985 MRN:   [id number] PCP: None Consulting MD: Treatment Team: Attending Provider: Elana Foster MD; Care Manager: Rodriguez Steward RN; Utilization Review: Kenzie Avina RN 
 
CHIEF COMPLAINT: chills, body aches 
 
subj  
 
a 26 y.o. female with a past medical history of IV drug use who presents to the ER with report of 3 days of feeling chills and body aches all over. She denies any shana fevers, nausea, vomiting, dysuria, cough, congestion. Today, no fever this am, no ac events Objective:  
 
Visit Vitals /61 Pulse (!) 106 Temp 99.7 °F (37.6 °C) Resp 18 Ht 5' 3\" (1.6 m) Wt 77.1 kg (170 lb) SpO2 95% Breastfeeding? Yes  
BMI 30.11 kg/m² Temp (24hrs), Av.4 °F (37.4 °C), Min:98.9 °F (37.2 °C), Max:100.1 °F (37.8 °C) Oxygen Therapy O2 Sat (%): 95 % (19 1056) Pulse via Oximetry: 119 beats per minute (19 2121) O2 Device: Room air (19 0725) Physical Exam: 
General:    AAO3 Lungs:   Clear to auscultation bilaterally Heart:   Regular rate and rhythm, without murmurs, rubs, or gallops. Abdomen:   Soft, non-tender, non-distended with normoactive bowel sounds. Genitourinary: No tenderness over the bladder or bilateral CVAs. Extremities: Without clubbing, cyanosis, or edema. Skin:     Normal color, texture, and turgor. No rashes, lesions, or jaundice. Neurologic: grossly intact Data Review:  
Recent Results (from the past 24 hour(s)) POTASSIUM Collection Time: 19  9:03 AM  
Result Value Ref Range Potassium 3.2 (L) 3.5 - 5.1 mmol/L Imaging Ching Coffee /Studies: No results found. Assessment and Plan: 1- Bacteremia, MSSA, likely dt IVD use 2- Ac UTI, urine G-R Rx: 
Cont iv Rocephin Follow urine and blood CS final results Further work up/ echo/ ID consult TBD after cultures finalized No indication for narcotics K replacement as needed Dispo: TBD, may need long term hospitalization for IV abx Signed By: Marcos Tay MD   
 May 12, 2019

## 2019-05-12 NOTE — PROGRESS NOTES
Patient does not want to take Potassium at this time. She requests she be able to take it later with her night medications. Medication rescheduled for 2100.

## 2019-05-12 NOTE — PROGRESS NOTES
REINIER recevied from [facility], [facility]. Patient remains in stable condition with respirations even/unlabored. No acute distress noted at this time. Patient on room air. Patient resting with eyes closed when nurse entered room and was very rude when nurse spoke to her. Call light remains within reach, patient encouraged to call nurse prn assist. Will continue to monitor per policy.

## 2019-05-12 NOTE — PROGRESS NOTES
MD notified of patient request for pain medication. MD specifies no need for narcotics and states that tylenol can be given for pain. Order modified to Tylenol q 6 hours prn fever or pain. EMAR does not appear to be reflecting that change. MD is aware that medication is being administered for pain.

## 2019-05-13 ENCOUNTER — APPOINTMENT (OUTPATIENT)
Dept: GENERAL RADIOLOGY | Age: 34
DRG: 871 | End: 2019-05-13
Attending: INTERNAL MEDICINE
Payer: SUBSIDIZED

## 2019-05-13 LAB
BACTERIA SPEC CULT: ABNORMAL
BACTERIA SPEC CULT: ABNORMAL
SERVICE CMNT-IMP: ABNORMAL

## 2019-05-13 PROCEDURE — 71045 X-RAY EXAM CHEST 1 VIEW: CPT

## 2019-05-13 PROCEDURE — 74011000250 HC RX REV CODE- 250: Performed by: INTERNAL MEDICINE

## 2019-05-13 PROCEDURE — 74011250636 HC RX REV CODE- 250/636: Performed by: INTERNAL MEDICINE

## 2019-05-13 PROCEDURE — 74011250637 HC RX REV CODE- 250/637: Performed by: FAMILY MEDICINE

## 2019-05-13 PROCEDURE — 74011250636 HC RX REV CODE- 250/636: Performed by: FAMILY MEDICINE

## 2019-05-13 PROCEDURE — 74011250637 HC RX REV CODE- 250/637: Performed by: HOSPITALIST

## 2019-05-13 PROCEDURE — 77030037759

## 2019-05-13 PROCEDURE — 65660000000 HC RM CCU STEPDOWN

## 2019-05-13 PROCEDURE — 65270000029 HC RM PRIVATE

## 2019-05-13 PROCEDURE — 76937 US GUIDE VASCULAR ACCESS: CPT

## 2019-05-13 PROCEDURE — 93306 TTE W/DOPPLER COMPLETE: CPT

## 2019-05-13 RX ORDER — LORAZEPAM 2 MG/ML
2 INJECTION INTRAMUSCULAR AS NEEDED
Status: DISCONTINUED | OUTPATIENT
Start: 2019-05-13 | End: 2019-05-15

## 2019-05-13 RX ORDER — VANCOMYCIN HYDROCHLORIDE
1250 EVERY 8 HOURS
Status: DISCONTINUED | OUTPATIENT
Start: 2019-05-14 | End: 2019-05-14

## 2019-05-13 RX ORDER — VANCOMYCIN 2 GRAM/500 ML IN 0.9 % SODIUM CHLORIDE INTRAVENOUS
2000 ONCE
Status: DISPENSED | OUTPATIENT
Start: 2019-05-13 | End: 2019-05-13

## 2019-05-13 RX ADMIN — ACETAMINOPHEN 650 MG: 325 TABLET, FILM COATED ORAL at 06:38

## 2019-05-13 RX ADMIN — RIVAROXABAN 15 MG: 15 TABLET, FILM COATED ORAL at 16:29

## 2019-05-13 RX ADMIN — NAFCILLIN SODIUM 12 G: 2 INJECTION, POWDER, LYOPHILIZED, FOR SOLUTION INTRAMUSCULAR; INTRAVENOUS at 17:35

## 2019-05-13 RX ADMIN — Medication 1 AMPULE: at 20:37

## 2019-05-13 RX ADMIN — Medication 5 ML: at 08:55

## 2019-05-13 RX ADMIN — RIVAROXABAN 15 MG: 15 TABLET, FILM COATED ORAL at 08:53

## 2019-05-13 RX ADMIN — Medication 5 ML: at 06:28

## 2019-05-13 RX ADMIN — Medication 1 AMPULE: at 08:53

## 2019-05-13 RX ADMIN — Medication 5 ML: at 20:37

## 2019-05-13 RX ADMIN — ONDANSETRON 4 MG: 2 INJECTION INTRAMUSCULAR; INTRAVENOUS at 07:10

## 2019-05-13 RX ADMIN — ACETAMINOPHEN 650 MG: 325 TABLET, FILM COATED ORAL at 12:34

## 2019-05-13 NOTE — PROGRESS NOTES
BSR received. Pt resting in bed. Pt alert and oriented 4. Respirations are even and unlabored on room air. Pt states no pain at this time. Call light within reach, bed low and locked.

## 2019-05-13 NOTE — CONSULTS
Infectious Disease Consult    Today's Date: 2019   Admit Date: 2019    Impression:   · Staphylococcus aureus bacteremia, susceptibility pending  · IVDU  · Back pain    Plan:   ·  Vancomycin pending susceptibility  · Nafcillin 12 gm/day. · Will need prolonged therapy, 4-6 weeks  · Not a candidate for outpatient treatment  · MRI spine- may need sedation, analgesia to complete, discussed with Hospitalist  · Echocardiogram-  Discussed with Hospitalist, Cardiology consulted, would start with TTE, TC if nondiagnostic  · CXR  · Will need venous access when blood cultures cleared. Anti-infectives:   · Ceftriaxone    Subjective:   Date of Consultation:  May 13, 2019  Referring Physician: Shailesh    Patient is a 26 y.o. female with history of intravenous heroin injection up until admission admitted with chills, fever, body aches, blood cultures positive for S. Aureus. Susceptibility being repeated due to inconsistent oxacillin results. Patient complains of diffuse pain, particularly back pain, not able to localize level. Wearing diaper, starts hard to walk to bathroom. Patient Active Problem List   Diagnosis Code    IVDU (intravenous drug user) F19.90    Severe sepsis ([address] A41.9, R65.20    UTI (urinary tract infection) N39.0    History of pulmonary embolism Z86.711    Hypokalemia E87.6     History reviewed. No pertinent past medical history. History reviewed. No pertinent family history. Social History     Tobacco Use    Smoking status: Never Smoker    Smokeless tobacco: Never Used   Substance Use Topics    Alcohol use: No     Frequency: Never     Past Surgical History:   Procedure Laterality Date    HX  SECTION      HX GYN      HX ORTHOPAEDIC  2018    left arm     HX TUBAL LIGATION Bilateral       Prior to Admission medications    Medication Sig Start Date End Date Taking?  Authorizing Provider   rivaroxaban (XARELTO) 15 mg (42)- 20 mg (9) DsPk Take one 15 mg tablet twice a day with food for the first 21 days. Then, take one 20 mg tablet once a day with food for 9 days. 3/30/19   Blaze Erickson MD       No Known Allergies     Review of Systems:  A comprehensive review of systems was negative except for that written in the History of Present Illness. Objective:     Visit Vitals  /81   Pulse 83   Temp 98.4 °F (36.9 °C)   Resp 19   Ht 5' 3\" (1.6 m)   Wt 77.1 kg (170 lb)   SpO2 95%   Breastfeeding? Yes   BMI 30.11 kg/m²     Temp (24hrs), Av.8 °F (37.1 °C), Min:98 °F (36.7 °C), Max:99.7 °F (37.6 °C)       Lines:  Peripheral IV:       Physical Exam:    General:  Alert, cooperative, well noursished, well developed, appears stated age   Eyes:  Sclera anicteric. Pupils equally round and reactive to light. Mouth/Throat: Mucous membranes normal, poor dentition   Neck: Supple   Lungs:   Clear to auscultation bilaterally, good effort   CV:  Regular rate and rhythm,no murmur, click, rub or gallop   Abdomen:   Soft, non-tender. bowel sounds normal. non-distended   Extremities: No cyanosis or edema   Skin: tatooes   Musculoskeletal: No focal pain elicited to percussion over spine   Lines/Devices:  Intact, no erythema, drainage or tenderness  Neuro:  Intact motor function LEs   Psych: Alert and oriented, normal mood affect given the setting       Data Review:     CBC:  Recent Labs     19  0625   WBC 20.2*   GRANS 83*   MONOS 5   EOS 0*   ANEU 16.7*   ABL 1.9   HGB 10.1*   HCT 29.6*          BMP:  Recent Labs     19  0903 19  0625   CREA  --  0.51*   BUN  --  11   NA  --  140   K 3.2* 2.5*   CL  --  107   CO2  --  23   AGAP  --  10   GLU  --  109*       LFTS:  No results for input(s): TBILI, ALT, SGOT, AP, TP, ALB in the last 72 hours.     Microbiology:     All Micro Results     Procedure Component Value Units Date/Time    CULTURE, BLOOD [[id number]]  (Abnormal) Collected:  19 0854    Order Status:  Completed Specimen:  Blood Updated:  19 5609 Special Requests: --        LEFT  HAND       GRAM STAIN GRAM POSITIVE COCCI         PEDIATRIC BOTTLE               RESULTS VERIFIED, PHONED TO AND READ BACK BY General Askew @7879 05/12/2019 JB           Culture result: STAPHYLOCOCCUS AUREUS               CULTURE IN PROGRESS,FURTHER UPDATES TO FOLLOW          CULTURE, BLOOD [[id number]]  (Abnormal) Collected:  05/11/19 0854    Order Status:  Completed Specimen:  Blood Updated:  05/13/19 0735     Special Requests: --        RIGHT  ARM       GRAM STAIN       GRAM POS COCCI IN CLUSTERS            PEDIATRIC BOTTLE               CRITICAL RESULT NOT CALLED DUE TO PREVIOUS NOTIFICATION OF CRITICAL RESULT WITHIN THE LAST 24 HOURS. Culture result: STAPHYLOCOCCUS AUREUS               CULTURE IN PROGRESS,FURTHER UPDATES TO FOLLOW          CULTURE, URINE [[id number]]  (Abnormal)  (Susceptibility) Collected:  05/09/19 2027    Order Status:  Completed Specimen:  Urine from Clean catch Updated:  05/13/19 0733     Special Requests: NO SPECIAL REQUESTS        Culture result:       >100,000 COLONIES/mL ESCHERICHIA COLI                  10,000 to 50,000 COLONIES/mL STAPHYLOCOCCUS AUREUS          CULTURE, BLOOD [[id number]]  (Abnormal) Collected:  05/09/19 1903    Order Status:  Completed Specimen:  Whole Blood Updated:  05/13/19 0730     Special Requests: --        RIGHT  Antecubital       GRAM STAIN       GRAM POS COCCI IN CLUSTERS                  AEROBIC AND ANAEROBIC BOTTLES                  RESULTS VERIFIED, PHONED TO AND READ BACK BY Doe Stack RN @ 1200 ON 5/10/19 BY ADS           Culture result:       STAPHYLOCOCCUS AUREUS /REPEATING SUSCEPTIBILITY ON 05/13/19                  MRSA target DNA sequences not detected: SA target DNA sequence detected within the sample.  Test performed by PCR  Culture/Sensitivity to follow          CULTURE, BLOOD [[id number]]  (Abnormal) Collected:  05/09/19 1822    Order Status:  Completed Specimen:  Whole Blood Updated:  05/11/19 6139     Special Requests: --        RIGHT  FOREARM       GRAM STAIN       GRAM POS COCCI IN CLUSTERS                  AEROBIC AND ANAEROBIC BOTTLES                  CRITICAL RESULT NOT CALLED DUE TO PREVIOUS NOTIFICATION OF CRITICAL RESULT WITHIN THE LAST 24 HOURS.            Culture result: STAPHYLOCOCCUS AUREUS               CULTURE IN PROGRESS,FURTHER UPDATES TO FOLLOW                Imaging:       Signed By: Gogo Cade MD     May 13, 2019

## 2019-05-13 NOTE — PROGRESS NOTES
Pt resting quietly in bed, sleeping. RR even and unlabored. No s/sx of nausea noted. Pt has not had any vomiting and has been eating chocolate ice cream and popsicles throughout shift

## 2019-05-13 NOTE — PROGRESS NOTES
Patient is requiring IV antibiotics. She is a known IV drug abuser. She will therefore not be discharged home with an IV or PICC in place. We anticipate an extended hospital stay for IV antibiotic infusion. Case management will continue to follow. Care Management Interventions PCP Verified by CM: No 
Transition of Care Consult (CM Consult): Discharge Planning Discharge Durable Medical Equipment: No 
Physical Therapy Consult: No 
Occupational Therapy Consult: No 
Speech Therapy Consult: No 
Current Support Network: Own Home Confirm Follow Up Transport: Self Plan discussed with Pt/Family/Caregiver: Yes Freedom of Choice Offered: Yes Discharge Location Discharge Placement: Home

## 2019-05-13 NOTE — PROGRESS NOTES
Pharmacokinetic Consult to Pharmacist 
 
Bebo Annette is a 26 y.o. female being treated for SA bacteremia with vancomycin and nafcillin. Prelim PCR indicates MSSA. Height: 5' 3\" (160 cm)  Weight: 77.1 kg (170 lb) Lab Results Component Value Date/Time BUN 11 05/11/2019 06:25 AM  
 Creatinine 0.51 (L) 05/11/2019 06:25 AM  
 WBC 20.2 (H) 05/11/2019 06:25 AM  
 Lactic Acid (POC) 2.17 (H) 05/09/2019 07:09 PM  
  
Estimated Creatinine Clearance: 151.4 mL/min (A) (based on SCr of 0.51 mg/dL (L)). Day 1 of vancomycin. Goal trough is 15-20. Vancomycin dose initiated at 2000 mg IV x 1 followed by 1250 mg IV q8h. Levels will be ordered as clinically indicated. Pharmacy will continue to follow. Please call with any questions. Thank you, Queen Luis Daniel, PharmD Clinical Pharmacist 
[phone]

## 2019-05-13 NOTE — PROGRESS NOTES
MRI checklist completed and esigned. Paula Denver in MRI notified in regards. Per Paula Denver, patient will be under [facility] for MRI studies. Patient informed.

## 2019-05-13 NOTE — PROGRESS NOTES
End of shift report PM handoff RN: Mitzy Mccollum Pain:  Patient received Tylenol 650mg x 1 occurrence this shift for generalized chronic pain; see MAR. Neuro: A/Ox3. No c/o numbness or tingling Cardio:  Remote monitor. NSR Resp:  RA clear bilaterally. Symmetric chest wall excursion. GI/:  Voiding. Urine is yellow, clear. Last BM [id number]; per patient. Diet: Regular diet. Patient tolerating diet well. See I&O's for details. Ambulation:  Patient can ambulate independently but would like bedside assistance. Leodan Fall Risk: 2. No falls during shift. Linen change: 05/11/19 Additional info: MRI under [facility] [id number]; MRI checklist/esign completed and documented.   
 
EOS handoff RN: Elyse Stover and Derrick Abrams RN

## 2019-05-13 NOTE — PROGRESS NOTES
Assumed care of patient. Assessment completed and documented, see docflow. Patient resting quietly in bed. Awakens to voice, returns to sleep. Not cooperative with assessment. NAD noted at present. Respirations even and non labored on RA. Call light within reach. Will continue to monitor.

## 2019-05-13 NOTE — CONSULTS
[facility] Cardiology Consult                Date of  Admission: 2019  5:26 PM     Primary Care Physician:  None  Primary Cardiologist:  None  Referring Physician:  Dr. Mahnaz Jaramillo Physician:  Dr. Luiz Schulte    CC/Reason for consult: MSSA bacteremia, TC      Thompson Hinojosa is a 26 y.o. female with PMH IVDU and PE, who presented to the ED with complaints of chills and fever x 3 days. In the ED her WBC was elevated @ 20.6, Na 129, K 2.9, BUN 33, creatinine 1.44, lactic 3.8, with tox screen positive for amphetamines and opiates. Patient was admitted for treatment of sepsis. BC were positive for MSSA 2/2 and UC +ecoli and staph aureus. ID has been consulted and TTE is pending. If TTE is negative will likely need TC. Patient Active Problem List   Diagnosis Code    IVDU (intravenous drug user) F19.90    Severe sepsis ([facility] [address] A41.9, R65.20    UTI (urinary tract infection) N39.0    History of pulmonary embolism Z86.711    Hypokalemia E87.6       History reviewed. No pertinent past medical history. Past Surgical History:   Procedure Laterality Date    HX  SECTION      HX GYN      HX ORTHOPAEDIC  2018    left arm     HX TUBAL LIGATION Bilateral      No Known Allergies   History reviewed. No pertinent family history.      Current Facility-Administered Medications   Medication Dose Route Frequency    cefTRIAXone (ROCEPHIN) 2 g in 0.9% sodium chloride (MBP/ADV) 50 mL  2 g IntraVENous Q24H    acetaminophen (TYLENOL) tablet 650 mg  650 mg Oral Q6H PRN    promethazine (PHENERGAN) tablet 25 mg  25 mg Oral Q6H PRN    potassium chloride (K-DUR, KLOR-CON) SR tablet 40 mEq  40 mEq Oral BID    sodium chloride (NS) flush 5-40 mL  5-40 mL IntraVENous Q8H    sodium chloride (NS) flush 5-40 mL  5-40 mL IntraVENous PRN    ondansetron (ZOFRAN) injection 4 mg  4 mg IntraVENous Q4H PRN    alcohol 62% (NOZIN) nasal  1 Ampule  1 Ampule Topical Q12H    rivaroxaban (XARELTO) tablet 15 mg  15 mg Oral BID WITH MEALS    Followed by   Jack Shine ON 5/31/2019] rivaroxaban (XARELTO) tablet 20 mg  20 mg Oral DAILY WITH DINNER       Review of Systems   Constitutional: Positive for chills and fever. HENT: Negative. Eyes: Negative. Respiratory: Negative. Cardiovascular: Negative. Gastrointestinal: Negative. Genitourinary: Positive for flank pain. Musculoskeletal: Positive for back pain. Skin: Negative. Neurological: Negative. Endo/Heme/Allergies: Negative. Psychiatric/Behavioral: Positive for substance abuse. Physical Exam  Vitals:    05/12/19 1530 05/12/19 1913 05/12/19 2346 05/13/19 0730   BP: 110/61 105/67 117/75 125/81   Pulse: (!) 107 (!) 101 67 83   Resp: 18 18 18 (!) 129   Temp: 98 °F (36.7 °C) 99.4 °F (37.4 °C) 98.5 °F (36.9 °C) 98.4 °F (36.9 °C)   SpO2: 96% 93% 96% 95%   Weight:       Height:           Physical Exam:  Physical Exam   Constitutional: She is oriented to person, place, and time. She appears unhealthy. HENT:   Mouth/Throat: Abnormal dentition. Eyes: Pupils are equal, round, and reactive to light. Neck: Normal range of motion. Cardiovascular: Regular rhythm. Tachycardia present. Pulmonary/Chest: Effort normal and breath sounds normal.   Abdominal: Soft. Bowel sounds are normal.   Musculoskeletal: Normal range of motion. Neurological: She is alert and oriented to person, place, and time. Skin: Skin is warm and dry. Psychiatric: Mood, memory, affect and judgment normal.       Cardiographics    Telemetry: ST  ECG: sinus tachycardia  Echocardiogram: pending    Labs:   Recent Labs     05/12/19  0903 05/11/19  0625   NA  --  140   K 3.2* 2.5*   BUN  --  11   CREA  --  0.51*   GLU  --  109*   WBC  --  20.2*   HGB  --  10.1*   HCT  --  29.6*   PLT  --  224        Assessment/Plan:     Assessment:      Principal Problem:    Severe sepsis -- on abx, ID consulted. TTE is pending, will likely need TC.       Active Problems:    IVDU (intravenous drug user)-- need for cessation stressedl. UTI (urinary tract infection)-- on abx       History of pulmonary embolism -- on Xarelto       Hypokalemia-- replaced     Thank you very much for this referral. We appreciate the opportunity to participate in this patient's care. We will follow along with above stated plan.     Ravi Hicks NP  Consulting MD: Ayanna Aragon

## 2019-05-13 NOTE — PROGRESS NOTES
Pt resting quietly in bed watching tv. Pt is alert and oriented and c/o pain in her back. Pt is on remote tele Sinus Tach 108 bpm. Upon assisting pt to restroom she states that she cannot move her legs because \"they hurt too bad\". Pt walked to restroom with assit x1. Pt becomes verbally aggressive because the \"doctors don't believe her pain and will not give her any pain medication\". NAD, VSS. Pt instructed to call for assistance, call light in reach

## 2019-05-13 NOTE — PROGRESS NOTES
HOSPITALIST 
NAME:  Avis Louise Age:  26 y.o. 
:   1985 MRN:   [id number] PCP: None Consulting MD: Treatment Team: Attending Provider: Casey Servin MD; Care Manager: Madeline Holland RN; Utilization Review: Skylar Will RN; Consulting Provider: Ara Gauthier MD; Consulting Provider: Alonso Myers MD; Consulting Provider: Mariana Balbuena MD 
 
CHIEF COMPLAINT: chills, body aches 
 
subj  
 
a 26 y.o. female with a past medical history of IV drug use who presents to the ER with report of 3 days of feeling chills and body aches all over. She denies any shana fevers, nausea, vomiting, dysuria, cough, congestion. Today, no fever this am, no ac events Objective:  
 
Visit Vitals /76 Pulse 88 Temp 98.8 °F (37.1 °C) Resp 19 Ht 5' 3\" (1.6 m) Wt 77.1 kg (170 lb) SpO2 95% Breastfeeding? Yes  
BMI 30.11 kg/m² Temp (24hrs), Av.7 °F (37.1 °C), Min:98.2 °F (36.8 °C), Max:99.4 °F (37.4 °C) Oxygen Therapy O2 Sat (%): 95 % (19 1524) Pulse via Oximetry: 119 beats per minute (19 2121) O2 Device: Room air (19 1419) Physical Exam: 
General:    AAO3 Lungs:   Clear to auscultation bilaterally Heart:   Regular rate and rhythm, without murmurs, rubs, or gallops. Abdomen:   Soft, non-tender, non-distended with normoactive bowel sounds. Genitourinary: No tenderness over the bladder or bilateral CVAs. Extremities: Without clubbing, cyanosis, or edema. Skin:     Normal color, texture, and turgor. No rashes, lesions, or jaundice. Neurologic: grossly intact Data Review: No results found for this or any previous visit (from the past 24 hour(s)). Imaging Eliza Dines /Studies: Xr Chest Sngl V Result Date: 2019 IMPRESSION: New faint infiltrate right base, possibly lobar pneumonia. Left lung infiltrate is cleared. Assessment and Plan: 1- Bacteremia, MSSA, likely dt IVD use 2- Ac UTI, urine E coli Rx: 
 Cont iv Rocephin Follow blood CS final results, repeat Cs positive ID and card consulted No indication for narcotics K replacement as needed Dispo: TBD, may need long term hospitalization for IV abx Signed By: Jos Lopez MD   
 May 13, 2019

## 2019-05-14 ENCOUNTER — APPOINTMENT (OUTPATIENT)
Dept: MRI IMAGING | Age: 34
DRG: 871 | End: 2019-05-14
Attending: INTERNAL MEDICINE
Payer: SUBSIDIZED

## 2019-05-14 ENCOUNTER — ANESTHESIA EVENT (OUTPATIENT)
Dept: SURGERY | Age: 34
DRG: 871 | End: 2019-05-14
Payer: SUBSIDIZED

## 2019-05-14 ENCOUNTER — ANESTHESIA (OUTPATIENT)
Dept: SURGERY | Age: 34
DRG: 871 | End: 2019-05-14
Payer: SUBSIDIZED

## 2019-05-14 LAB
BACTERIA SPEC CULT: ABNORMAL
GRAM STN SPEC: ABNORMAL
SERVICE CMNT-IMP: ABNORMAL

## 2019-05-14 PROCEDURE — 74011250636 HC RX REV CODE- 250/636: Performed by: INTERNAL MEDICINE

## 2019-05-14 PROCEDURE — 93312 ECHO TRANSESOPHAGEAL: CPT

## 2019-05-14 PROCEDURE — 36415 COLL VENOUS BLD VENIPUNCTURE: CPT

## 2019-05-14 PROCEDURE — 65660000000 HC RM CCU STEPDOWN

## 2019-05-14 PROCEDURE — 76210000006 HC OR PH I REC 0.5 TO 1 HR

## 2019-05-14 PROCEDURE — 72142 MRI NECK SPINE W/DYE: CPT

## 2019-05-14 PROCEDURE — 74011250636 HC RX REV CODE- 250/636: Performed by: FAMILY MEDICINE

## 2019-05-14 PROCEDURE — 99152 MOD SED SAME PHYS/QHP 5/>YRS: CPT

## 2019-05-14 PROCEDURE — 72147 MRI CHEST SPINE W/DYE: CPT

## 2019-05-14 PROCEDURE — A9575 INJ GADOTERATE MEGLUMI 0.1ML: HCPCS | Performed by: FAMILY MEDICINE

## 2019-05-14 PROCEDURE — 87040 BLOOD CULTURE FOR BACTERIA: CPT

## 2019-05-14 PROCEDURE — 65270000029 HC RM PRIVATE

## 2019-05-14 PROCEDURE — 76060000035 HC ANESTHESIA 2 TO 2.5 HR

## 2019-05-14 PROCEDURE — 77030037088 HC TUBE ENDOTRACH ORAL NSL COVD-A: Performed by: ANESTHESIOLOGY

## 2019-05-14 PROCEDURE — B24BZZ4 ULTRASONOGRAPHY OF HEART WITH AORTA, TRANSESOPHAGEAL: ICD-10-PCS | Performed by: INTERNAL MEDICINE

## 2019-05-14 PROCEDURE — 74011000250 HC RX REV CODE- 250: Performed by: INTERNAL MEDICINE

## 2019-05-14 PROCEDURE — 74011250636 HC RX REV CODE- 250/636

## 2019-05-14 PROCEDURE — 72158 MRI LUMBAR SPINE W/O & W/DYE: CPT

## 2019-05-14 PROCEDURE — 74011250637 HC RX REV CODE- 250/637: Performed by: FAMILY MEDICINE

## 2019-05-14 PROCEDURE — 77030039425 HC BLD LARYNG TRULITE DISP TELE -A: Performed by: ANESTHESIOLOGY

## 2019-05-14 PROCEDURE — 74011000250 HC RX REV CODE- 250

## 2019-05-14 RX ORDER — SODIUM CHLORIDE, SODIUM LACTATE, POTASSIUM CHLORIDE, CALCIUM CHLORIDE 600; 310; 30; 20 MG/100ML; MG/100ML; MG/100ML; MG/100ML
100 INJECTION, SOLUTION INTRAVENOUS CONTINUOUS
Status: DISCONTINUED | OUTPATIENT
Start: 2019-05-14 | End: 2019-05-14

## 2019-05-14 RX ORDER — SODIUM CHLORIDE 0.9 % (FLUSH) 0.9 %
10 SYRINGE (ML) INJECTION
Status: COMPLETED | OUTPATIENT
Start: 2019-05-14 | End: 2019-05-14

## 2019-05-14 RX ORDER — DEXAMETHASONE SODIUM PHOSPHATE 4 MG/ML
INJECTION, SOLUTION INTRA-ARTICULAR; INTRALESIONAL; INTRAMUSCULAR; INTRAVENOUS; SOFT TISSUE AS NEEDED
Status: DISCONTINUED | OUTPATIENT
Start: 2019-05-14 | End: 2019-05-14 | Stop reason: HOSPADM

## 2019-05-14 RX ORDER — LIDOCAINE HYDROCHLORIDE 20 MG/ML
INJECTION, SOLUTION EPIDURAL; INFILTRATION; INTRACAUDAL; PERINEURAL AS NEEDED
Status: DISCONTINUED | OUTPATIENT
Start: 2019-05-14 | End: 2019-05-14 | Stop reason: HOSPADM

## 2019-05-14 RX ORDER — PROPOFOL 10 MG/ML
INJECTION, EMULSION INTRAVENOUS AS NEEDED
Status: DISCONTINUED | OUTPATIENT
Start: 2019-05-14 | End: 2019-05-14 | Stop reason: HOSPADM

## 2019-05-14 RX ORDER — FENTANYL CITRATE 50 UG/ML
INJECTION, SOLUTION INTRAMUSCULAR; INTRAVENOUS AS NEEDED
Status: DISCONTINUED | OUTPATIENT
Start: 2019-05-14 | End: 2019-05-14 | Stop reason: HOSPADM

## 2019-05-14 RX ORDER — SODIUM CHLORIDE, SODIUM LACTATE, POTASSIUM CHLORIDE, CALCIUM CHLORIDE 600; 310; 30; 20 MG/100ML; MG/100ML; MG/100ML; MG/100ML
INJECTION, SOLUTION INTRAVENOUS
Status: DISCONTINUED | OUTPATIENT
Start: 2019-05-14 | End: 2019-05-14 | Stop reason: HOSPADM

## 2019-05-14 RX ORDER — MIDAZOLAM HYDROCHLORIDE 1 MG/ML
1-10 INJECTION, SOLUTION INTRAMUSCULAR; INTRAVENOUS AS NEEDED
Status: DISCONTINUED | OUTPATIENT
Start: 2019-05-14 | End: 2019-05-15

## 2019-05-14 RX ORDER — GADOTERATE MEGLUMINE 376.9 MG/ML
15 INJECTION INTRAVENOUS
Status: COMPLETED | OUTPATIENT
Start: 2019-05-14 | End: 2019-05-14

## 2019-05-14 RX ORDER — ROCURONIUM BROMIDE 10 MG/ML
INJECTION, SOLUTION INTRAVENOUS AS NEEDED
Status: DISCONTINUED | OUTPATIENT
Start: 2019-05-14 | End: 2019-05-14 | Stop reason: HOSPADM

## 2019-05-14 RX ORDER — FENTANYL CITRATE 50 UG/ML
25-200 INJECTION, SOLUTION INTRAMUSCULAR; INTRAVENOUS AS NEEDED
Status: DISCONTINUED | OUTPATIENT
Start: 2019-05-14 | End: 2019-05-15

## 2019-05-14 RX ORDER — ONDANSETRON 2 MG/ML
INJECTION INTRAMUSCULAR; INTRAVENOUS AS NEEDED
Status: DISCONTINUED | OUTPATIENT
Start: 2019-05-14 | End: 2019-05-14 | Stop reason: HOSPADM

## 2019-05-14 RX ADMIN — VANCOMYCIN HYDROCHLORIDE 1250 MG: 10 INJECTION, POWDER, LYOPHILIZED, FOR SOLUTION INTRAVENOUS at 03:53

## 2019-05-14 RX ADMIN — DEXAMETHASONE SODIUM PHOSPHATE 4 MG: 4 INJECTION, SOLUTION INTRA-ARTICULAR; INTRALESIONAL; INTRAMUSCULAR; INTRAVENOUS; SOFT TISSUE at 12:41

## 2019-05-14 RX ADMIN — FENTANYL CITRATE 50 MCG: 50 INJECTION, SOLUTION INTRAMUSCULAR; INTRAVENOUS at 12:16

## 2019-05-14 RX ADMIN — GADOTERATE MEGLUMINE 15 ML: 376.9 INJECTION INTRAVENOUS at 13:33

## 2019-05-14 RX ADMIN — LIDOCAINE HYDROCHLORIDE 50 MG: 20 INJECTION, SOLUTION EPIDURAL; INFILTRATION; INTRACAUDAL; PERINEURAL at 12:16

## 2019-05-14 RX ADMIN — Medication 1 AMPULE: at 21:08

## 2019-05-14 RX ADMIN — Medication 10 ML: at 13:33

## 2019-05-14 RX ADMIN — ONDANSETRON 4 MG: 2 INJECTION INTRAMUSCULAR; INTRAVENOUS at 21:12

## 2019-05-14 RX ADMIN — ONDANSETRON 4 MG: 2 INJECTION INTRAMUSCULAR; INTRAVENOUS at 12:41

## 2019-05-14 RX ADMIN — RIVAROXABAN 15 MG: 15 TABLET, FILM COATED ORAL at 08:55

## 2019-05-14 RX ADMIN — Medication 1 AMPULE: at 08:55

## 2019-05-14 RX ADMIN — FENTANYL CITRATE 50 MCG: 50 INJECTION, SOLUTION INTRAMUSCULAR; INTRAVENOUS at 16:09

## 2019-05-14 RX ADMIN — NAFCILLIN SODIUM 12 G: 2 INJECTION, POWDER, LYOPHILIZED, FOR SOLUTION INTRAMUSCULAR; INTRAVENOUS at 21:06

## 2019-05-14 RX ADMIN — MIDAZOLAM HYDROCHLORIDE 2 MG: 1 INJECTION, SOLUTION INTRAMUSCULAR; INTRAVENOUS at 16:07

## 2019-05-14 RX ADMIN — Medication 5 ML: at 05:21

## 2019-05-14 RX ADMIN — PROPOFOL 200 MG: 10 INJECTION, EMULSION INTRAVENOUS at 12:16

## 2019-05-14 RX ADMIN — ROCURONIUM BROMIDE 50 MG: 10 INJECTION, SOLUTION INTRAVENOUS at 12:16

## 2019-05-14 RX ADMIN — Medication 5 ML: at 21:08

## 2019-05-14 RX ADMIN — FENTANYL CITRATE 50 MCG: 50 INJECTION, SOLUTION INTRAMUSCULAR; INTRAVENOUS at 16:05

## 2019-05-14 RX ADMIN — MIDAZOLAM HYDROCHLORIDE 2 MG: 1 INJECTION, SOLUTION INTRAMUSCULAR; INTRAVENOUS at 06:09

## 2019-05-14 RX ADMIN — SODIUM CHLORIDE, SODIUM LACTATE, POTASSIUM CHLORIDE, CALCIUM CHLORIDE: 600; 310; 30; 20 INJECTION, SOLUTION INTRAVENOUS at 12:16

## 2019-05-14 RX ADMIN — RIVAROXABAN 15 MG: 15 TABLET, FILM COATED ORAL at 18:16

## 2019-05-14 RX ADMIN — MIDAZOLAM HYDROCHLORIDE 2 MG: 1 INJECTION, SOLUTION INTRAMUSCULAR; INTRAVENOUS at 16:05

## 2019-05-14 RX ADMIN — FENTANYL CITRATE 50 MCG: 50 INJECTION, SOLUTION INTRAMUSCULAR; INTRAVENOUS at 13:15

## 2019-05-14 NOTE — PROGRESS NOTES
Throat numbed at this time. MD at bedside ar [phone].   Pt sedated per MD.  See [facility]

## 2019-05-14 NOTE — PROGRESS NOTES
TRANSFER - IN REPORT: 
 
Verbal report received from Rolf Babcock RN on Jovi Wilson  being received from PACU for routine progression of care Report consisted of patients Situation, Background, Assessment and  
Recommendations(SBAR). Information from the following report(s) SBAR, Procedure Summary, Intake/Output, MAR and Recent Results was reviewed with the receiving nurse. Opportunity for questions and clarification was provided. Assessment to be completed upon patients arrival to unit and care assumed.

## 2019-05-14 NOTE — PROGRESS NOTES
Patient cussing at nurses about not getting a diet. Nurse provided education about reason for NPO order. Patient did not respond.

## 2019-05-14 NOTE — PROGRESS NOTES
Vancomycin being held until IV team can come insert a new IV due to Nafcillin running in current IV. MD made aware.

## 2019-05-14 NOTE — PROGRESS NOTES
TRANSFER - IN REPORT: 
 
Verbal report received from PRABHA Cano on Julieann Meckel  being received from [facility] for routine progression of care Report consisted of patients Situation, Background, Assessment and  
Recommendations(SBAR). Information from the following report(s) Procedure Summary, MAR and Recent Results was reviewed with the receiving nurse. Patient was given Versed and Fentanyl during procedure. Will be able to eat at 1755. No diet orders noted. Opportunity for questions and clarification was provided. Assessment to be completed upon patients arrival to unit and care assumed.

## 2019-05-14 NOTE — PROGRESS NOTES
Spoke with Dr. Saintclair Shope. Will complete remainder of LR when patient gets to the floor and then discontinue and place patient back on scheduled Nafcillin. Will call Cardiology to see if the patient can eat or if another test will be ordered for today.

## 2019-05-14 NOTE — PROGRESS NOTES
[facility] CARDIOLOGY PROGRESS NOTE 
      
 
5/14/2019 5:37 PM 
 
Admit Date: 5/9/2019 Subjective:  
 
No o/e; in SR. Status issues with back pain ROS: 
Cardiovascular:  As noted above Objective:  
  
Vitals:  
 05/14/19 1634 05/14/19 1636 05/14/19 1639 05/14/19 1656 BP: 103/65 105/68 99/64 111/75 Pulse: 94 94 92 60 Resp: 12 11 17 19 Temp:    98.3 °F (36.8 °C) SpO2: 98% 98% 98% 92% Weight:      
Height:      
 
 
Physical Exam: 
General-No Acute Distress Neck- supple, no JVD 
CV- regular rate and rhythm no MRG Lung- clear bilaterally Abd- soft, nontender, nondistended Ext- no edema bilaterally. Skin- warm and dry Data Review:  
Recent Labs 05/12/19 
1532 K 3.2* Assessment/Plan:  
 
Principal Problem: 
  Severe sepsis ([address] (5/9/2019) Active Problems: 
  IVDU (intravenous drug user) (2/1/2019) UTI (urinary tract infection) (5/9/2019) History of pulmonary embolism (5/9/2019) Hypokalemia (5/9/2019) TC with no evidence for cardiac source of bacteremia. Abx per ID. Will sign off. Please call for questions Nan Graves MD 
5/14/2019 5:37 PM

## 2019-05-14 NOTE — PROGRESS NOTES
Pt arrived to [address] [place] [address]. Pt placed on monitor and prepped for TC. Consent and pregnancy questionnaire obtained.

## 2019-05-14 NOTE — ANESTHESIA POSTPROCEDURE EVALUATION
Procedure(s): MRI ANESTHESIA/ THORACIC SPINE WITH AND WITHOUT CONTRAST/ CERVICAL SPINE WITH AND WITHOUT CONTRAST/ LUMBAR WITH AND WITHOUT CONTRAST/ 816. general 
 
Anesthesia Post Evaluation Patient location during evaluation: PACU Patient participation: complete - patient participated Level of consciousness: awake Pain management: satisfactory to patient Airway patency: patent Anesthetic complications: no 
Cardiovascular status: hemodynamically stable Respiratory status: spontaneous ventilation Hydration status: euvolemic Post anesthesia nausea and vomiting:  none Vitals Value Taken Time /60 5/14/2019  2:45 PM  
Temp 37.8 °C (100 °F) 5/14/2019  2:22 PM  
Pulse 105 5/14/2019  2:46 PM  
Resp 16 5/14/2019  2:45 PM  
SpO2 93 % 5/14/2019  2:46 PM  
Vitals shown include unvalidated device data.

## 2019-05-14 NOTE — H&P
Patient: Avis Louise MRN: [id number]  SSN: [id number] YOB: 1985  Age: 26 y.o. Sex: female History and Physical 
 
Avis Louise is a 26 y.o. female having Procedure(s): MRI ANESTHESIA/ THORACIC SPINE WITH AND WITHOUT CONTRAST/ CERVICAL SPINE WITH AND WITHOUT CONTRAST/ LUMBAR WITH AND WITHOUT CONTRAST/ 816. Allergies: No Known Allergies Chief Complaint: Chills, body aches. History of Present Illness: Pt with h/o IVDU with several day h/o chills and body aches. Concern for sepsis. History reviewed. No pertinent past medical history. Past Surgical History:  
Procedure Laterality Date  HX  SECTION    
 HX GYN    
 HX ORTHOPAEDIC  2018  
 left arm  HX TUBAL LIGATION Bilateral   
  
History reviewed. No pertinent family history. Social History Tobacco Use  Smoking status: Never Smoker  Smokeless tobacco: Never Used Substance Use Topics  Alcohol use: No  
  Frequency: Never Prior to Admission medications Medication Sig Start Date End Date Taking? Authorizing Provider  
rivaroxaban (XARELTO) 15 mg (42)- 20 mg (9) DsPk Take one 15 mg tablet twice a day with food for the first 21 days. Then, take one 20 mg tablet once a day with food for 9 days. 3/30/19   Michel Licea MD  
  
 
Visit Vitals /71 Pulse 96 Temp 37.2 °C (98.9 °F) Resp 18 Ht 5' 3\" (1.6 m) Wt 75.5 kg (166 lb 6.4 oz) SpO2 95% Breastfeeding? Yes  
BMI 29.48 kg/m² Assessment and Plan:  
Avis Louise is a 26 y.o. female having Procedure(s): MRI ANESTHESIA/ THORACIC SPINE WITH AND WITHOUT CONTRAST/ CERVICAL SPINE WITH AND WITHOUT CONTRAST/ LUMBAR WITH AND WITHOUT CONTRAST/ 816 for chills, body aches, concern for sepsis. Preanesthesia Evaluation Last edited 19 1228 by Sara Arevalo MD 
Date of Service 19 1028 Relevant Problems No relevant active problems Anesthetic History No history of anesthetic complications Review of Systems / Medical History Pertinent labs reviewed Pulmonary Within defined limits Comments: H/o PE Neuro/Psych Within defined limits Cardiovascular Within defined limits Exercise tolerance: >4 METS 
  
GI/Hepatic/Renal 
Within defined limits Endo/Other Obesity and anemia Other Findings Comments: IVDU- last use 1 wk ago Denies EtOH Physical Exam 
 
Airway Mallampati: II 
TM Distance: 4 - 6 cm Neck ROM: normal range of motion Mouth opening: Normal 
 
 Cardiovascular Regular rate and rhythm,  S1 and S2 normal,  no murmur, click, rub, or gallop Dental 
 
Dentition: Poor dentition Comments: Multiple cracked teeth with numerous caries. Pulmonary Breath sounds clear to auscultation Abdominal 
GI exam deferred Other Findings Anesthetic Plan ASA: 3 Anesthesia type: general 
 
 
 
 
Induction: Intravenous Anesthetic plan and risks discussed with: Patient GETA. Preanesthesia evaluation performed by Elizabeth Bautista MD  
 Revision History Date/Time User Provider Type Action  
 > 05/14/19 1228 Elizabeth Bautista MD Anesthesiologist Addend 05/14/19 1059 Elizabeth Bautista MD Anesthesiologist Addend 05/14/19 1028 Elizabeth Bautista MD Anesthesiologist Addend 05/14/19 1028 Elizabeth Bautista MD Anesthesiologist Sign Signed By: Srinivas Reddy MD   
 May 14, 2019

## 2019-05-14 NOTE — PROGRESS NOTES
Spoke with Bernadine Luo, NP with [facility] Cardiology. Patient is scheduled for TC today so patient will need to remain NPO when she returns to the unit. Will continue to monitor.

## 2019-05-14 NOTE — PROGRESS NOTES
Patient remains in stable condition with respirations even/unlabored. No acute distress noted. No needs noted or voiced at this time. Safety measures in place. Nafcillin running at MD ordered rate. patient refuses to wear nasal cannula. Call light remains within reach. Preparing to give report to oncoming shift.

## 2019-05-14 NOTE — PROGRESS NOTES
REINIER recevied from [facility]. Patient remains in stable condition with respirations even/unlabored. No acute distress noted at this time. Patient is on room air. Telemetry noted. Patient laying in bed with no sheet/blanket and states \"They haven't given me any water last night 'cause the nurses last night sucked. \" Patient is scheduled for MRI of spine today. Will check on diet orders and notify CNA. Call light remains within reach, patient encouraged to call nurse prn assist. Will continue to monitor per policy.

## 2019-05-14 NOTE — PERIOP NOTES
TRANSFER - OUT REPORT: 
 
Verbal report given to Esa Alfaro (name) on Dwayne Buchanan  being transferred to 816(unit) for routine post - op Report consisted of patients Situation, Background, Assessment and  
Recommendations(SBAR). Information from the following report(s) Procedure Summary, Intake/Output and MAR was reviewed with the receiving nurse. Lines:  
Peripheral IV 05/13/19 Upper;Right Arm (Active) Site Assessment Clean, dry, & intact 5/14/2019  2:18 PM  
Phlebitis Assessment 0 5/14/2019  2:18 PM  
Infiltration Assessment 0 5/14/2019  2:18 PM  
Dressing Status Clean, dry, & intact 5/14/2019  2:18 PM  
Dressing Type Transparent;Tape 5/14/2019  2:18 PM  
Hub Color/Line Status Infusing;Green 5/14/2019  2:18 PM  
Alcohol Cap Used No 5/14/2019  2:18 PM  
  
 
Opportunity for questions and clarification was provided. Patient transported with: 
 O2 @ 2 liters VTE prophylaxis orders have not been written for Dwayne Buchanan. Patient and family given floor number and nurses name. Family updated re: pt status after security code verified.

## 2019-05-14 NOTE — PROGRESS NOTES
Patient has arrived to the floor from [address] [facility]. Patient is drowsy but rouses easily to verbal stimulus. Patient asking for food. Dr. Almaguer Found called to report that TC was negative and patient could be placed back on regular diet after she is able to eat again at 1755. Patient has been notified that she will be able to eat at that time and tray has been ordered. No other need noted/voiced at this time. Assessment completed. Patient respirations even/unlabored. No acute distress noted. RR = 19 per minute. Will continue to monitor.

## 2019-05-14 NOTE — PROGRESS NOTES
Pharmacy contacted after unable to get another IV line. Pharmacy says to stop Nafcillin, run the vanc and then restart Nafcillin.

## 2019-05-14 NOTE — PROGRESS NOTES
Pt resting in bed. Respirations even and unlabored on room air. IV Nafcillin infusing. Pt denies pain at this time. Call light within reach, bed low and locked. Will give report to oncoming RN.

## 2019-05-14 NOTE — PROGRESS NOTES
HOSPITALIST 
NAME:  Julieann Meckel Age:  26 y.o. 
:   1985 MRN:   [id number] PCP: None Consulting MD: Treatment Team: Attending Provider: Jaky De MD; Care Manager: Ramila Rivas RN; Utilization Review: Joey Cormier RN; Consulting Provider: Leslie Maynard MD; Consulting Provider: Bryanna Garland MD; Consulting Provider: Bri Hughes MD 
 
CHIEF COMPLAINT: chills, body aches 
 
subj  
 
a 26 y.o. female with a past medical history of IV drug use who presents to the ER with report of 3 days of feeling chills and body aches all over. She denies any shana fevers, nausea, vomiting, dysuria, cough, congestion. Today, no fever, no ac events Objective:  
 
Visit Vitals /71 Pulse 96 Temp 98.9 °F (37.2 °C) Resp 18 Ht 5' 3\" (1.6 m) Wt 75.5 kg (166 lb 6.4 oz) SpO2 95% Breastfeeding? Yes  
BMI 29.48 kg/m² Temp (24hrs), Av.5 °F (37.5 °C), Min:98.8 °F (37.1 °C), Max:100.8 °F (38.2 °C) Oxygen Therapy O2 Sat (%): 95 % (19 0743) Pulse via Oximetry: 119 beats per minute (19 2121) O2 Device: Room air (19 5814) Physical Exam: 
General:    AAO3 Lungs:   Clear to auscultation bilaterally Heart:   Regular rate and rhythm, without murmurs, rubs, or gallops. Abdomen:   Soft, non-tender, non-distended with normoactive bowel sounds. Genitourinary: No tenderness over the bladder or bilateral CVAs. Extremities: Without clubbing, cyanosis, or edema. Skin:     Normal color, texture, and turgor. No rashes, lesions, or jaundice. Neurologic: grossly intact Data Review: No results found for this or any previous visit (from the past 24 hour(s)). Imaging Alfornia Alexis /Studies: No results found. Assessment and Plan: 1- Bacteremia, MSSA w/ L-T septic arthritis dt IVD use 2- Ac UTI, urine E coli [facility] Follow blood CS final results, repeat Cs positive ID and card consulted Generalized pain per pt is tolerable w/ out narcotics K replacement as needed Dispo: need long term hospitalization for IV abx Signed By: Orlin Dhillon MD   
 May 14, 2019

## 2019-05-14 NOTE — ANESTHESIA PREPROCEDURE EVALUATION
Relevant Problems No relevant active problems Anesthetic History No history of anesthetic complications Review of Systems / Medical History Pertinent labs reviewed Pulmonary Within defined limits Comments: H/o PE Neuro/Psych Within defined limits Cardiovascular Within defined limits Exercise tolerance: >4 METS 
  
GI/Hepatic/Renal 
Within defined limits Endo/Other Obesity and anemia Other Findings Comments: IVDU- last use 1 wk ago Denies EtOH Physical Exam 
 
Airway Mallampati: II 
TM Distance: 4 - 6 cm Neck ROM: normal range of motion Mouth opening: Normal 
 
 Cardiovascular Regular rate and rhythm,  S1 and S2 normal,  no murmur, click, rub, or gallop Dental 
 
Dentition: Poor dentition Comments: Multiple cracked teeth with numerous caries. Pulmonary Breath sounds clear to auscultation Abdominal 
GI exam deferred Other Findings Anesthetic Plan ASA: 3 Anesthesia type: general 
 
 
 
 
Induction: Intravenous Anesthetic plan and risks discussed with: Patient ANAMGaro

## 2019-05-14 NOTE — PROGRESS NOTES
TRANSFER - OUT REPORT: 
 
Verbal report given to MATTIE BLOOM RN(name) on Breana Bright  being transferred to 8th floor(unit) for routine progression of care Report consisted of patients Situation, Background, Assessment and  
Recommendations(SBAR). Information from the following report(s) Procedure Summary was reviewed with the receiving nurse. Opportunity for questions and clarification was provided.

## 2019-05-14 NOTE — PROGRESS NOTES
Infectious Disease Progress Note Today's Date: 2019 Admit Date: 2019 Impression: · MSSA bacteremia, R-clindamycin, (, ), repeat culture  pending · Back pain Plan: · Discontinue vancomycin. Continue nafcillin 12 gm/day via continuous infusion. · TTE negative; request TC. · MRI planned for this morning for her spine with concern for possible abscess; she will need sedation. Anti-infectives: · Nafcillin ( - 
· Vancomycin (, ) · Ceftriaxone ( - 5/10, -) · Zosyn () Subjective: Interval history: Tmax 100.8. WBCs 20.2K, she is complaining of significant back pain, LE weakness, urge incontinence, and bilateral shoulder pain. She denies nausea, vomiting. No Known Allergies Review of Systems:  A comprehensive review of systems was negative except for that written in the History of Present Illness. Objective:  
 
Visit Vitals /71 Pulse 96 Temp 98.9 °F (37.2 °C) Resp 18 Ht 5' 3\" (1.6 m) Wt 75.5 kg (166 lb 6.4 oz) SpO2 95% Breastfeeding? Yes  
BMI 29.48 kg/m² Temp (24hrs), Av.3 °F (37.4 °C), Min:98.2 °F (36.8 °C), Max:100.8 °F (38.2 °C) Lines:  Peripheral IV:    
 
Physical Exam:   
General:  Alert, cooperative, well developed, appears stated age, appears uncomfortable Eyes:  Sclera anicteric, not injected and no drainage Mouth/Throat: Mucous membranes normal, poor dentition Neck: Supple, midline trachea Lungs:   Clear anterior lungs without increased work of breathing CV:  Regular rate and rhythm, no audible murmur Abdomen:   Soft, non tender, non distended, active bowel sounds Extremities: No cyanosis or edema Skin: Multiple tattoos and piercings, no rash Musculoskeletal: No joint effusions, bilateral shoulders tender to palpation Lines/Devices:  Intact, no erythema, drainage or tenderness Neuro:  Intact motor function LEs Psych: Alert and oriented, appropriate Data Review: CBC: 
No results for input(s): WBC, GRANS, MONOS, EOS, ANEU, ABL, HGB, HCT, PLT, HGBEXT, HCTEXT, PLTEXT, HGBEXT, HCTEXT, PLTEXT in the last 72 hours. No lab exists for component: LYMPHS,  MAILE BMP: 
Recent Labs 05/12/19 
9803 K 3.2*  
 
 
LFTS: 
No results for input(s): TBILI, ALT, SGOT, AP, TP, ALB in the last 72 hours. Microbiology:  
 
All Micro Results Procedure Component Value Units Date/Time CULTURE, BLOOD [[id number]] Collected:  05/14/19 7297 Order Status:  Completed Specimen:  Blood Updated:  05/14/19 3076 CULTURE, BLOOD [[id number]]  (Abnormal) Collected:  05/11/19 0854 Order Status:  Completed Specimen:  Blood Updated:  05/14/19 0719 Special Requests: --     
  RIGHT 
ARM 
  
  GRAM STAIN    
  GRAM POS COCCI IN CLUSTERS  
     
   PEDIATRIC BOTTLE     
      
  CRITICAL RESULT NOT CALLED DUE TO PREVIOUS NOTIFICATION OF CRITICAL RESULT WITHIN THE LAST 24 HOURS. Culture result: STAPHYLOCOCCUS AUREUS     
   FOR SUSCEPTIBILITY REFER TO [id number] CULTURE, BLOOD [[id number]]  (Abnormal) Collected:  05/11/19 0854 Order Status:  Completed Specimen:  Blood Updated:  05/14/19 3547 Special Requests: --     
  LEFT 
HAND 
  
  GRAM STAIN GRAM POSITIVE COCCI     
   PEDIATRIC BOTTLE RESULTS VERIFIED, PHONED TO AND READ BACK BY Josey Agrawal @4174 05/12/2019 [facility] Culture result: STAPHYLOCOCCUS AUREUS     
   FOR SUSCEPTIBILTIY REFER TO [address] NO [id number] CULTURE, BLOOD [[id number]]  (Abnormal) Collected:  05/09/19 1822 Order Status:  Completed Specimen:  Whole Blood Updated:  05/14/19 6469 Special Requests: --     
  RIGHT 
FOREARM 
  
  GRAM STAIN    
  GRAM POS COCCI IN CLUSTERS  
     
      
  AEROBIC AND ANAEROBIC BOTTLES  
     
      
  CRITICAL RESULT NOT CALLED DUE TO PREVIOUS NOTIFICATION OF CRITICAL RESULT WITHIN THE LAST 24 HOURS.   
     
  Culture result: STAPHYLOCOCCUS AUREUS     
 FOR SUSCEPTIBILITY REFER TO [facility] NO [id number] CULTURE, BLOOD [[id number]]  (Abnormal)  (Susceptibility) Collected:  05/09/19 1903 Order Status:  Completed Specimen:  Whole Blood Updated:  05/14/19 1389 Special Requests: --     
  RIGHT Antecubital 
  
  GRAM STAIN    
  GRAM POS COCCI IN CLUSTERS  
     
      
  AEROBIC AND ANAEROBIC BOTTLES RESULTS VERIFIED, PHONED TO AND READ BACK BY Nigel Salvador, RN @ 6336 ON 5/10/19 BY ADS Culture result: STAPHYLOCOCCUS AUREUS     
      
  MRSA target DNA sequences not detected: SA target DNA sequence detected within the sample. Test performed by PCR  Culture/Sensitivity to follow CULTURE, URINE [[id number]]  (Abnormal)  (Susceptibility) Collected:  05/09/19 2027 Order Status:  Completed Specimen:  Urine from Clean catch Updated:  05/13/19 4588 Special Requests: NO SPECIAL REQUESTS Culture result:    
  >100,000 COLONIES/mL ESCHERICHIA COLI  
     
      
  10,000 to 50,000 COLONIES/mL STAPHYLOCOCCUS AUREUS Imaging: MRI pending CXR 5/13/19 IMPRESSION: New faint infiltrate right base, possibly lobar pneumonia. Left lung 
infiltrate is cleared. Signed By: Vandana Patino NP May 14, 2019

## 2019-05-14 NOTE — PERIOP NOTES
Pt took her nasal cannula off and her O2 saturation dropped from 96 % to 84% on ROOM AIR. Instructed patient that she needed to wear her oxygen cannula since her oxygen dropped so low on room air. Patient states \"I dont want to wear it, I dont need it, it annoys me\". Educated patient on the need for wearing oxygen at this time patient placed back on cannula at this time.

## 2019-05-14 NOTE — PROGRESS NOTES
Kathe Cast from cath lab here to get patient. Patient is still in PACU, transport has not yet picked them up. Kathe Cast has called and requested patient to remain, he will get her from PACU and take her directly to [facility] for her TC.

## 2019-05-15 PROCEDURE — 65270000029 HC RM PRIVATE

## 2019-05-15 PROCEDURE — 74011250637 HC RX REV CODE- 250/637: Performed by: HOSPITALIST

## 2019-05-15 PROCEDURE — 74011250636 HC RX REV CODE- 250/636: Performed by: FAMILY MEDICINE

## 2019-05-15 PROCEDURE — 74011000250 HC RX REV CODE- 250: Performed by: INTERNAL MEDICINE

## 2019-05-15 PROCEDURE — 97162 PT EVAL MOD COMPLEX 30 MIN: CPT

## 2019-05-15 PROCEDURE — 74011250637 HC RX REV CODE- 250/637: Performed by: FAMILY MEDICINE

## 2019-05-15 PROCEDURE — 65660000000 HC RM CCU STEPDOWN

## 2019-05-15 PROCEDURE — 74011250636 HC RX REV CODE- 250/636: Performed by: INTERNAL MEDICINE

## 2019-05-15 RX ORDER — TRAMADOL HYDROCHLORIDE 50 MG/1
50 TABLET ORAL
Status: DISCONTINUED | OUTPATIENT
Start: 2019-05-15 | End: 2019-06-02 | Stop reason: HOSPADM

## 2019-05-15 RX ADMIN — NAFCILLIN SODIUM 12 G: 2 INJECTION, POWDER, LYOPHILIZED, FOR SOLUTION INTRAMUSCULAR; INTRAVENOUS at 21:59

## 2019-05-15 RX ADMIN — RIVAROXABAN 15 MG: 15 TABLET, FILM COATED ORAL at 17:18

## 2019-05-15 RX ADMIN — TRAMADOL HYDROCHLORIDE 50 MG: 50 TABLET, FILM COATED ORAL at 23:38

## 2019-05-15 RX ADMIN — TRAMADOL HYDROCHLORIDE 50 MG: 50 TABLET, FILM COATED ORAL at 09:11

## 2019-05-15 RX ADMIN — Medication 5 ML: at 04:17

## 2019-05-15 RX ADMIN — ONDANSETRON 4 MG: 2 INJECTION INTRAMUSCULAR; INTRAVENOUS at 06:25

## 2019-05-15 RX ADMIN — Medication 1 AMPULE: at 21:58

## 2019-05-15 RX ADMIN — ACETAMINOPHEN 650 MG: 325 TABLET, FILM COATED ORAL at 12:07

## 2019-05-15 RX ADMIN — Medication 10 ML: at 22:00

## 2019-05-15 RX ADMIN — Medication 5 ML: at 15:34

## 2019-05-15 RX ADMIN — RIVAROXABAN 15 MG: 15 TABLET, FILM COATED ORAL at 08:40

## 2019-05-15 RX ADMIN — TRAMADOL HYDROCHLORIDE 50 MG: 50 TABLET, FILM COATED ORAL at 15:31

## 2019-05-15 NOTE — PROGRESS NOTES
Patient remains in stable condition with respirations even/unlabored. No acute distress noted. No needs noted or voiced at this time. Safety measures in place. Patient is on room air. Nafcillin infusing at MD ordered rate. Call light remains within reach. Preparing to give report to oncoming shift.

## 2019-05-15 NOTE — PROGRESS NOTES
HOSPITALIST 
NAME:  Vikash Becerra Age:  26 y.o. 
:   1985 MRN:   [id number] PCP: None Consulting MD: Treatment Team: Attending Provider: Jessica Shahid MD; Care Manager: Amelie Jiang RN; Utilization Review: Yoanna Haro RN; Consulting Provider: Deyanira Rodriges MD; Consulting Provider: John Carter MD; Physical Therapist: Sarah Penaloza PT 
 
CHIEF COMPLAINT: chills, body aches 
 
subj  
 
a 26 y.o. female with a past medical history of IV drug use who presents to the ER with report of 3 days of feeling chills and body aches all over. She denies any shana fevers, nausea, vomiting, dysuria, cough, congestion. Today, no fever, no ac events, localizing some lower back pain Objective:  
 
Visit Vitals /74 Pulse (!) 104 Temp 100.4 °F (38 °C) Resp 16 Ht 5' 3\" (1.6 m) Wt 78.5 kg (173 lb 1.6 oz) SpO2 99% Breastfeeding? Yes  
BMI 30.66 kg/m² Temp (24hrs), Av.6 °F (37.6 °C), Min:98.3 °F (36.8 °C), Max:100.4 °F (38 °C) Oxygen Therapy O2 Sat (%): 99 % (05/15/19 1048) Pulse via Oximetry: 93 beats per minute (19 1639) O2 Device: Room air (05/15/19 0727) O2 Flow Rate (L/min): 2 l/min (19 1455) Physical Exam: 
General:    AAO3 Lungs:   Clear to auscultation bilaterally Heart:   Regular rate and rhythm, without murmurs, rubs, or gallops. Abdomen:   Soft, non-tender, non-distended with normoactive bowel sounds. Genitourinary: No tenderness over the bladder or bilateral CVAs. Extremities: Without clubbing, cyanosis, or edema. Skin:     Normal color, texture, and turgor. No rashes, lesions, or jaundice. Neurologic: grossly intact Data Review: No results found for this or any previous visit (from the past 24 hour(s)). Imaging Reeda Mendez /Studies: 
Mri Cerv Spine W Cont Result Date: 2019 IMPRESSION: 1. Mild spinal stenosis at C2-C3 and C3-C4. 2. Mild to moderate spinal stenosis at C4-C5. [address] Result Date: 5/14/2019 IMPRESSION: 1. Bilateral pleural effusions with cavitary lung lesions. Correlate with pulmonary imaging. 2. No significant thoracic spinal stenosis or findings of discitis. 2. Edema-like signal around the right-sided articular facet at T12-L1. Facet arthritis including septic arthritis could account for this finding. See MRI lumbar spine for further description. [address] Result Date: 5/15/2019 IMPRESSION: 1. Abnormal enhancement posterior to the articular facet joints at the T12-L1, L3-L4 and L4-L5 levels. Findings are concerning for septic facet arthritis. See above description. Similar findings are present around the right SI joint which is likely infected as well. 2. Minimal enhancement along the inferior endplate of L1. This may be degenerative in etiology. Consider short follow-up MR imaging of the lumbar spine in several weeks for further assessment. Assessment and Plan: 1- Bacteremia, MSSA w/ L-T septic arthritis dt IVD use 2- Ac UTI, urine E coli Rx: On [address] [facility] per ID Follow blood CS final results, repeat Cs positive Ultram 
PT consulted Generalized pain per pt is tolerable w/ out strong narcotics K replacement as needed Dispo: need long term hospitalization for IV abx Signed By: Juan C Davis MD   
 May 15, 2019

## 2019-05-15 NOTE — PROGRESS NOTES
REINIER recevied from [address]. Patient remains in stable condition with respirations even/unlabored. No acute distress noted at this time. IV Abx running at MD ordered rate. Patient on room air. Call light remains within reach, patient encouraged to call nurse prn assist. Will continue to monitor per policy.

## 2019-05-15 NOTE — PROGRESS NOTES
Infectious Disease Progress Note Today's Date: 5/15/2019 Admit Date: 2019 Impression: · MSSA bacteremia, R-clindamycin, (, ), repeat culture  pending; TTE, TC negative · Back pain: MRI with concern for septic facet arthritis T12-L5 · Pulmonary cavitary lesions · Hep C ab positive (2019) Plan:  
· Continue nafcillin 12 gm/day via continuous infusion. She will need a long course of treatment. · Recommend imaging to better evaluate cavitary lung lesions. Anti-infectives: · Nafcillin ( - 
· Vancomycin (, ) · Ceftriaxone ( - 5/10, -) · Zosyn () Subjective: Interval history: MRI concerning for septic facet arthritis T12-L5. Tmax 100.4. MRI showed cavitary lung lesions. She denies history of homelessness or incarceration. Complains of back pain, nausea, chills and fatigue. Denies fevers, sweats or shortness of breath. No Known Allergies Review of Systems:  A comprehensive review of systems was negative except for that written in the History of Present Illness. Objective:  
 
Visit Vitals /74 Pulse (!) 104 Temp 100.4 °F (38 °C) Resp 16 Ht 5' 3\" (1.6 m) Wt 78.5 kg (173 lb 1.6 oz) SpO2 99% Breastfeeding? Yes  
BMI 30.66 kg/m² Temp (24hrs), Av.6 °F (37.6 °C), Min:98.3 °F (36.8 °C), Max:100.4 °F (38 °C) Lines:  Peripheral IV:    
 
Physical Exam:   
General:  Cooperative, no acute distress, appears uncomfortable and appears stated age Eyes:  Sclera anicteric, not injected and no drainage Mouth/Throat: Mucous membranes normal, poor dentition Neck: Supple, midline trachea Lungs:   Posterior lung fields clear without audible wheezes CV:  Regular rate and rhythm, no audible murmur Abdomen:   Soft, non tender, non distended, active bowel sounds Extremities: No cyanosis or edema Skin: Multiple tattoos and piercings, no rash Musculoskeletal: No joint effusions, bilateral shoulders tender to palpation Lines/Devices:  Intact, no erythema, drainage or tenderness Neuro:  Intact motor function LEs Psych: Alert and oriented, appropriate Data Review: CBC: 
No results for input(s): WBC, GRANS, MONOS, EOS, ANEU, ABL, HGB, HCT, PLT, HGBEXT, HCTEXT, PLTEXT, HGBEXT, HCTEXT, PLTEXT in the last 72 hours. No lab exists for component: LYMPHS,  MAILE BMP: 
No results for input(s): CREA, BUN, NA, K, CL, CO2, AGAP, GLU in the last 72 hours. LFTS: 
No results for input(s): TBILI, ALT, SGOT, AP, TP, ALB in the last 72 hours. Microbiology:  
 
All Micro Results Procedure Component Value Units Date/Time CULTURE, BLOOD [[id number]] Collected:  05/14/19 0653 Order Status:  Completed Specimen:  Blood Updated:  05/15/19 1046 Special Requests: LEFT HAND Culture result: NO GROWTH 1 DAY     
 CULTURE, BLOOD [[id number]]  (Abnormal) Collected:  05/11/19 0854 Order Status:  Completed Specimen:  Blood Updated:  05/14/19 0719 Special Requests: --     
  RIGHT 
ARM 
  
  GRAM STAIN    
  GRAM POS COCCI IN CLUSTERS  
     
   PEDIATRIC BOTTLE     
      
  CRITICAL RESULT NOT CALLED DUE TO PREVIOUS NOTIFICATION OF CRITICAL RESULT WITHIN THE LAST 24 HOURS. Culture result: STAPHYLOCOCCUS AUREUS     
   FOR SUSCEPTIBILITY REFER TO [id number] CULTURE, BLOOD [[id number]]  (Abnormal) Collected:  05/11/19 0854 Order Status:  Completed Specimen:  Blood Updated:  05/14/19 8871 Special Requests: --     
  LEFT 
HAND 
  
  GRAM STAIN GRAM POSITIVE COCCI     
   PEDIATRIC BOTTLE RESULTS VERIFIED, PHONED TO AND READ BACK BY Libby Vargas @9214 05/12/2019 [facility] Culture result: STAPHYLOCOCCUS AUREUS     
   FOR SUSCEPTIBILTIY REFER TO [facility] NO [id number] CULTURE, BLOOD [[id number]]  (Abnormal) Collected:  05/09/19 1822 Order Status:  Completed Specimen:  Whole Blood Updated:  05/14/19 5582   Special Requests: --     
  RIGHT 
FOREARM 
  
  GRAM STAIN    
 GRAM POS COCCI IN CLUSTERS  
     
      
  AEROBIC AND ANAEROBIC BOTTLES  
     
      
  CRITICAL RESULT NOT CALLED DUE TO PREVIOUS NOTIFICATION OF CRITICAL RESULT WITHIN THE LAST 24 HOURS. Culture result: STAPHYLOCOCCUS AUREUS     
   FOR SUSCEPTIBILITY REFER TO [facility] NO [id number] CULTURE, BLOOD [[id number]]  (Abnormal)  (Susceptibility) Collected:  05/09/19 1903 Order Status:  Completed Specimen:  Whole Blood Updated:  05/14/19 5676 Special Requests: --     
  RIGHT Antecubital 
  
  GRAM STAIN    
  GRAM POS COCCI IN CLUSTERS  
     
      
  AEROBIC AND ANAEROBIC BOTTLES RESULTS VERIFIED, PHONED TO AND READ BACK BY Adam Gordon RN @ 0584 ON 5/10/19 BY ADS Culture result: STAPHYLOCOCCUS AUREUS     
      
  MRSA target DNA sequences not detected: SA target DNA sequence detected within the sample. Test performed by PCR  Culture/Sensitivity to follow CULTURE, URINE [[id number]]  (Abnormal)  (Susceptibility) Collected:  05/09/19 2027 Order Status:  Completed Specimen:  Urine from Clean catch Updated:  05/13/19 6800 Special Requests: NO SPECIAL REQUESTS Culture result:    
  >100,000 COLONIES/mL ESCHERICHIA COLI  
     
      
  10,000 to 50,000 COLONIES/mL STAPHYLOCOCCUS AUREUS Imaging:  
Reviewed: MRI 5/14/19 C-spine IMPRESSION: 
1. Mild spinal stenosis at C2-C3 and C3-C4. 2. Mild to moderate spinal stenosis at C4-C5. T-spine IMPRESSION: 
  
1. Bilateral pleural effusions with cavitary lung lesions. Correlate with 
pulmonary imaging. 
  
2. No significant thoracic spinal stenosis or findings of discitis. 
  
2. Edema-like signal around the right-sided articular facet at T12-L1. Facet arthritis including septic arthritis could account for this finding. See MRI lumbar spine for further description. L-spine MPRESSION: 
1.  Abnormal enhancement posterior to the articular facet joints at the T12-L1, 
 L3-L4 and L4-L5 levels. Findings are concerning for septic facet arthritis. See 
above description. Similar findings are present around the right SI joint which 
is likely infected as well. 
  
2. Minimal enhancement along the inferior endplate of L1. This may be 
degenerative in etiology. Consider short follow-up MR imaging of the lumbar 
spine in several weeks for further assessment. CXR 5/13/19 IMPRESSION: New faint infiltrate right base, possibly lobar pneumonia. Left lung 
infiltrate is cleared. Signed By: Karina Mendoza NP May 15, 2019

## 2019-05-15 NOTE — PROGRESS NOTES
Problem: Mobility Impaired (Adult and Pediatric) Goal: *Acute Goals and Plan of Care (Insert Text) Description Goals: 
(1.)Ms. Cameron Ayala will move from supine to sit and sit to supine , scoot up and down and roll side to side with INDEPENDENT within 6 treatment day(s). (2.)Ms. Cameron Ayala will transfer from bed to chair and chair to bed with MODIFIED INDEPENDENCE using the least restrictive device within 6 treatment day(s). (3.)Ms. Cameron Ayala will ambulate with SUPERVISION for 150 feet with the least restrictive device within 6 treatment day(s). (4.)Ms. Cameron Ayala will participate in BLE AROM strength exercises in supine and sitting to increase functional strength for PHYSICAL THERAPY: Initial Assessment and AM 5/15/2019 INPATIENT:   
Payor: Meliton Hobbs / Plan: [facility] % / Product Type: Meri Price /   
  
NAME/AGE/GENDER: Ngoc Rome is a 26 y.o. female PRIMARY DIAGNOSIS: Severe sepsis ([facility] [A41.9, R65.20] Severe sepsis ([address] Severe sepsis ([address] Procedure(s) (LRB): MRI ANESTHESIA/ THORACIC SPINE WITH AND WITHOUT CONTRAST/ CERVICAL SPINE WITH AND WITHOUT CONTRAST/ LUMBAR WITH AND WITHOUT CONTRAST/ 816 (N/A) 1 Day Post-Op ICD-10: Treatment Diagnosis:  
 Generalized Muscle Weakness (M62.81) Difficulty in walking, Not elsewhere classified (R26.2) Other abnormalities of gait and mobility (R26.89) Precaution/Allergies: 
Patient has no known allergies. ASSESSMENT:  
 
Ms. Cameron Ayala is a 26year old  female with an admitting diagnosis of Severe sepsis secondary to vertebral osteomyelitis. Her PMH includes IV drug use, UTI, Hx of PE and hypokalemia. She presents today supine in the bed asking for assistance to the toilet. She states her back pain is 10/10 and she has already been given pain medication. She reports she lives with her friend in a 2 story residence but she states she can stay on the lower level if needed.   She reports her PLOF as independent without an assistive device. She has no DME at home. She has active isolated BLE movements across all joints but she appears weak in strength 3/5 secondary to pain. She required minimal assist with supine to sit with steady sitting balance. Sit to stand was minimal assist and she ambulated with HHA to the toilet ~ 8-10 steps with poor gait quality secondary to pain. She voided independently and sit to stand from the toile was CGA. She returned to her bed to rest then she stood a 2nd time with CGA/minimal assist and use the r/walker to ambulate 40'. She reported that it felt good when first ambulating but she fatigues easily and her pain increased. She requested to return to her bed and she needed CGA to get her legs up onto the bed. She was positioned for comfort with the call light within reach. Ms. Arlin Ivan would benefit from continued skilled PT to maximize her functional abilities while in the hospital.  She may need some encouragement to participate more each therapy. This section established at most recent assessment PROBLEM LIST (Impairments causing functional limitations): 
Decreased Strength Decreased ADL/Functional Activities Decreased Transfer Abilities Decreased Ambulation Ability/Technique Increased Pain Decreased Activity Tolerance INTERVENTIONS PLANNED: (Benefits and precautions of physical therapy have been discussed with the patient.) Bed Mobility Gait Training Therapeutic Activites Therapeutic Exercise/Strengthening Transfer Training TREATMENT PLAN: Frequency/Duration: 3 times a week for duration of hospital stay Rehabilitation Potential For Stated Goals: Good REHAB RECOMMENDATIONS (at time of discharge pending progress):   
Placement: It is my opinion, based on this patient's performance to date, that Ms. Arlin Ivan may benefit from participating in 1-2 additional therapy sessions in order to continue to assess for rehab potential and then make recommendation for disposition at discharge. Equipment:  
Walkers, Type: Rolling Walker depending on progress made while in the hospital  
    
 
 
 
HISTORY:  
History of Present Injury/Illness (Reason for Referral): 
Odilia Torres is a 26 y.o. female with a past medical history of IV drug use who presents to the ER with report of 3 days of feeling chills and body aches all over. She denies any shana fevers, nausea, vomiting, dysuria, cough, congestion. Severe sepsis ([address] (2019) Vertebral Osteomyelitis. Active Problems: 
  IVDU (intravenous drug user) (2019) UTI (urinary tract infection) (2019) History of pulmonary embolism (2019) Hypokalemia (2019) Past Medical History/Comorbidities: Ms. Jani Roblero  has no past medical history on file. Ms. Jani Roblero  has a past surgical history that includes hx gyn; hx tubal ligation (Bilateral); hx  section; and hx orthopaedic (2018). Social History/Living Environment:  
Home Environment: Private residence # Steps to Enter: 5 One/Two Story Residence: Two story, live on 1st floor Living Alone: No 
Support Systems: Friends \ neighbors Patient Expects to be Discharged to[de-identified] Private residence Current DME Used/Available at Home: None Prior Level of Function/Work/Activity: 
Patient reports she lives in a 2 story residence with her friend and her PLOF was independent without an assistive device. Number of Personal Factors/Comorbidities that affect the Plan of Care: 1-2: MODERATE COMPLEXITY EXAMINATION:  
Most Recent Physical Functioning:  
Gross Assessment: 
AROM: Generally decreased, functional 
PROM: Generally decreased, functional 
Strength: Generally decreased, functional 
Tone: Normal 
Sensation: Intact Posture: 
Posture (WDL): Exceptions to [facility] Posture Assessment: Trunk flexion, Rounded shoulders Balance: 
Sitting: Intact Standing: Impaired Standing - Static: Fair;Constant support Standing - Dynamic : Fair Bed Mobility: 
Rolling: Independent Supine to Sit: Minimum assistance Sit to Supine: Contact guard assistance Scooting: Contact guard assistance Wheelchair Mobility: 
  
Transfers: 
Sit to Stand: Contact guard assistance;Minimum assistance Stand to Sit: Contact guard assistance Gait: 
  
Base of Support: Narrowed Speed/Ada: Slow Step Length: Left shortened;Right shortened Gait Abnormalities: Path deviations;Decreased step clearance Distance (ft): 40 Feet (ft) Assistive Device: Walker, rolling Ambulation - Level of Assistance: Contact guard assistance Interventions: Safety awareness training;Manual cues; Verbal cues Body Structures Involved: Metabolic Endocrine Body Functions Affected: 
Neuromusculoskeletal 
Movement Related Activities and Participation Affected: 
General Tasks and Demands Mobility Self Care Number of elements that affect the Plan of Care: 3: MODERATE COMPLEXITY CLINICAL PRESENTATION:  
Presentation: Evolving clinical presentation with changing clinical characteristics: MODERATE COMPLEXITY CLINICAL DECISION MAKING:  
[employer] AM-PAC? ?6 Clicks? Basic Mobility Inpatient Short Form How much difficulty does the patient currently have. .. Unable A Lot A Little None 1. Turning over in bed (including adjusting bedclothes, sheets and blankets)? ? 1   ? 2   ? 3   ? 4  
2. Sitting down on and standing up from a chair with arms ( e.g., wheelchair, bedside commode, etc.)   ? 1   ? 2   ? 3   ? 4  
3. Moving from lying on back to sitting on the side of the bed?   ? 1   ? 2   ? 3   ? 4 How much help from another person does the patient currently need. .. Total A Lot A Little None 4. Moving to and from a bed to a chair (including a wheelchair)? ? 1   ? 2   ? 3   ? 4  
5. Need to walk in hospital room? ? 1   ? 2   ? 3   ? 4  
6. Climbing 3-5 steps with a railing?    ? 1   ? 2   ? 3   ? 4  
 © 2007, Trustees of [address] [place] [address] [address] [address], under license to [employer]. All rights reserved Score:  Initial: 18 Most Recent: X (Date: -- ) Interpretation of Tool:  Represents activities that are increasingly more difficult (i.e. Bed mobility, Transfers, Gait). Medical Necessity:    
Patient is expected to demonstrate progress in strength and functional technique 
 to increase independence with bed mobility, SPT and gait with LRAD and improve safety during functional activities Peggygeorge Clarkeg Reason for Services/Other Comments: 
Patient continues to require skilled intervention due to medical complications Peggyann Gang Use of outcome tool(s) and clinical judgement create a POC that gives a: Questionable prediction of patient's progress: MODERATE COMPLEXITY  
  
 
 
 
TREATMENT:  
(In addition to Assessment/Re-Assessment sessions the following treatments were rendered) Pre-treatment Symptoms/Complaints:  Patient reports pain at 10/10 with all mobility Pain: Initial:  
Pain Intensity 1: 10 
Pain Location 1: Back  Post Session:  10/10 Assessment/Reassessment only, no treatment provided today Braces/Orthotics/Lines/Etc:  
IV 
O2 Device: Room air Treatment/Session Assessment:   
Response to Treatment:  Patient participated but limits her therapy secondary to pain. She needs a little encouragement to get up and move more. She needed to use the toilet so that was her motivation today. Interdisciplinary Collaboration:  
Physical Therapist 
Registered Nurse After treatment position/precautions:  
Supine in bed Bed in low position Call light within reach RN notified Compliance with Program/Exercises: Will assess as treatment progresses Recommendations/Intent for next treatment session: \"Next visit will focus on advancements to more challenging activities and reduction in assistance provided\". Total Treatment Duration: PT Patient Time In/Time Out Time In: 1792 Time Out: 1138 Micki Eisenmenger, [place]

## 2019-05-15 NOTE — PROGRESS NOTES
BSR received. Pt resting in bed. Respirations are even and unlabored on room air, pt refuses to wear oxygen, O2 stat of oxygen is 97. Nafcillin infusing. Pt denies any pain at this time. Call light within reach, bed low and locked.

## 2019-05-15 NOTE — PROGRESS NOTES
Pt resting in bed. Respirations are even and unlabored on room air. Nafcillin infusing. Pt denies pain at this time. Call light within reach, bed low and locked. Will give report to oncoming RN.

## 2019-05-16 ENCOUNTER — APPOINTMENT (OUTPATIENT)
Dept: CT IMAGING | Age: 34
DRG: 871 | End: 2019-05-16
Attending: INTERNAL MEDICINE
Payer: SUBSIDIZED

## 2019-05-16 PROCEDURE — 74011250637 HC RX REV CODE- 250/637: Performed by: FAMILY MEDICINE

## 2019-05-16 PROCEDURE — 97530 THERAPEUTIC ACTIVITIES: CPT

## 2019-05-16 PROCEDURE — 74011250636 HC RX REV CODE- 250/636: Performed by: FAMILY MEDICINE

## 2019-05-16 PROCEDURE — 65270000029 HC RM PRIVATE

## 2019-05-16 PROCEDURE — 74011250637 HC RX REV CODE- 250/637: Performed by: HOSPITALIST

## 2019-05-16 PROCEDURE — 71250 CT THORAX DX C-: CPT

## 2019-05-16 RX ADMIN — Medication 10 ML: at 21:35

## 2019-05-16 RX ADMIN — RIVAROXABAN 15 MG: 15 TABLET, FILM COATED ORAL at 08:15

## 2019-05-16 RX ADMIN — TRAMADOL HYDROCHLORIDE 50 MG: 50 TABLET, FILM COATED ORAL at 17:58

## 2019-05-16 RX ADMIN — Medication 1 AMPULE: at 21:35

## 2019-05-16 RX ADMIN — RIVAROXABAN 15 MG: 15 TABLET, FILM COATED ORAL at 16:56

## 2019-05-16 RX ADMIN — Medication 1 AMPULE: at 08:15

## 2019-05-16 RX ADMIN — Medication 10 ML: at 06:35

## 2019-05-16 RX ADMIN — Medication 10 ML: at 16:56

## 2019-05-16 RX ADMIN — ONDANSETRON 4 MG: 2 INJECTION INTRAMUSCULAR; INTRAVENOUS at 12:19

## 2019-05-16 RX ADMIN — TRAMADOL HYDROCHLORIDE 50 MG: 50 TABLET, FILM COATED ORAL at 12:15

## 2019-05-16 NOTE — PROGRESS NOTES
Infectious Disease Progress Note Today's Date: 2019 Admit Date: 2019 Impression: · MSSA bacteremia, R-clindamycin, (, ), repeat culture  pending; TTE, TC negative · Back pain: MRI with concern for septic facet arthritis T12-L5 · Pulmonary cavitary lesions · Hep C ab positive (2019) Plan:  
· Continue nafcillin 12 gm/day via continuous infusion. She will need six weeks of treatment from negative cultures. Wait to place PICC. · If stools meet criteria, would check C diff. She reports 3-4 watery stools past 24 hours. Anti-infectives: · Nafcillin ( - 
· Vancomycin (, ) · Ceftriaxone ( - 5/10, -) · Zosyn () Subjective: Interval history: complains of worsening back pain, diarrhea. Denies nausea, vomiting, still complains of chills. No Known Allergies Review of Systems:  A comprehensive review of systems was negative except for that written in the History of Present Illness. Objective:  
 
Visit Vitals /67 Pulse (!) 104 Temp 98.7 °F (37.1 °C) Resp 17 Ht 5' 3\" (1.6 m) Wt 78.5 kg (173 lb 1.6 oz) SpO2 96% Breastfeeding? Yes  
BMI 30.66 kg/m² Temp (24hrs), Av.1 °F (37.3 °C), Min:98.5 °F (36.9 °C), Max:100 °F (37.8 °C) Lines:  Peripheral IV:    
 
Physical Exam:   
General:  Cooperative, appears ill and uncomfortable, appears stated age Eyes:  Sclera anicteric, not injected and no drainage Mouth/Throat: Mucosa moist, op clear, multiple broken and missing teeth Neck: Supple, midline trachea Lungs:   Posterior lungs without audible wheezes, room air and no increased work of breathing CV:  Regular rate and rhythm, no audible murmur Abdomen:   Soft, non tender, non distended, active bowel sounds Extremities: No cyanosis or edema Skin: Multiple tattoos and piercings, no rash Musculoskeletal: No joint effusions Lines/Devices:  Intact, no erythema, drainage or tenderness Neuro:  Intact motor function LEs Psych: Alert and oriented, appropriate Data Review: CBC: 
No results for input(s): WBC, GRANS, MONOS, EOS, ANEU, ABL, HGB, HCT, PLT, HGBEXT, HCTEXT, PLTEXT, HGBEXT, HCTEXT, PLTEXT in the last 72 hours. No lab exists for component: LYMPHS,  MAILE BMP: 
No results for input(s): CREA, BUN, NA, K, CL, CO2, AGAP, GLU in the last 72 hours. LFTS: 
No results for input(s): TBILI, ALT, SGOT, AP, TP, ALB in the last 72 hours. Microbiology:  
 
All Micro Results Procedure Component Value Units Date/Time CULTURE, BLOOD [[id number]] Collected:  05/14/19 0961 Order Status:  Completed Specimen:  Blood Updated:  05/15/19 1046 Special Requests: LEFT HAND Culture result: NO GROWTH 1 DAY     
 CULTURE, BLOOD [[id number]]  (Abnormal) Collected:  05/11/19 0854 Order Status:  Completed Specimen:  Blood Updated:  05/14/19 0719 Special Requests: --     
  RIGHT 
ARM 
  
  GRAM STAIN    
  GRAM POS COCCI IN CLUSTERS  
     
   PEDIATRIC BOTTLE     
      
  CRITICAL RESULT NOT CALLED DUE TO PREVIOUS NOTIFICATION OF CRITICAL RESULT WITHIN THE LAST 24 HOURS. Culture result: STAPHYLOCOCCUS AUREUS     
   FOR SUSCEPTIBILITY REFER TO [id number] CULTURE, BLOOD [[id number]]  (Abnormal) Collected:  05/11/19 0854 Order Status:  Completed Specimen:  Blood Updated:  05/14/19 8951 Special Requests: --     
  LEFT 
HAND 
  
  GRAM STAIN GRAM POSITIVE COCCI     
   PEDIATRIC BOTTLE RESULTS VERIFIED, PHONED TO AND READ BACK BY Paulette Mora @[facility] 05/12/2019 [facility] Culture result: STAPHYLOCOCCUS AUREUS     
   FOR SUSCEPTIBILTIY REFER TO [address] NO [id number] CULTURE, BLOOD [[id number]]  (Abnormal) Collected:  05/09/19 1822 Order Status:  Completed Specimen:  Whole Blood Updated:  05/14/19 2654   Special Requests: --     
  RIGHT 
FOREARM 
  
  GRAM STAIN    
  GRAM POS COCCI IN CLUSTERS  
     
      
  AEROBIC AND ANAEROBIC BOTTLES  
 CRITICAL RESULT NOT CALLED DUE TO PREVIOUS NOTIFICATION OF CRITICAL RESULT WITHIN THE LAST 24 HOURS. Culture result: STAPHYLOCOCCUS AUREUS     
   FOR SUSCEPTIBILITY REFER TO [address] [place] [address] Drive NO [id number] CULTURE, BLOOD [[id number]]  (Abnormal)  (Susceptibility) Collected:  05/09/19 1903 Order Status:  Completed Specimen:  Whole Blood Updated:  05/14/19 9636 Special Requests: --     
  RIGHT Antecubital 
  
  GRAM STAIN    
  GRAM POS COCCI IN CLUSTERS  
     
      
  AEROBIC AND ANAEROBIC BOTTLES RESULTS VERIFIED, PHONED TO AND READ BACK BY Cabrera Baker RN @ 2905 ON 5/10/19 BY ADS Culture result: STAPHYLOCOCCUS AUREUS     
      
  MRSA target DNA sequences not detected: SA target DNA sequence detected within the sample. Test performed by PCR  Culture/Sensitivity to follow CULTURE, URINE [[id number]]  (Abnormal)  (Susceptibility) Collected:  05/09/19 2027 Order Status:  Completed Specimen:  Urine from Clean catch Updated:  05/13/19 5146 Special Requests: NO SPECIAL REQUESTS Culture result:    
  >100,000 COLONIES/mL ESCHERICHIA COLI  
     
      
  10,000 to 50,000 COLONIES/mL STAPHYLOCOCCUS AUREUS Imaging:  
Reviewed: MRI 5/14/19 C-spine IMPRESSION: 
1. Mild spinal stenosis at C2-C3 and C3-C4. 2. Mild to moderate spinal stenosis at C4-C5. T-spine IMPRESSION: 
  
1. Bilateral pleural effusions with cavitary lung lesions. Correlate with 
pulmonary imaging. 
  
2. No significant thoracic spinal stenosis or findings of discitis. 
  
2. Edema-like signal around the right-sided articular facet at T12-L1. Facet arthritis including septic arthritis could account for this finding. See MRI lumbar spine for further description. L-spine MPRESSION: 
1. Abnormal enhancement posterior to the articular facet joints at the T12-L1, L3-L4 and L4-L5 levels. Findings are concerning for septic facet arthritis.  See 
 above description. Similar findings are present around the right SI joint which 
is likely infected as well. 
  
2. Minimal enhancement along the inferior endplate of L1. This may be 
degenerative in etiology. Consider short follow-up MR imaging of the lumbar 
spine in several weeks for further assessment. CXR 5/13/19 IMPRESSION: New faint infiltrate right base, possibly lobar pneumonia. Left lung 
infiltrate is cleared. Signed By: Meet Canchola NP May 16, 2019

## 2019-05-16 NOTE — PROGRESS NOTES
Pt is stable. Alert and oriented x4. Respirations even and unlabored. Heart sounds regular. Called multiple times during the night for bathroom assistance and to readjust her sheets. Is currently resting in bed. Does not appear to be in any distress at this time. Bed is in low position, wheels locked and call light in reach. Safety measure in place. Pt is stable. SBAR report given to oncoming nurse.

## 2019-05-16 NOTE — PROGRESS NOTES
Infectious Disease Progress Note Today's Date: 2019 Admit Date: 2019 Impression: · MSSA bacteremia, R-clindamycin, (, ), repeat culture  pending; TTE, TC negative · Back pain: MRI with concern for septic facet arthritis T12-L5 · Pulmonary cavitary lesions · Hep C ab positive (2019) Plan:  
· Continue nafcillin 12 gm/day via continuous infusion. She will need six weeks of treatment from negative cultures. Wait to place PICC. · Will check CT to further assess cavitary lung lesions. · If stools meet criteria, would check C diff. She reports 3-4 watery stools past 24 hours. Anti-infectives: · Nafcillin ( - 
· Vancomycin (, ) · Ceftriaxone ( - 5/10, -) · Zosyn () Subjective: Interval history: complains of worsening back pain, diarrhea. Denies nausea, vomiting, still complains of chills. No Known Allergies Review of Systems:  A comprehensive review of systems was negative except for that written in the History of Present Illness. Objective:  
 
Visit Vitals /67 Pulse (!) 104 Temp 98.7 °F (37.1 °C) Resp 17 Ht 5' 3\" (1.6 m) Wt 78.5 kg (173 lb 1.6 oz) SpO2 96% Breastfeeding? Yes  
BMI 30.66 kg/m² Temp (24hrs), Av.3 °F (37.4 °C), Min:98.7 °F (37.1 °C), Max:100 °F (37.8 °C) Lines:  Peripheral IV:    
 
Physical Exam:   
General:  Cooperative, appears ill and uncomfortable, appears stated age Eyes:  Sclera anicteric, not injected and no drainage Mouth/Throat: Mucosa moist, op clear, multiple broken and missing teeth Neck: Supple, midline trachea Lungs:   Posterior lungs without audible wheezes, room air and no increased work of breathing CV:  Regular rate and rhythm, no audible murmur Abdomen:   Soft, non tender, non distended, active bowel sounds Extremities: No cyanosis or edema Skin: Multiple tattoos and piercings, no rash Musculoskeletal: No joint effusions Lines/Devices:  Intact, no erythema, drainage or tenderness Neuro:  Intact motor function LEs Psych: Alert and oriented, appropriate Data Review: CBC: 
No results for input(s): WBC, GRANS, MONOS, EOS, ANEU, ABL, HGB, HCT, PLT, HGBEXT, HCTEXT, PLTEXT, HGBEXT, HCTEXT, PLTEXT in the last 72 hours. No lab exists for component: LYMPHS,  MAILE BMP: 
No results for input(s): CREA, BUN, NA, K, CL, CO2, AGAP, GLU in the last 72 hours. LFTS: 
No results for input(s): TBILI, ALT, SGOT, AP, TP, ALB in the last 72 hours. Microbiology:  
 
All Micro Results Procedure Component Value Units Date/Time CULTURE, BLOOD [[id number]] Collected:  05/14/19 0051 Order Status:  Completed Specimen:  Blood Updated:  05/16/19 1308 Special Requests: LEFT HAND Culture result: NO GROWTH 2 DAYS     
 CULTURE, BLOOD [[id number]]  (Abnormal) Collected:  05/11/19 0854 Order Status:  Completed Specimen:  Blood Updated:  05/14/19 0719 Special Requests: --     
  RIGHT 
ARM 
  
  GRAM STAIN    
  GRAM POS COCCI IN CLUSTERS  
     
   PEDIATRIC BOTTLE     
      
  CRITICAL RESULT NOT CALLED DUE TO PREVIOUS NOTIFICATION OF CRITICAL RESULT WITHIN THE LAST 24 HOURS. Culture result: STAPHYLOCOCCUS AUREUS     
   FOR SUSCEPTIBILITY REFER TO [id number] CULTURE, BLOOD [[id number]]  (Abnormal) Collected:  05/11/19 0854 Order Status:  Completed Specimen:  Blood Updated:  05/14/19 4517 Special Requests: --     
  LEFT 
HAND 
  
  GRAM STAIN GRAM POSITIVE COCCI     
   PEDIATRIC BOTTLE RESULTS VERIFIED, PHONED TO AND READ BACK BY Rogena Galeazzi @6906 05/12/2019 [facility] Culture result: STAPHYLOCOCCUS AUREUS     
   FOR SUSCEPTIBILTIY REFER TO [address] NO [id number] CULTURE, BLOOD [[id number]]  (Abnormal) Collected:  05/09/19 1822 Order Status:  Completed Specimen:  Whole Blood Updated:  05/14/19 0082   Special Requests: --     
  RIGHT 
FOREARM 
  
  GRAM STAIN    
 GRAM POS COCCI IN CLUSTERS  
     
      
  AEROBIC AND ANAEROBIC BOTTLES  
     
      
  CRITICAL RESULT NOT CALLED DUE TO PREVIOUS NOTIFICATION OF CRITICAL RESULT WITHIN THE LAST 24 HOURS. Culture result: STAPHYLOCOCCUS AUREUS     
   FOR SUSCEPTIBILITY REFER TO [facility] NO [id number] CULTURE, BLOOD [[id number]]  (Abnormal)  (Susceptibility) Collected:  05/09/19 1903 Order Status:  Completed Specimen:  Whole Blood Updated:  05/14/19 4552 Special Requests: --     
  RIGHT Antecubital 
  
  GRAM STAIN    
  GRAM POS COCCI IN CLUSTERS  
     
      
  AEROBIC AND ANAEROBIC BOTTLES RESULTS VERIFIED, PHONED TO AND READ BACK BY Naomi Barber RN @ 8942 ON 5/10/19 BY ADS Culture result: STAPHYLOCOCCUS AUREUS     
      
  MRSA target DNA sequences not detected: SA target DNA sequence detected within the sample. Test performed by PCR  Culture/Sensitivity to follow CULTURE, URINE [[id number]]  (Abnormal)  (Susceptibility) Collected:  05/09/19 2027 Order Status:  Completed Specimen:  Urine from Clean catch Updated:  05/13/19 8838 Special Requests: NO SPECIAL REQUESTS Culture result:    
  >100,000 COLONIES/mL ESCHERICHIA COLI  
     
      
  10,000 to 50,000 COLONIES/mL STAPHYLOCOCCUS AUREUS Imaging:  
Reviewed: MRI 5/14/19 C-spine IMPRESSION: 
1. Mild spinal stenosis at C2-C3 and C3-C4. 2. Mild to moderate spinal stenosis at C4-C5. T-spine IMPRESSION: 
  
1. Bilateral pleural effusions with cavitary lung lesions. Correlate with 
pulmonary imaging. 
  
2. No significant thoracic spinal stenosis or findings of discitis. 
  
2. Edema-like signal around the right-sided articular facet at T12-L1. Facet arthritis including septic arthritis could account for this finding. See MRI lumbar spine for further description. L-spine MPRESSION: 
1.  Abnormal enhancement posterior to the articular facet joints at the T12-L1, 
 L3-L4 and L4-L5 levels. Findings are concerning for septic facet arthritis. See 
above description. Similar findings are present around the right SI joint which 
is likely infected as well. 
  
2. Minimal enhancement along the inferior endplate of L1. This may be 
degenerative in etiology. Consider short follow-up MR imaging of the lumbar 
spine in several weeks for further assessment. CXR 5/13/19 IMPRESSION: New faint infiltrate right base, possibly lobar pneumonia. Left lung 
infiltrate is cleared. Signed By: Nell Rubio NP May 16, 2019

## 2019-05-16 NOTE — PROGRESS NOTES
Nutrition Reason for assessment: LOS Day 6 Assessment:  
Diet: DIET REGULAR   
PMH: IV drug abuse Food/Nutrition Patient History:  The patient is currently admitted for sepsis. She states that her appetite is not doing well. She reports ongoing nausea and diarrhea. She denies any issues with vomiting. She is minimally verbal and only answers with short 1-2 word sentences. She denies any other po difficulties and has no questions or concerns regarding food or nutrition at this time. Anthropometrics:Height: 5' 3\" (160 cm),  Weight: 78.5 kg (173 lb 1.6 oz), Weight Source: Bed, Body mass index is 30.66 kg/m². BMI class of obesity class I. Macronutrient needs: EER:  9800-2096 kcal /day (20-25 kcal/kg listed BW of 78.5 kg) EPR:  63-79 grams protein/day (0.8-1 grams/kg listed BW) Intake/Comparative Standards: Average intake for past 14 recorded meal(s): 67%. This potentially meets ~98% of kcal and ~100% of protein needs. Nutrition Diagnosis: Predicted inadequate oral intake related to nausea and diarrhea as evidenced by patient reports decrease in appetite at this time. Intervention: 
Meals and snacks: Continue current diet. Nutrition Supplement Therapy: Add ensure enlive once daily. Nutrition Discharge Plan: too soon to determine [address], [address] [place] [address], [phone]

## 2019-05-16 NOTE — PROGRESS NOTES
SBAR report received from [facility]. Pt is stable, irritable. Has some scarring on face, missing and cracked teeth. Up ad selena. Respirations even and unlabored, heart sounds regular. Denies pain. Safety measures in place, bed low and locked, call light in reach.

## 2019-05-16 NOTE — PROGRESS NOTES
HOSPITALIST 
NAME:  Vikash Becerra Age:  26 y.o. 
:   1985 MRN:   [id number] PCP: None Consulting MD: Treatment Team: Attending Provider: Jessica Shahid MD; Care Manager: Amelie Jiang RN; Utilization Review: Yoanna Haro RN; Consulting Provider: Deyanira Rodriges MD; Consulting Provider: John Carter MD 
 
CHIEF COMPLAINT: chills, body aches 
 
subj  
 
a 26 y.o. female with a past medical history of IV drug use who presents to the ER with report of 3 days of feeling chills and body aches all over. She denies any shana fevers, nausea, vomiting, dysuria, cough, congestion. Today, no fever, no ac events, localizing some lower back pain- no change Objective:  
 
Visit Vitals /76 Pulse (!) 105 Temp 99.7 °F (37.6 °C) Resp 18 Ht 5' 3\" (1.6 m) Wt 78.5 kg (173 lb 1.6 oz) SpO2 94% Breastfeeding? Yes  
BMI 30.66 kg/m² Temp (24hrs), Av.4 °F (37.4 °C), Min:98.7 °F (37.1 °C), Max:100 °F (37.8 °C) Oxygen Therapy O2 Sat (%): 94 % (19 1527) Pulse via Oximetry: 93 beats per minute (19 1639) O2 Device: Room air (19 0806) O2 Flow Rate (L/min): 2 l/min (05/15/19 1207) Physical Exam: 
General:    AAO3 Lungs:   Clear to auscultation bilaterally Heart:   Regular rate and rhythm, without murmurs, rubs, or gallops. Abdomen:   Soft, non-tender, non-distended with normoactive bowel sounds. Genitourinary: No tenderness over the bladder or bilateral CVAs. Extremities: Without clubbing, cyanosis, or edema. Skin:     Normal color, texture, and turgor. No rashes, lesions, or jaundice. Neurologic: grossly intact Data Review: No results found for this or any previous visit (from the past 24 hour(s)). Imaging Reeda Mendez /Studies: No results found. Assessment and Plan: 1- Bacteremia, MSSA w/ L-T septic arthritis/ OM dt IVD use 2- Ac UTI, urine E coli Rx: On [address] [facility] per ID Follow blood CS final results, repeat Cs positive Ultram 
 PT consulted Generalized pain per pt is tolerable w/ out strong narcotics K replacement as needed Dispo: need long term hospitalization for IV abx Signed By: Enoch Edmond MD   
 May 16, 2019

## 2019-05-16 NOTE — PROGRESS NOTES
Problem: Mobility Impaired (Adult and Pediatric) Goal: *Acute Goals and Plan of Care (Insert Text) Description Goals: 
(1.)Ms. Astrid Bumpers will move from supine to sit and sit to supine , scoot up and down and roll side to side with INDEPENDENT within 6 treatment day(s). (2.)Ms. Astrid Bumpers will transfer from bed to chair and chair to bed with MODIFIED INDEPENDENCE using the least restrictive device within 6 treatment day(s). (3.)Ms. Astrid Bumpers will ambulate with SUPERVISION for 150 feet with the least restrictive device within 6 treatment day(s). (4.)Ms. Astrid Bumpers will participate in BLE AROM strength exercises in supine and sitting to increase functional strength for PHYSICAL THERAPY: Daily Note and PM 5/16/2019 INPATIENT: PT Visit Days : 1 Payor: Eloise Andrews / Plan: [facility] % / Product Type: Christopher Andes /   
  
NAME/AGE/GENDER: Tonya Fall is a [address] y.o. female PRIMARY DIAGNOSIS: Severe sepsis ([address] [A41.9, R65.20] Severe sepsis ([address] Severe sepsis ([address] Procedure(s) (LRB): MRI ANESTHESIA/ THORACIC SPINE WITH AND WITHOUT CONTRAST/ CERVICAL SPINE WITH AND WITHOUT CONTRAST/ LUMBAR WITH AND WITHOUT CONTRAST/ 816 (N/A) 2 Days Post-Op ICD-10: Treatment Diagnosis:  
 · Generalized Muscle Weakness (M62.81) · Difficulty in walking, Not elsewhere classified (R26.2) · Other abnormalities of gait and mobility (R26.89) Precaution/Allergies: 
Patient has no known allergies. ASSESSMENT:  
Ms. Astrid Bumpers is a [address] old  female with an admitting diagnosis of Severe sepsis secondary to vertebral osteomyelitis. Her PMH includes IV drug use, UTI, Hx of PE and hypokalemia. She presents today supine, willing to walk after going to the bathroom. She was independent with bed mobility with a log roll out of bed. She stood and got herself to the bathroom. She walked in hallway about 60 feet with the walker without issues. Limited mobility. Will continue to assist to improve. This section established at most recent assessment PROBLEM LIST (Impairments causing functional limitations): 1. Decreased Strength 2. Decreased ADL/Functional Activities 3. Decreased Transfer Abilities 4. Decreased Ambulation Ability/Technique 5. Increased Pain 6. Decreased Activity Tolerance INTERVENTIONS PLANNED: (Benefits and precautions of physical therapy have been discussed with the patient.) 1. Bed Mobility 2. Gait Training 3. Therapeutic Activites 4. Therapeutic Exercise/Strengthening 5. Transfer Training TREATMENT PLAN: Frequency/Duration: 3 times a week for duration of hospital stay Rehabilitation Potential For Stated Goals: Good REHAB RECOMMENDATIONS (at time of discharge pending progress):   
Placement: It is my opinion, based on this patient's performance to date, that Ms. Melody Molina may benefit from participating in 1-2 additional therapy sessions in order to continue to assess for rehab potential and then make recommendation for disposition at discharge. Equipment:  
? Walkers, Type: Rolling Walker depending on progress made while in the hospital  
    
 
 
 
HISTORY:  
History of Present Injury/Illness (Reason for Referral): 
Adrian Gaffney is a 26 y.o. female with a past medical history of IV drug use who presents to the ER with report of 3 days of feeling chills and body aches all over. She denies any shana fevers, nausea, vomiting, dysuria, cough, congestion. Severe sepsis ([address] (2019) Vertebral Osteomyelitis. Active Problems: 
  IVDU (intravenous drug user) (2019) UTI (urinary tract infection) (2019) History of pulmonary embolism (2019) Hypokalemia (2019) Past Medical History/Comorbidities: Ms. Melody Molina  has no past medical history on file. Ms. Melody Molina  has a past surgical history that includes hx gyn; hx tubal ligation (Bilateral); hx  section; and hx orthopaedic (2018). Social History/Living Environment: Home Environment: Private residence # Steps to Enter: 5 One/Two Story Residence: Two story, live on 1st floor Living Alone: No 
Support Systems: Friends \ neighbors Patient Expects to be Discharged to[de-identified] Private residence Current DME Used/Available at Home: None Prior Level of Function/Work/Activity: 
Patient reports she lives in a 2 story residence with her friend and her PLOF was independent without an assistive device. Number of Personal Factors/Comorbidities that affect the Plan of Care: 1-2: MODERATE COMPLEXITY EXAMINATION:  
Most Recent Physical Functioning:  
Gross Assessment: 
  
         
  
Posture: 
  
Balance: 
  Bed Mobility: 
Rolling: Modified independent Supine to Sit: Modified independent Sit to Supine: Independent Wheelchair Mobility: 
  
Transfers: 
Sit to Stand: Stand-by assistance Stand to Sit: Stand-by assistance Gait: 
  
Speed/Ada: Pace decreased (<100 feet/min) Gait Abnormalities: Decreased step clearance;Shuffling gait Distance (ft): 60 Feet (ft) Assistive Device: Walker, rolling Ambulation - Level of Assistance: Stand-by assistance Body Structures Involved: 1. Metabolic 2. Endocrine Body Functions Affected: 1. Neuromusculoskeletal 
2. Movement Related Activities and Participation Affected: 1. General Tasks and Demands 2. Mobility 3. Self Care Number of elements that affect the Plan of Care: 3: MODERATE COMPLEXITY CLINICAL PRESENTATION:  
Presentation: Evolving clinical presentation with changing clinical characteristics: MODERATE COMPLEXITY CLINICAL DECISION MAKIN [facility] [address] [address] AM-PAC 6 Clicks Basic Mobility Inpatient Short Form How much difficulty does the patient currently have. .. Unable A Lot A Little None 1. Turning over in bed (including adjusting bedclothes, sheets and blankets)? ? 1   ? 2   ? 3   ? 4  
2.   Sitting down on and standing up from a chair with arms ( e.g., wheelchair, bedside commode, etc.)   ? 1   ? 2   ? 3   ? 4  
3. Moving from lying on back to sitting on the side of the bed?   ? 1   ? 2   ? 3   ? 4 How much help from another person does the patient currently need. .. Total A Lot A Little None 4. Moving to and from a bed to a chair (including a wheelchair)? ? 1   ? 2   ? 3   ? 4  
5. Need to walk in hospital room? ? 1   ? 2   ? 3   ? 4  
6. Climbing 3-5 steps with a railing? ? 1   ? 2   ? 3   ? 4  
© 2007, Trustees of [facility] [employer], under license to [employer]. All rights reserved Score:  Initial: 18 Most Recent: X (Date: -- ) Interpretation of Tool:  Represents activities that are increasingly more difficult (i.e. Bed mobility, Transfers, Gait). Medical Necessity:    
· Patient is expected to demonstrate progress in strength and functional technique ·  to increase independence with bed mobility, SPT and gait with LRAD and improve safety during functional activities · . Reason for Services/Other Comments: 
· Patient continues to require skilled intervention due to medical complications · . Use of outcome tool(s) and clinical judgement create a POC that gives a: Questionable prediction of patient's progress: MODERATE COMPLEXITY  
  
 
 
 
TREATMENT:  
(In addition to Assessment/Re-Assessment sessions the following treatments were rendered) Pre-treatment Symptoms/Complaints:  I guess Pain: Initial:  
Pain Intensity 1: 4 Pain Location 1: Back  Post Session:  10/10 Therapeutic Activity: (    10 minutes): Therapeutic activities including Bed transfers, Toilet transfers and Ambulation on level ground to improve mobility, strength, balance and endurance. Required minimal   to promote static and dynamic balance in standing with use of the walker. Braces/Orthotics/Lines/Etc:  
· IV 
· O2 Device: Room air Treatment/Session Assessment:   
· Response to Treatment:  Above -  Poor disposition · Interdisciplinary Collaboration:  
o Physical Therapy Assistant 
o Registered Nurse · After treatment position/precautions:  
o Supine in bed 
o Bed in low position 
o Call light within reach 
o RN notified · Compliance with Program/Exercises: Compliant all of the time · Recommendations/Intent for next treatment session: \"Next visit will focus on advancements to more challenging activities and reduction in assistance provided\". Total Treatment Duration: PT Patient Time In/Time Out Time In: 1410 Time Out: 0045 Sahil Cruz, PTA

## 2019-05-16 NOTE — PROGRESS NOTES
BSR received. Pt just arrived from [place]. Pt is alert and oriented x4. Respirations are even and unlabored on room air. Nafcillin infusing. Pt denies pain at this time. Call light within reach, bed low and locked.

## 2019-05-16 NOTE — PROGRESS NOTES
Pt resting in the bed. Pt is stable. Pt is axo. Respirations are even and unlabored on room air. No needs expressed. Safety measures are in place. Will continue to monitor.

## 2019-05-16 NOTE — PROGRESS NOTES
Pt given tramadol for pain. Pt is irritable, yelled \"I dont need help! \" when checked on due to call light going off. Pt threw empty ice cream container and fork across the room. Pt is stable.

## 2019-05-17 PROCEDURE — 74011250637 HC RX REV CODE- 250/637: Performed by: FAMILY MEDICINE

## 2019-05-17 PROCEDURE — 74011250636 HC RX REV CODE- 250/636: Performed by: INTERNAL MEDICINE

## 2019-05-17 PROCEDURE — 65270000029 HC RM PRIVATE

## 2019-05-17 PROCEDURE — 74011000302 HC RX REV CODE- 302: Performed by: HOSPITALIST

## 2019-05-17 PROCEDURE — 74011250637 HC RX REV CODE- 250/637: Performed by: HOSPITALIST

## 2019-05-17 PROCEDURE — 97530 THERAPEUTIC ACTIVITIES: CPT

## 2019-05-17 PROCEDURE — 86580 TB INTRADERMAL TEST: CPT | Performed by: HOSPITALIST

## 2019-05-17 PROCEDURE — 74011250637 HC RX REV CODE- 250/637: Performed by: INTERNAL MEDICINE

## 2019-05-17 RX ORDER — METHADONE HYDROCHLORIDE 10 MG/1
10 TABLET ORAL 2 TIMES DAILY
Status: DISCONTINUED | OUTPATIENT
Start: 2019-05-17 | End: 2019-06-02 | Stop reason: HOSPADM

## 2019-05-17 RX ORDER — CEFAZOLIN SODIUM/WATER 2 G/20 ML
2 SYRINGE (ML) INTRAVENOUS EVERY 8 HOURS
Status: DISCONTINUED | OUTPATIENT
Start: 2019-05-17 | End: 2019-06-01

## 2019-05-17 RX ORDER — METHADONE HYDROCHLORIDE 10 MG/1
20 TABLET ORAL DAILY
Status: DISCONTINUED | OUTPATIENT
Start: 2019-05-18 | End: 2019-05-17

## 2019-05-17 RX ADMIN — METHADONE HYDROCHLORIDE 10 MG: 10 TABLET ORAL at 17:07

## 2019-05-17 RX ADMIN — RIVAROXABAN 15 MG: 15 TABLET, FILM COATED ORAL at 17:06

## 2019-05-17 RX ADMIN — TRAMADOL HYDROCHLORIDE 50 MG: 50 TABLET, FILM COATED ORAL at 19:58

## 2019-05-17 RX ADMIN — Medication 1 AMPULE: at 08:09

## 2019-05-17 RX ADMIN — TRAMADOL HYDROCHLORIDE 50 MG: 50 TABLET, FILM COATED ORAL at 08:12

## 2019-05-17 RX ADMIN — Medication 10 ML: at 23:13

## 2019-05-17 RX ADMIN — Medication 10 ML: at 05:04

## 2019-05-17 RX ADMIN — Medication 2 G: at 17:06

## 2019-05-17 RX ADMIN — Medication 10 ML: at 17:07

## 2019-05-17 RX ADMIN — Medication 2 G: at 23:12

## 2019-05-17 RX ADMIN — Medication 1 AMPULE: at 23:12

## 2019-05-17 RX ADMIN — RIVAROXABAN 15 MG: 15 TABLET, FILM COATED ORAL at 08:09

## 2019-05-17 RX ADMIN — TUBERCULIN PURIFIED PROTEIN DERIVATIVE 5 UNITS: 5 INJECTION, SOLUTION INTRADERMAL at 11:10

## 2019-05-17 NOTE — PROGRESS NOTES
Problem: Mobility Impaired (Adult and Pediatric) Goal: *Acute Goals and Plan of Care (Insert Text) Description Goals: 
(1.)Ms. Ness Pena will move from supine to sit and sit to supine , scoot up and down and roll side to side with INDEPENDENT within 6 treatment day(s). (2.)Ms. Ness Pena will transfer from bed to chair and chair to bed with MODIFIED INDEPENDENCE using the least restrictive device within 6 treatment day(s). (3.)Ms. Ness Pena will ambulate with SUPERVISION for 150 feet with the least restrictive device within 6 treatment day(s). (4.)Ms. Ness Pena will participate in BLE AROM strength exercises in supine and sitting to increase functional strength for PHYSICAL THERAPY: Daily Note and PM 5/17/2019 INPATIENT: PT Visit Days : 2 Payor: Hosey Sacks / Plan: [facility] % / Product Type: Destini Zamudio /   
  
NAME/AGE/GENDER: Francesco  is a 26 y.o. female PRIMARY DIAGNOSIS: Severe sepsis ([address] [A41.9, R65.20] Severe sepsis ([address] Severe sepsis ([address] Procedure(s) (LRB): MRI ANESTHESIA/ THORACIC SPINE WITH AND WITHOUT CONTRAST/ CERVICAL SPINE WITH AND WITHOUT CONTRAST/ LUMBAR WITH AND WITHOUT CONTRAST/ 816 (N/A) 3 Days Post-Op ICD-10: Treatment Diagnosis:  
 · Generalized Muscle Weakness (M62.81) · Difficulty in walking, Not elsewhere classified (R26.2) · Other abnormalities of gait and mobility (R26.89) Precaution/Allergies: 
Patient has no known allergies. ASSESSMENT:  
Ms. Ness Pena is a 26year old  female with an admitting diagnosis of Severe sepsis secondary to vertebral osteomyelitis. Her PMH includes IV drug use, UTI, Hx of PE and hypokalemia. 5/17/19: pt supine in bed on arrival. She is agreeable to PT. Bed mobility with Mod I with log roll. Sit to stand with SBA. While therapist was trying to tie her gown while standing, she yelled \"I'm ready to go\". Pt ambulated in the hallway with slow unsteady gait.  She returned to room and sat EOB and requested to use the restroom. She refused using the walker to the restroom. Pt was able to perform all self care in restroom without assist. She returned to supine in bed with needs in reach. She is not motivated and did not want to stay up in the chair. This section established at most recent assessment PROBLEM LIST (Impairments causing functional limitations): 1. Decreased Strength 2. Decreased ADL/Functional Activities 3. Decreased Transfer Abilities 4. Decreased Ambulation Ability/Technique 5. Increased Pain 6. Decreased Activity Tolerance INTERVENTIONS PLANNED: (Benefits and precautions of physical therapy have been discussed with the patient.) 1. Bed Mobility 2. Gait Training 3. Therapeutic Activites 4. Therapeutic Exercise/Strengthening 5. Transfer Training TREATMENT PLAN: Frequency/Duration: 3 times a week for duration of hospital stay Rehabilitation Potential For Stated Goals: Good REHAB RECOMMENDATIONS (at time of discharge pending progress):   
Placement: It is my opinion, based on this patient's performance to date, that Ms. Chivo Casas may benefit from participating in 1-2 additional therapy sessions in order to continue to assess for rehab potential and then make recommendation for disposition at discharge. Equipment:  
? Walkers, Type: Rolling Walker depending on progress made while in the hospital  
    
 
 
 
HISTORY:  
History of Present Injury/Illness (Reason for Referral): 
Julieann Meckel is a 26 y.o. female with a past medical history of IV drug use who presents to the ER with report of 3 days of feeling chills and body aches all over. She denies any shana fevers, nausea, vomiting, dysuria, cough, congestion. Severe sepsis ([address] (5/9/2019) Vertebral Osteomyelitis. Active Problems: 
  IVDU (intravenous drug user) (2/1/2019) UTI (urinary tract infection) (5/9/2019) History of pulmonary embolism (5/9/2019) Hypokalemia (5/9/2019) Past Medical History/Comorbidities: Ms. Shanelle Verdin  has no past medical history on file. Ms. Shanelle Verdin  has a past surgical history that includes hx gyn; hx tubal ligation (Bilateral); hx  section; and hx orthopaedic (2018). Social History/Living Environment:  
Home Environment: Private residence # Steps to Enter: 5 One/Two Story Residence: Two story, live on 1st floor Living Alone: No 
Support Systems: Friends \ neighbors Patient Expects to be Discharged to[de-identified] Private residence Current DME Used/Available at Home: None Prior Level of Function/Work/Activity: 
Patient reports she lives in a 2 story residence with her friend and her PLOF was independent without an assistive device. Number of Personal Factors/Comorbidities that affect the Plan of Care: 1-2: MODERATE COMPLEXITY EXAMINATION:  
Most Recent Physical Functioning:  
Gross Assessment: 
  
         
  
Posture: 
  
Balance: 
  Bed Mobility: 
Rolling: Modified independent Supine to Sit: Modified independent Sit to Supine: Independent Wheelchair Mobility: 
  
Transfers: 
Sit to Stand: Stand-by assistance Stand to Sit: Stand-by assistance Gait: 
  
Base of Support: Narrowed Speed/Ada: Delayed;Pace decreased (<100 feet/min) Step Length: Left shortened;Right shortened Gait Abnormalities: Decreased step clearance Distance (ft): 100 Feet (ft) Assistive Device: Walker, rolling Ambulation - Level of Assistance: Stand-by assistance;Contact guard assistance Interventions: Safety awareness training;Verbal cues Body Structures Involved: 1. Metabolic 2. Endocrine Body Functions Affected: 1. Neuromusculoskeletal 
2. Movement Related Activities and Participation Affected: 1. General Tasks and Demands 2. Mobility 3. Self Care Number of elements that affect the Plan of Care: 3: MODERATE COMPLEXITY CLINICAL PRESENTATION:  
Presentation: Evolving clinical presentation with changing clinical characteristics: MODERATE COMPLEXITY CLINICAL DECISION MAKING:  
[facility] AM-PAC 6 Clicks Basic Mobility Inpatient Short Form How much difficulty does the patient currently have. .. Unable A Lot A Little None 1. Turning over in bed (including adjusting bedclothes, sheets and blankets)? ? 1   ? 2   ? 3   ? 4  
2. Sitting down on and standing up from a chair with arms ( e.g., wheelchair, bedside commode, etc.)   ? 1   ? 2   ? 3   ? 4  
3. Moving from lying on back to sitting on the side of the bed?   ? 1   ? 2   ? 3   ? 4 How much help from another person does the patient currently need. .. Total A Lot A Little None 4. Moving to and from a bed to a chair (including a wheelchair)? ? 1   ? 2   ? 3   ? 4  
5. Need to walk in hospital room? ? 1   ? 2   ? 3   ? 4  
6. Climbing 3-5 steps with a railing? ? 1   ? 2   ? 3   ? 4  
© 2007, Trustees of [facility], under license to [email or web address]. All rights reserved Score:  Initial: 18 Most Recent: X (Date: -- ) Interpretation of Tool:  Represents activities that are increasingly more difficult (i.e. Bed mobility, Transfers, Gait). Medical Necessity:    
· Patient is expected to demonstrate progress in strength and functional technique ·  to increase independence with bed mobility, SPT and gait with LRAD and improve safety during functional activities · . Reason for Services/Other Comments: 
· Patient continues to require skilled intervention due to medical complications · . Use of outcome tool(s) and clinical judgement create a POC that gives a: Questionable prediction of patient's progress: MODERATE COMPLEXITY  
  
 
 
 
TREATMENT:  
(In addition to Assessment/Re-Assessment sessions the following treatments were rendered) Pre-treatment Symptoms/Complaints:  No complaints of pain 
Pain: Initial:  
   Post Session:  Not rated Therapeutic Activity: (    15 minutes):   Therapeutic activities including Bed transfers, Toilet transfers and Ambulation on level ground to improve mobility, strength, balance and endurance. Required minimal Safety awareness training;Verbal cues to promote static and dynamic balance in standing with use of the walker. Braces/Orthotics/Lines/Etc:  
· IV 
· O2 Device: Room air Treatment/Session Assessment:   
· Response to Treatment:  See above. Poor disposition · Interdisciplinary Collaboration:  
o Physical Therapy Assistant 
o Registered Nurse · After treatment position/precautions:  
o Supine in bed 
o Bed in low position 
o Call light within reach 
o RN notified · Compliance with Program/Exercises: Compliant all of the time · Recommendations/Intent for next treatment session: \"Next visit will focus on advancements to more challenging activities and reduction in assistance provided\". Total Treatment Duration: PT Patient Time In/Time Out Time In: 7925 Time Out: 1330 Wilfrido Kruse, PTA

## 2019-05-17 NOTE — PROGRESS NOTES
Patient requested pain medication for back pain. Ultram 50 mg po given per prn order. Continues on room air, call light in reach, will monitor.

## 2019-05-17 NOTE — PROGRESS NOTES
Pt resting in bed. Pt has refused vitals all night. Respirations are even and unlabored on room air. Nafcillin infusing. Pt denies pain at this time. Call light within reach, bed low and locked. Will give report to oncoming RN.

## 2019-05-17 NOTE — INTERDISCIPLINARY ROUNDS
Interdisciplinary team rounds were held 5/17/2019 with the following team members:Care Management, Physical Therapy, Physician and Clinical Coordinator and the patient. Plan of care discussed. See clinical pathway and/or care plan for interventions and desired outcomes.

## 2019-05-17 NOTE — PROGRESS NOTES
Infectious Disease Progress Note Today's Date: 2019 Admit Date: 2019 Impression: · MSSA bacteremia, R-clindamycin, (, ), repeat culture  pending; TTE, TC negative · Back pain: MRI with concern for septic facet arthritis T12-L5 · Pulmonary cavitary lesions - likely septic emboli - largest 3cm · Hep C ab positive (2019) · Volume overload · Diarrhea - likely withdrawal vs nafcillin Plan: · Switch to ancef - this may help with both diarrhea and volume overlaod - eot 19 prelim (6 weeks from 1st neg culture on  - still pending · Order HCV RNA - if positive, send referal to [facility] · Start methadone 10mg bid Options for Recovery and HCV treatment would be [facility] recovery clinic [phone] - suboxone + HCV rx [facility]- HCV FAVOR for recovery Anti-infectives: · Nafcillin ( - . Ancef  - current · Vancomycin (, ) · Ceftriaxone ( - 5/10, -) · Zosyn () Subjective:  
Feels terrible, wants to go get a hit of heroin, used to do 3g/day. Diarrhea still there No Known Allergies Review of Systems:  A comprehensive review of systems was negative except for that written in the History of Present Illness. Objective:  
 
Visit Vitals /80 Pulse 89 Temp 98.2 °F (36.8 °C) Resp 21 Ht 5' 3\" (1.6 m) Wt 78.5 kg (173 lb 1.6 oz) SpO2 95% Breastfeeding? Yes  
BMI 30.66 kg/m² Temp (24hrs), Av °F (37.2 °C), Min:98.2 °F (36.8 °C), Max:99.7 °F (37.6 °C) Lines:  Peripheral IV:    
 
Physical Exam:   
General:  Cooperative, appears ill and uncomfortable, appears stated age Eyes:  Sclera anicteric, not injected and no drainage Mouth/Throat: Mucosa moist, op clear, multiple broken and missing teeth Lungs:   Posterior lungs without audible wheezes, room air and no increased work of breathing CV:  Regular rate and rhythm, no audible murmur Abdomen:   Soft, non tender, non distended, active bowel sounds Extremities: No cyanosis or edema Skin: Multiple tattoos and piercings, no rash Psych: Alert and oriented, appropriate Data Review: CBC: 
No results for input(s): WBC, GRANS, MONOS, EOS, ANEU, ABL, HGB, HCT, PLT, HGBEXT, HCTEXT, PLTEXT, HGBEXT, HCTEXT, PLTEXT in the last 72 hours. No lab exists for component: LYMPHS,  MAILE BMP: 
No results for input(s): CREA, BUN, NA, K, CL, CO2, AGAP, GLU in the last 72 hours. LFTS: 
No results for input(s): TBILI, ALT, SGOT, AP, TP, ALB in the last 72 hours. Microbiology:  
 
All Micro Results Procedure Component Value Units Date/Time CULTURE, BLOOD [[id number]] Collected:  05/14/19 3371 Order Status:  Completed Specimen:  Blood Updated:  05/17/19 9592 Special Requests: LEFT HAND Culture result: NO GROWTH 3 DAYS     
 CULTURE, BLOOD [[id number]]  (Abnormal) Collected:  05/11/19 0854 Order Status:  Completed Specimen:  Blood Updated:  05/14/19 7943 Special Requests: --     
  RIGHT 
ARM 
  
  GRAM STAIN    
  GRAM POS COCCI IN CLUSTERS  
     
   PEDIATRIC BOTTLE     
      
  CRITICAL RESULT NOT CALLED DUE TO PREVIOUS NOTIFICATION OF CRITICAL RESULT WITHIN THE LAST 24 HOURS. Culture result: STAPHYLOCOCCUS AUREUS     
   FOR SUSCEPTIBILITY REFER TO [id number] CULTURE, BLOOD [[id number]]  (Abnormal) Collected:  05/11/19 0854 Order Status:  Completed Specimen:  Blood Updated:  05/14/19 0186 Special Requests: --     
  LEFT 
HAND 
  
  GRAM STAIN GRAM POSITIVE COCCI     
   PEDIATRIC BOTTLE RESULTS VERIFIED, PHONED TO AND READ BACK BY Eric Davis @1008 05/12/2019 [facility] Culture result: STAPHYLOCOCCUS AUREUS     
   FOR SUSCEPTIBILTIY REFER TO [address] NO [id number] CULTURE, BLOOD [[id number]]  (Abnormal) Collected:  05/09/19 1822 Order Status:  Completed Specimen:  Whole Blood Updated:  05/14/19 9181 Special Requests: --     
  RIGHT 
FOREARM 
  
  GRAM STAIN    
  GRAM POS COCCI IN CLUSTERS  
     
      
  AEROBIC AND ANAEROBIC BOTTLES  
     
      
  CRITICAL RESULT NOT CALLED DUE TO PREVIOUS NOTIFICATION OF CRITICAL RESULT WITHIN THE LAST 24 HOURS. Culture result: STAPHYLOCOCCUS AUREUS     
   FOR SUSCEPTIBILITY REFER TO [address] [place] [address] Drive NO [id number] CULTURE, BLOOD [[id number]]  (Abnormal)  (Susceptibility) Collected:  05/09/19 1903 Order Status:  Completed Specimen:  Whole Blood Updated:  05/14/19 1722 Special Requests: --     
  RIGHT Antecubital 
  
  GRAM STAIN    
  GRAM POS COCCI IN CLUSTERS  
     
      
  AEROBIC AND ANAEROBIC BOTTLES RESULTS VERIFIED, PHONED TO AND READ BACK BY Bill Urbina, RN @ 7042 ON 5/10/19 BY ADS Culture result: STAPHYLOCOCCUS AUREUS     
      
  MRSA target DNA sequences not detected: SA target DNA sequence detected within the sample. Test performed by PCR  Culture/Sensitivity to follow CULTURE, URINE [[id number]]  (Abnormal)  (Susceptibility) Collected:  05/09/19 2027 Order Status:  Completed Specimen:  Urine from Clean catch Updated:  05/13/19 0285 Special Requests: NO SPECIAL REQUESTS Culture result:    
  >100,000 COLONIES/mL ESCHERICHIA COLI  
     
      
  10,000 to 50,000 COLONIES/mL STAPHYLOCOCCUS AUREUS Imaging:  
Reviewed: MRI 5/14/19 C-spine IMPRESSION: 
1. Mild spinal stenosis at C2-C3 and C3-C4. 2. Mild to moderate spinal stenosis at C4-C5. T-spine IMPRESSION: 
  
1. Bilateral pleural effusions with cavitary lung lesions. Correlate with 
pulmonary imaging. 
  
2. No significant thoracic spinal stenosis or findings of discitis. 
  
2. Edema-like signal around the right-sided articular facet at T12-L1. Facet arthritis including septic arthritis could account for this finding. See MRI lumbar spine for further description. L-spine MPRESSION: 
 1. Abnormal enhancement posterior to the articular facet joints at the T12-L1, L3-L4 and L4-L5 levels. Findings are concerning for septic facet arthritis. See 
above description. Similar findings are present around the right SI joint which 
is likely infected as well. 
  
2. Minimal enhancement along the inferior endplate of L1. This may be 
degenerative in etiology. Consider short follow-up MR imaging of the lumbar 
spine in several weeks for further assessment. CXR 5/13/19 IMPRESSION: New faint infiltrate right base, possibly lobar pneumonia. Left lung 
infiltrate is cleared. Signed By: Anabelle Kauffman MD   
 May 17, 2019

## 2019-05-17 NOTE — PROGRESS NOTES
Patient is continuing on IV Abx. End of treatment is 6/24/2019. As she is a known IV drug abuser, she will not be discharged home until IV Abx therapy is completed. Case Management will continue to follow. Care Management Interventions PCP Verified by CM: No 
Transition of Care Consult (CM Consult): Discharge Planning Discharge Durable Medical Equipment: No 
Physical Therapy Consult: No 
Occupational Therapy Consult: No 
Speech Therapy Consult: No 
Current Support Network: Own Home Confirm Follow Up Transport: Self Plan discussed with Pt/Family/Caregiver: Yes Freedom of Choice Offered: Yes Discharge Location Discharge Placement: Home

## 2019-05-18 LAB
MM INDURATION POC: 0 MM (ref 0–5)
PPD POC: NEGATIVE NEGATIVE

## 2019-05-18 PROCEDURE — 87522 HEPATITIS C REVRS TRNSCRPJ: CPT

## 2019-05-18 PROCEDURE — 36415 COLL VENOUS BLD VENIPUNCTURE: CPT

## 2019-05-18 PROCEDURE — 65270000029 HC RM PRIVATE

## 2019-05-18 PROCEDURE — 74011250637 HC RX REV CODE- 250/637: Performed by: HOSPITALIST

## 2019-05-18 PROCEDURE — 74011250637 HC RX REV CODE- 250/637: Performed by: INTERNAL MEDICINE

## 2019-05-18 PROCEDURE — 74011250637 HC RX REV CODE- 250/637: Performed by: FAMILY MEDICINE

## 2019-05-18 PROCEDURE — 74011250636 HC RX REV CODE- 250/636: Performed by: INTERNAL MEDICINE

## 2019-05-18 RX ADMIN — METHADONE HYDROCHLORIDE 10 MG: 10 TABLET ORAL at 10:16

## 2019-05-18 RX ADMIN — Medication 5 ML: at 05:18

## 2019-05-18 RX ADMIN — Medication 1 AMPULE: at 10:17

## 2019-05-18 RX ADMIN — Medication 5 ML: at 13:07

## 2019-05-18 RX ADMIN — Medication 2 G: at 13:07

## 2019-05-18 RX ADMIN — Medication 2 G: at 21:21

## 2019-05-18 RX ADMIN — RIVAROXABAN 15 MG: 15 TABLET, FILM COATED ORAL at 17:49

## 2019-05-18 RX ADMIN — TRAMADOL HYDROCHLORIDE 50 MG: 50 TABLET, FILM COATED ORAL at 05:24

## 2019-05-18 RX ADMIN — METHADONE HYDROCHLORIDE 10 MG: 10 TABLET ORAL at 21:21

## 2019-05-18 RX ADMIN — TRAMADOL HYDROCHLORIDE 50 MG: 50 TABLET, FILM COATED ORAL at 18:31

## 2019-05-18 RX ADMIN — Medication 1 AMPULE: at 21:21

## 2019-05-18 RX ADMIN — RIVAROXABAN 15 MG: 15 TABLET, FILM COATED ORAL at 10:17

## 2019-05-18 RX ADMIN — Medication 2 G: at 05:18

## 2019-05-18 RX ADMIN — Medication 10 ML: at 21:21

## 2019-05-18 NOTE — PROGRESS NOTES
HOSPITALIST 
NAME:  Tonya Fall Age:  26 y.o. 
:   1985 MRN:   [id number] PCP: None Consulting MD: Treatment Team: Attending Provider: Eugenio Mariee MD; Care Manager: Brittany Leavitt RN; Utilization Review: Chloe Segovia RN; Consulting Provider: Alex Serna MD; Consulting Provider: Steven Platt MD 
 
CHIEF COMPLAINT: chills, body aches 
 
subj  
 
a 26 y.o. female with a past medical history of IV drug use who presents to the ER with report of 3 days of feeling chills and body aches all over. She denies any shana fevers, nausea, vomiting, dysuria, cough, congestion. Today, no fever, no ac events, localizing some lower back pain- has mild cough Objective:  
 
Visit Vitals /79 Pulse 91 Temp 98.8 °F (37.1 °C) Resp 20 Ht 5' 3\" (1.6 m) Wt 78.5 kg (173 lb 1.6 oz) SpO2 96% Breastfeeding? Yes  
BMI 30.66 kg/m² Temp (24hrs), Av.1 °F (37.3 °C), Min:98.8 °F (37.1 °C), Max:99.3 °F (37.4 °C) Oxygen Therapy O2 Sat (%): 96 % (19 1139) Pulse via Oximetry: 93 beats per minute (19 1639) O2 Device: Room air (19 0815) O2 Flow Rate (L/min): 2 l/min (05/15/19 1207) Physical Exam: 
General:    AAO3 Lungs:   Clear to auscultation bilaterally Heart:   Regular rate and rhythm, without murmurs, rubs, or gallops. Abdomen:   Soft, non-tender, non-distended with normoactive bowel sounds. Genitourinary: No tenderness over the bladder or bilateral CVAs. Extremities: Without clubbing, cyanosis, or edema. Skin:     Normal color, texture, and turgor. No rashes, lesions, or jaundice. Neurologic: grossly intact Data Review:  
Recent Results (from the past 24 hour(s)) HCV RT-PCR, QUANT (NON-GRAPH) Collection Time: 19  4:54 AM  
Result Value Ref Range Hepatitis C Quantitation PENDING IU/mL  
 HCV log10 PENDING log10 IU/mL Serial monitoring PENDING Imaging Mickleton Aditi /Studies: No results found. Assessment and Plan: 1- Bacteremia, MSSA w/ L-T septic arthritis/ OM dt IVD use 2- Ac UTI, urine E coli Rx: On [address] [facility] per ID Follow blood CS final results, repeat 2nd Cs positive, 3rd still negative Ultram 
PT consulted K replacement as needed Dispo: need long term hospitalization for IV abx Signed By: Jos Lopez MD   
 May 18, 2019

## 2019-05-18 NOTE — PROGRESS NOTES
HOSPITALIST 
NAME:  Danika Mortensen Age:  26 y.o. 
:   1985 MRN:   [id number] PCP: None Consulting MD: Treatment Team: Attending Provider: Adry Malloy MD; Care Manager: Juliette Garvin RN; Utilization Review: Cora Polanco RN; Consulting Provider: Varun Mancilla MD; Consulting Provider: Giuseppe Mooney MD 
 
CHIEF COMPLAINT: chills, body aches 
 
subj  
 
a 26 y.o. female with a past medical history of IV drug use who presents to the ER with report of 3 days of feeling chills and body aches all over. She denies any shana fevers, nausea, vomiting, dysuria, cough, congestion. Today, no fever, no ac events, localizing some lower back pain- has mild cough, gets OOB Objective:  
 
Visit Vitals /79 Pulse 91 Temp 98.8 °F (37.1 °C) Resp 20 Ht 5' 3\" (1.6 m) Wt 78.5 kg (173 lb 1.6 oz) SpO2 96% Breastfeeding? Yes  
BMI 30.66 kg/m² Temp (24hrs), Av.1 °F (37.3 °C), Min:98.8 °F (37.1 °C), Max:99.3 °F (37.4 °C) Oxygen Therapy O2 Sat (%): 96 % (19 1139) Pulse via Oximetry: 93 beats per minute (19 1639) O2 Device: Room air (19 0815) O2 Flow Rate (L/min): 2 l/min (05/15/19 1207) Physical Exam: 
General:    AAO3 Lungs:   Clear to auscultation bilaterally Heart:   Regular rate and rhythm, without murmurs, rubs, or gallops. Abdomen:   Soft, non-tender, non-distended with normoactive bowel sounds. Genitourinary: No tenderness over the bladder or bilateral CVAs. Extremities: Without clubbing, cyanosis, or edema. Skin:     Normal color, texture, and turgor. No rashes, lesions, or jaundice. Neurologic: grossly intact Data Review:  
Recent Results (from the past 24 hour(s)) HCV RT-PCR, QUANT (NON-GRAPH) Collection Time: 19  4:54 AM  
Result Value Ref Range Hepatitis C Quantitation PENDING IU/mL  
 HCV log10 PENDING log10 IU/mL Serial monitoring PENDING Imaging Bonifacio Atwood /Studies: No results found. Assessment and Plan: 1- Bacteremia, MSSA w/ L-T septic arthritis/ OM dt IVD use 2- Ac UTI, urine E coli [facility] Follow blood CS final results, repeat 2nd Cs positive, 3rd still negative Ultram 
PT consulted K replacement as needed Dispo: need long term hospitalization for IV abx Signed By: Gilbert Mora MD   
 May 18, 2019

## 2019-05-18 NOTE — PROGRESS NOTES
Patient requested pain medication for back pain. Ultram 50 mg po given per prn order. Easily agitated with staff, has refused vital signs to be taken twice tonight. Resting quietly at this time.

## 2019-05-18 NOTE — PROGRESS NOTES
Received report from [facility]. Patient asleep in bed. Respirations present. No signs of distress. Bed low and locked. Call light within reach. Will continue to monitor.

## 2019-05-18 NOTE — PROGRESS NOTES
Bedside report received from night nurse Lianne Zheng Assessment done as noted  Respiration even and unlabored 20/min; denies pain or nausea at present. Encouraged to call with needs.

## 2019-05-19 LAB
BACTERIA SPEC CULT: NORMAL
HCV RNA SERPL NAA+PROBE-ACNC: NORMAL IU/ML
MM INDURATION POC: 0 MM (ref 0–5)
PPD POC: NEGATIVE NEGATIVE
SERIAL MONITORING: NORMAL
SERVICE CMNT-IMP: NORMAL

## 2019-05-19 PROCEDURE — 74011250637 HC RX REV CODE- 250/637: Performed by: INTERNAL MEDICINE

## 2019-05-19 PROCEDURE — 74011250636 HC RX REV CODE- 250/636: Performed by: INTERNAL MEDICINE

## 2019-05-19 PROCEDURE — 74011250637 HC RX REV CODE- 250/637: Performed by: FAMILY MEDICINE

## 2019-05-19 PROCEDURE — 74011250637 HC RX REV CODE- 250/637: Performed by: HOSPITALIST

## 2019-05-19 PROCEDURE — 65270000029 HC RM PRIVATE

## 2019-05-19 RX ORDER — CODEINE PHOSPHATE AND GUAIFENESIN 10; 100 MG/5ML; MG/5ML
10 SOLUTION ORAL
Status: DISCONTINUED | OUTPATIENT
Start: 2019-05-19 | End: 2019-05-26

## 2019-05-19 RX ADMIN — Medication 1 AMPULE: at 08:55

## 2019-05-19 RX ADMIN — METHADONE HYDROCHLORIDE 10 MG: 10 TABLET ORAL at 17:55

## 2019-05-19 RX ADMIN — TRAMADOL HYDROCHLORIDE 50 MG: 50 TABLET, FILM COATED ORAL at 21:15

## 2019-05-19 RX ADMIN — Medication 5 ML: at 14:53

## 2019-05-19 RX ADMIN — Medication 2 G: at 21:08

## 2019-05-19 RX ADMIN — Medication 1 AMPULE: at 21:09

## 2019-05-19 RX ADMIN — RIVAROXABAN 15 MG: 15 TABLET, FILM COATED ORAL at 17:55

## 2019-05-19 RX ADMIN — METHADONE HYDROCHLORIDE 10 MG: 10 TABLET ORAL at 08:55

## 2019-05-19 RX ADMIN — Medication 10 ML: at 21:09

## 2019-05-19 RX ADMIN — Medication 2 G: at 05:20

## 2019-05-19 RX ADMIN — Medication 2 G: at 14:53

## 2019-05-19 RX ADMIN — Medication 5 ML: at 05:20

## 2019-05-19 RX ADMIN — RIVAROXABAN 15 MG: 15 TABLET, FILM COATED ORAL at 08:55

## 2019-05-19 NOTE — PROGRESS NOTES
Received report from [facility]. Patient awake resting in bed. Respirations present. On room air. No signs of distress. Bed low and locked. Call light within reach. Will continue to monitor.

## 2019-05-19 NOTE — PROGRESS NOTES
Please advise: patient was started on amlodipine 5mg 2 months ago and has been having swollen legs, between knees and ankles, also having pain while standing.    Resting quietly at present. NAD noted. To report off to on coming nurse.

## 2019-05-19 NOTE — PROGRESS NOTES
HOSPITALIST 
NAME:  Harinder Urbina Age:  26 y.o. 
:   1985 MRN:   [id number] PCP: None Consulting MD: Treatment Team: Attending Provider: Jose George MD; Care Manager: Christiane Spencer RN; Utilization Review: Malcom Robledo, PRABHA; Consulting Provider: Jocy Cutler MD; Consulting Provider: Tammie Zavaleta MD 
 
CHIEF COMPLAINT: chills, body aches 
 
subj  
 
a 26 y.o. female with a past medical history of IV drug use who presents to the ER with report of 3 days of feeling chills and body aches all over. She denies any shana fevers, nausea, vomiting, dysuria, cough, congestion. Today, no fever, no ac events, localizing some lower back pain- has mild cough persist 
 
Objective:  
 
Visit Vitals /72 Pulse (!) 109 Temp 98.5 °F (36.9 °C) Resp 19 Ht 5' 3\" (1.6 m) Wt 78.5 kg (173 lb 1.6 oz) SpO2 96% Breastfeeding? Yes  
BMI 30.66 kg/m² Temp (24hrs), Av.9 °F (37.2 °C), Min:98.5 °F (36.9 °C), Max:99.8 °F (37.7 °C) Oxygen Therapy O2 Sat (%): 96 % (19 0710) Pulse via Oximetry: 93 beats per minute (19 1639) O2 Device: Room air (19 0815) O2 Flow Rate (L/min): 2 l/min (05/15/19 1207) Physical Exam: 
General:    AAO3 Lungs:   Clear to auscultation bilaterally Heart:   Regular rate and rhythm, without murmurs, rubs, or gallops. Abdomen:   Soft, non-tender, non-distended with normoactive bowel sounds. Genitourinary: No tenderness over the bladder or bilateral CVAs. Extremities: Without clubbing, cyanosis, or edema. Skin:     Normal color, texture, and turgor. No rashes, lesions, or jaundice. Neurologic: grossly intact Data Review: No results found for this or any previous visit (from the past 24 hour(s)). Imaging Harlin Shannon /Studies: No results found. Assessment and Plan: 1- Bacteremia, MSSA w/ L-T septic arthritis/ OM dt IVD use 2- Ac UTI, urine E coli Rx: On [address] [facility] per ID  
 Follow blood CS final results, repeat 2nd Cs positive, 3rd still negative Ultram 
Cough control PT consulted K replacement as needed Dispo: need long term hospitalization for IV abx Signed By: Rich Breen MD   
 May 19, 2019

## 2019-05-19 NOTE — PROGRESS NOTES
Bedside report received from night nurse Fatemeh. Assessment done as noted  Respiration even and unlabored 20/min; denies pain or nausea at present. Encouraged to call with needs.

## 2019-05-20 LAB
MM INDURATION POC: 0 MM (ref 0–5)
PPD POC: NEGATIVE NEGATIVE

## 2019-05-20 PROCEDURE — 74011250637 HC RX REV CODE- 250/637: Performed by: INTERNAL MEDICINE

## 2019-05-20 PROCEDURE — 74011250637 HC RX REV CODE- 250/637: Performed by: FAMILY MEDICINE

## 2019-05-20 PROCEDURE — 74011250636 HC RX REV CODE- 250/636: Performed by: INTERNAL MEDICINE

## 2019-05-20 PROCEDURE — 65270000029 HC RM PRIVATE

## 2019-05-20 PROCEDURE — 74011250637 HC RX REV CODE- 250/637: Performed by: HOSPITALIST

## 2019-05-20 RX ADMIN — Medication 2 G: at 13:05

## 2019-05-20 RX ADMIN — Medication 2 G: at 22:05

## 2019-05-20 RX ADMIN — GUAIFENESIN AND CODEINE PHOSPHATE 10 ML: 10; 100 LIQUID ORAL at 22:05

## 2019-05-20 RX ADMIN — METHADONE HYDROCHLORIDE 10 MG: 10 TABLET ORAL at 09:20

## 2019-05-20 RX ADMIN — RIVAROXABAN 15 MG: 15 TABLET, FILM COATED ORAL at 09:21

## 2019-05-20 RX ADMIN — RIVAROXABAN 15 MG: 15 TABLET, FILM COATED ORAL at 17:08

## 2019-05-20 RX ADMIN — Medication 5 ML: at 13:06

## 2019-05-20 RX ADMIN — Medication 2 G: at 05:33

## 2019-05-20 RX ADMIN — Medication 10 ML: at 05:33

## 2019-05-20 RX ADMIN — GUAIFENESIN AND CODEINE PHOSPHATE 10 ML: 10; 100 LIQUID ORAL at 09:24

## 2019-05-20 RX ADMIN — GUAIFENESIN AND CODEINE PHOSPHATE 10 ML: 10; 100 LIQUID ORAL at 13:05

## 2019-05-20 RX ADMIN — Medication 1 AMPULE: at 09:21

## 2019-05-20 RX ADMIN — Medication 1 AMPULE: at 22:05

## 2019-05-20 RX ADMIN — TRAMADOL HYDROCHLORIDE 50 MG: 50 TABLET, FILM COATED ORAL at 13:05

## 2019-05-20 RX ADMIN — Medication 10 ML: at 22:05

## 2019-05-20 RX ADMIN — GUAIFENESIN AND CODEINE PHOSPHATE 10 ML: 10; 100 LIQUID ORAL at 17:08

## 2019-05-20 RX ADMIN — METHADONE HYDROCHLORIDE 10 MG: 10 TABLET ORAL at 17:08

## 2019-05-20 RX ADMIN — TRAMADOL HYDROCHLORIDE 50 MG: 50 TABLET, FILM COATED ORAL at 22:05

## 2019-05-20 NOTE — PROGRESS NOTES
HOSPITALIST 
NAME:  Debo Nazario Age:  26 y.o. 
:   1985 MRN:   [id number] PCP: None Consulting MD: Treatment Team: Attending Provider: Elana Foster MD; Care Manager: Rodriguez Steward, PRABHA; Utilization Review: Kenzie Avina RN; Consulting Provider: Martin Tapia MD; Consulting Provider: Farhana Castro MD; Physical Therapy Assistant: Ligia Power PTA 
 
CHIEF COMPLAINT: chills, body aches 
 
subj  
 
a 26 y.o. female with a past medical history of IV drug use who presents to the ER with report of 3 days of feeling chills and body aches all over. She denies any shana fevers, nausea, vomiting, dysuria, cough, congestion. Today, no fever, no ac events, localizing some lower back pain/ improving- has mild cough persist 
 
Objective:  
 
Visit Vitals /64 Pulse (!) 111 Temp 98.5 °F (36.9 °C) Resp 18 Ht 5' 3\" (1.6 m) Wt 66.2 kg (145 lb 14.4 oz) SpO2 97% Breastfeeding? Yes  
BMI 25.85 kg/m² Temp (24hrs), Av.9 °F (37.2 °C), Min:98.5 °F (36.9 °C), Max:99.4 °F (37.4 °C) Oxygen Therapy O2 Sat (%): 97 % (19 1522) Pulse via Oximetry: 93 beats per minute (19 1639) O2 Device: Room air (19 0750) O2 Flow Rate (L/min): 2 l/min (05/15/19 1207) Physical Exam: 
General:    AAO3 Lungs:   Clear to auscultation bilaterally Heart:   Regular rate and rhythm, without murmurs, rubs, or gallops. Abdomen:   Soft, non-tender, non-distended with normoactive bowel sounds. Genitourinary: No tenderness over the bladder or bilateral CVAs. Extremities: Without clubbing, cyanosis, or edema. Skin:     Normal color, texture, and turgor. No rashes, lesions, or jaundice. Neurologic: grossly intact Data Review: No results found for this or any previous visit (from the past 24 hour(s)). Imaging [employer] /Studies: No results found. Assessment and Plan: 1- Bacteremia, MSSA w/ L-T septic arthritis/ OM dt IVD use 2- Ac UTI, urine E coli Rx: 
 On [address] [facility] per ID Follow blood CS final results, repeat 2nd Cs positive, 3rd still negative Ultram 
Cough control as iordered PT consulted K replacement as needed Dispo: need long term hospitalization for IV abx Signed By: Katrina Simon MD   
 May 20, 2019

## 2019-05-20 NOTE — PROGRESS NOTES
Infectious Disease Progress Note Today's Date: 2019 Admit Date: 2019 Impression: · MSSA bacteremia, R-clindamycin, (, ), repeat culture  pending; TTE, TC negative · Back pain: MRI with concern for septic facet arthritis T12-L5 · Pulmonary cavitary lesions - likely septic emboli - largest 3cm · Hep C ab positive (2019) · Volume overload · Diarrhea - likely withdrawal vs nafcillin · IVDA Plan:  
· Continue Ancef  X 6 weeks- eot 19 · HCV RNA -  negative · Start methadone 10mg bid · Not OPAT candidate due to IVDA Options for Recovery and HCV treatment would be [facility] recovery clinic [phone] - suboxone + HCV rx [facility]- HCV FAVOR for recovery Anti-infectives: · Nafcillin ( - . Ancef  - current · Vancomycin (, ) · Ceftriaxone ( - 5/10, -) · Zosyn () Subjective:  
Feels terrible, still c/o hip/back pain. No worse or better. No N/V/D/F/C. No Known Allergies Review of Systems:  A comprehensive review of systems was negative except for that written in the History of Present Illness. Objective:  
 
Visit Vitals /67 Pulse (!) 105 Temp 98.7 °F (37.1 °C) Resp 18 Ht 5' 3\" (1.6 m) Wt 66.2 kg (145 lb 14.4 oz) SpO2 96% Breastfeeding? Yes  
BMI 25.85 kg/m² Temp (24hrs), Av.7 °F (37.1 °C), Min:98.2 °F (36.8 °C), Max:99.1 °F (37.3 °C) Lines:  RUE PICC Physical Exam:   
General:  Cooperative, appears ill and uncomfortable, appears stated age Eyes:  Sclera anicteric, not injected and no drainage Mouth/Throat: Mucosa moist, op clear, multiple broken and missing teeth Lungs:   Posterior lungs without audible wheezes, room air and no increased work of breathing CV:  Regular rate and rhythm, no audible murmur Abdomen:   Soft, non tender, non distended, active bowel sounds Extremities: No cyanosis or edema Skin: Multiple tattoos and piercings, no rash Psych: Alert and oriented, appropriate Data Review: CBC: 
No results for input(s): WBC, GRANS, MONOS, EOS, ANEU, ABL, HGB, HCT, PLT, HGBEXT, HCTEXT, PLTEXT, HGBEXT, HCTEXT, PLTEXT in the last 72 hours. No lab exists for component: LYMPHS,  MAILE BMP: 
No results for input(s): CREA, BUN, NA, K, CL, CO2, AGAP, GLU in the last 72 hours. LFTS: 
No results for input(s): TBILI, ALT, SGOT, AP, TP, ALB in the last 72 hours. Microbiology:  
 
All Micro Results Procedure Component Value Units Date/Time CULTURE, BLOOD [[id number]] Collected:  05/14/19 7090 Order Status:  Completed Specimen:  Blood Updated:  05/19/19 8163 Special Requests: LEFT HAND Culture result: NO GROWTH 5 DAYS     
 CULTURE, BLOOD [[id number]]  (Abnormal) Collected:  05/11/19 0854 Order Status:  Completed Specimen:  Blood Updated:  05/14/19 0775 Special Requests: --     
  RIGHT 
ARM 
  
  GRAM STAIN    
  GRAM POS COCCI IN CLUSTERS  
     
   PEDIATRIC BOTTLE     
      
  CRITICAL RESULT NOT CALLED DUE TO PREVIOUS NOTIFICATION OF CRITICAL RESULT WITHIN THE LAST 24 HOURS. Culture result: STAPHYLOCOCCUS AUREUS     
   FOR SUSCEPTIBILITY REFER TO [id number] CULTURE, BLOOD [[id number]]  (Abnormal) Collected:  05/11/19 0854 Order Status:  Completed Specimen:  Blood Updated:  05/14/19 6814 Special Requests: --     
  LEFT 
HAND 
  
  GRAM STAIN GRAM POSITIVE COCCI     
   PEDIATRIC BOTTLE RESULTS VERIFIED, PHONED TO AND READ BACK BY Harvinder Hilliard @1084 05/12/2019 [facility] Culture result: STAPHYLOCOCCUS AUREUS     
   FOR SUSCEPTIBILTIY REFER TO [address] NO [id number] CULTURE, BLOOD [[id number]]  (Abnormal) Collected:  05/09/19 1822 Order Status:  Completed Specimen:  Whole Blood Updated:  05/14/19 1426   Special Requests: --     
  RIGHT 
FOREARM 
  
  GRAM STAIN    
  GRAM POS COCCI IN CLUSTERS  
     
      
  AEROBIC AND ANAEROBIC BOTTLES  
     
      
 CRITICAL RESULT NOT CALLED DUE TO PREVIOUS NOTIFICATION OF CRITICAL RESULT WITHIN THE LAST 24 HOURS. Culture result: STAPHYLOCOCCUS AUREUS     
   FOR SUSCEPTIBILITY REFER TO [facility] NO [id number] CULTURE, BLOOD [[id number]]  (Abnormal)  (Susceptibility) Collected:  05/09/19 1903 Order Status:  Completed Specimen:  Whole Blood Updated:  05/14/19 0458 Special Requests: --     
  RIGHT Antecubital 
  
  GRAM STAIN    
  GRAM POS COCCI IN CLUSTERS  
     
      
  AEROBIC AND ANAEROBIC BOTTLES RESULTS VERIFIED, PHONED TO AND READ BACK BY Sindi Dickerson RN @ 9253 ON 5/10/19 BY ADS Culture result: STAPHYLOCOCCUS AUREUS     
      
  MRSA target DNA sequences not detected: SA target DNA sequence detected within the sample. Test performed by PCR  Culture/Sensitivity to follow CULTURE, URINE [[id number]]  (Abnormal)  (Susceptibility) Collected:  05/09/19 2027 Order Status:  Completed Specimen:  Urine from Clean catch Updated:  05/13/19 8189 Special Requests: NO SPECIAL REQUESTS Culture result:    
  >100,000 COLONIES/mL ESCHERICHIA COLI  
     
      
  10,000 to 50,000 COLONIES/mL STAPHYLOCOCCUS AUREUS Imaging:  
Reviewed: MRI 5/14/19 C-spine IMPRESSION: 
1. Mild spinal stenosis at C2-C3 and C3-C4. 2. Mild to moderate spinal stenosis at C4-C5. T-spine IMPRESSION: 
  
1. Bilateral pleural effusions with cavitary lung lesions. Correlate with 
pulmonary imaging. 
  
2. No significant thoracic spinal stenosis or findings of discitis. 
  
2. Edema-like signal around the right-sided articular facet at T12-L1. Facet arthritis including septic arthritis could account for this finding. See MRI lumbar spine for further description. L-spine MPRESSION: 
1. Abnormal enhancement posterior to the articular facet joints at the T12-L1, L3-L4 and L4-L5 levels. Findings are concerning for septic facet arthritis.  See 
 above description. Similar findings are present around the right SI joint which 
is likely infected as well. 
  
2. Minimal enhancement along the inferior endplate of L1. This may be 
degenerative in etiology. Consider short follow-up MR imaging of the lumbar 
spine in several weeks for further assessment. CXR 5/13/19 IMPRESSION: New faint infiltrate right base, possibly lobar pneumonia. Left lung 
infiltrate is cleared. Signed By: Yanely Zhong NP May 20, 2019

## 2019-05-20 NOTE — PROGRESS NOTES
Robitussin AC 10 ml po given for cough. Respirations even and unlabored. No s/sx distress. No further needs at present.

## 2019-05-21 PROCEDURE — 74011250637 HC RX REV CODE- 250/637: Performed by: HOSPITALIST

## 2019-05-21 PROCEDURE — 74011250637 HC RX REV CODE- 250/637: Performed by: FAMILY MEDICINE

## 2019-05-21 PROCEDURE — 65270000029 HC RM PRIVATE

## 2019-05-21 PROCEDURE — 74011250637 HC RX REV CODE- 250/637: Performed by: INTERNAL MEDICINE

## 2019-05-21 PROCEDURE — 74011250636 HC RX REV CODE- 250/636: Performed by: INTERNAL MEDICINE

## 2019-05-21 RX ADMIN — Medication 2 G: at 05:19

## 2019-05-21 RX ADMIN — GUAIFENESIN AND CODEINE PHOSPHATE 10 ML: 10; 100 LIQUID ORAL at 13:31

## 2019-05-21 RX ADMIN — Medication 10 ML: at 21:38

## 2019-05-21 RX ADMIN — TRAMADOL HYDROCHLORIDE 50 MG: 50 TABLET, FILM COATED ORAL at 21:38

## 2019-05-21 RX ADMIN — Medication 2 G: at 13:31

## 2019-05-21 RX ADMIN — Medication 1 AMPULE: at 08:43

## 2019-05-21 RX ADMIN — Medication 5 ML: at 13:31

## 2019-05-21 RX ADMIN — GUAIFENESIN AND CODEINE PHOSPHATE 10 ML: 10; 100 LIQUID ORAL at 21:37

## 2019-05-21 RX ADMIN — METHADONE HYDROCHLORIDE 10 MG: 10 TABLET ORAL at 08:42

## 2019-05-21 RX ADMIN — RIVAROXABAN 15 MG: 15 TABLET, FILM COATED ORAL at 17:20

## 2019-05-21 RX ADMIN — Medication 10 ML: at 05:19

## 2019-05-21 RX ADMIN — GUAIFENESIN AND CODEINE PHOSPHATE 10 ML: 10; 100 LIQUID ORAL at 17:20

## 2019-05-21 RX ADMIN — METHADONE HYDROCHLORIDE 10 MG: 10 TABLET ORAL at 17:20

## 2019-05-21 RX ADMIN — RIVAROXABAN 15 MG: 15 TABLET, FILM COATED ORAL at 08:43

## 2019-05-21 RX ADMIN — Medication 1 AMPULE: at 21:37

## 2019-05-21 RX ADMIN — TRAMADOL HYDROCHLORIDE 50 MG: 50 TABLET, FILM COATED ORAL at 13:31

## 2019-05-21 RX ADMIN — GUAIFENESIN AND CODEINE PHOSPHATE 10 ML: 10; 100 LIQUID ORAL at 08:42

## 2019-05-21 RX ADMIN — Medication 2 G: at 21:38

## 2019-05-21 NOTE — PROGRESS NOTES
HOSPITALIST    NAME:  Jovi Wilson   Age:  26 y.o.  :   1985   MRN:   [id number]  PCP: None  Consulting MD:  Treatment Team: Attending Provider: Denis Maurice MD; Care Manager: Shahid Kilgore RN; Utilization Review: David Chu RN; Consulting Provider: Yumiko Cavazos MD; Consulting Provider: Earle Pelletier MD; Physical Therapy Assistant: Tammy Garcia PTA    CHIEF COMPLAINT: chills, body aches    subj     a 26 y.o. female with a past medical history of IV drug use who presents to the ER with report of 3 days of feeling chills and body aches all over. She denies any shana fevers, nausea, vomiting, dysuria, cough, congestion. Today, no fever, no ac events, localizing some lower back pain/ improving- has less cough, ambulates    Objective:     Visit Vitals  /70   Pulse (!) 104   Temp 98.5 °F (36.9 °C)   Resp 18   Ht 5' 3\" (1.6 m)   Wt 64.3 kg (141 lb 12.8 oz)   SpO2 97%   Breastfeeding? Yes   BMI 25.12 kg/m²      Temp (24hrs), Av.9 °F (37.2 °C), Min:98.5 °F (36.9 °C), Max:99.4 °F (37.4 °C)    Oxygen Therapy  O2 Sat (%): 97 % (19 0725)  Pulse via Oximetry: 93 beats per minute (19 1639)  O2 Device: Room air (19 0740)  O2 Flow Rate (L/min): 2 l/min (05/15/19 1207)  Physical Exam:  General:    AAO3  Lungs:   Clear to auscultation bilaterally   Heart:   Regular rate and rhythm, without murmurs, rubs, or gallops. Abdomen:   Soft, non-tender, non-distended with normoactive bowel sounds. Genitourinary: No tenderness over the bladder or bilateral CVAs. Extremities: Without clubbing, cyanosis, or edema. Skin:     Normal color, texture, and turgor. No rashes, lesions, or jaundice. Neurologic: grossly intact    Data Review:   No results found for this or any previous visit (from the past 24 hour(s)). Imaging /Procedures /Studies:  No results found.        Assessment and Plan:     1- Bacteremia, MSSA w/ L-T septic arthritis/ OM dt IVD use  2- Ac UTI, urine E coli    Rx:  On PNC per ID   Follow blood CS final results, repeat 2nd Cs positive, 3rd still negative  Ultram  Cough control as iordered  PT consulted  K replacement as needed    Dispo: need long term hospitalization for IV abx    Signed By: Marcos Tay MD     May 21, 2019

## 2019-05-21 NOTE — PROGRESS NOTES
Received report from Nirmal, [facility]. Patient awake in bed. Respirations present. On room air. No signs of distress. Bed low and locked. Call light within reach. No needs expressed at this time. Will continue to monitor.

## 2019-05-21 NOTE — PROGRESS NOTES
Patient continues to refuse to walk with therapy. Will likely check on one more time and if she does not participate will discharge from our services.     Livier Muse PTA

## 2019-05-21 NOTE — PROGRESS NOTES
Received report from Nirmal, [facility]. Patient awake in bed. Respirations present. On room air. No signs of distress. No needs expressed at this time. Bed low and locked. Call light within reach. Will continue to monitor.

## 2019-05-21 NOTE — PROGRESS NOTES
Patient c/o 10/10 back pain. Patient received Tramadol 50 mg tab at 2205. Patient c/o cough. Patient received Robitussin AC 10 ml at 2205.

## 2019-05-22 LAB
ALBUMIN SERPL-MCNC: 2.2 G/DL (ref 3.5–5)
ALBUMIN/GLOB SERPL: 0.4 {RATIO} (ref 1.2–3.5)
ALP SERPL-CCNC: 149 U/L (ref 50–136)
ALT SERPL-CCNC: 43 U/L (ref 12–65)
AST SERPL-CCNC: 25 U/L (ref 15–37)
BASOPHILS # BLD: 0 K/UL (ref 0–0.2)
BASOPHILS NFR BLD: 1 % (ref 0–2)
BILIRUB DIRECT SERPL-MCNC: <0.1 MG/DL
BILIRUB SERPL-MCNC: 0.4 MG/DL (ref 0.2–1.1)
CREAT SERPL-MCNC: 0.54 MG/DL (ref 0.6–1)
CRP SERPL-MCNC: 8.8 MG/DL (ref 0–0.9)
DIFFERENTIAL METHOD BLD: ABNORMAL
EOSINOPHIL # BLD: 0 K/UL (ref 0–0.8)
EOSINOPHIL NFR BLD: 1 % (ref 0.5–7.8)
ERYTHROCYTE [DISTWIDTH] IN BLOOD BY AUTOMATED COUNT: 15 % (ref 11.9–14.6)
GLOBULIN SER CALC-MCNC: 6.2 G/DL (ref 2.3–3.5)
HCT VFR BLD AUTO: 28.7 % (ref 35.8–46.3)
HGB BLD-MCNC: 9.2 G/DL (ref 11.7–15.4)
IMM GRANULOCYTES # BLD AUTO: 0 K/UL (ref 0–0.5)
IMM GRANULOCYTES NFR BLD AUTO: 0 % (ref 0–5)
LYMPHOCYTES # BLD: 1.2 K/UL (ref 0.5–4.6)
LYMPHOCYTES NFR BLD: 20 % (ref 13–44)
MCH RBC QN AUTO: 27.3 PG (ref 26.1–32.9)
MCHC RBC AUTO-ENTMCNC: 32.1 G/DL (ref 31.4–35)
MCV RBC AUTO: 85.2 FL (ref 79.6–97.8)
MONOCYTES # BLD: 0.4 K/UL (ref 0.1–1.3)
MONOCYTES NFR BLD: 7 % (ref 4–12)
NEUTS SEG # BLD: 4.5 K/UL (ref 1.7–8.2)
NEUTS SEG NFR BLD: 72 % (ref 43–78)
NRBC # BLD: 0 K/UL (ref 0–0.2)
PLATELET # BLD AUTO: 604 K/UL (ref 150–450)
PMV BLD AUTO: 8.7 FL (ref 9.4–12.3)
PROT SERPL-MCNC: 8.4 G/DL (ref 6.3–8.2)
RBC # BLD AUTO: 3.37 M/UL (ref 4.05–5.2)
WBC # BLD AUTO: 6.2 K/UL (ref 4.3–11.1)

## 2019-05-22 PROCEDURE — 74011250636 HC RX REV CODE- 250/636: Performed by: INTERNAL MEDICINE

## 2019-05-22 PROCEDURE — 65270000029 HC RM PRIVATE

## 2019-05-22 PROCEDURE — 86140 C-REACTIVE PROTEIN: CPT

## 2019-05-22 PROCEDURE — 80076 HEPATIC FUNCTION PANEL: CPT

## 2019-05-22 PROCEDURE — 82565 ASSAY OF CREATININE: CPT

## 2019-05-22 PROCEDURE — 74011250637 HC RX REV CODE- 250/637: Performed by: INTERNAL MEDICINE

## 2019-05-22 PROCEDURE — 74011250637 HC RX REV CODE- 250/637: Performed by: FAMILY MEDICINE

## 2019-05-22 PROCEDURE — 85025 COMPLETE CBC W/AUTO DIFF WBC: CPT

## 2019-05-22 PROCEDURE — 36415 COLL VENOUS BLD VENIPUNCTURE: CPT

## 2019-05-22 PROCEDURE — 74011250637 HC RX REV CODE- 250/637: Performed by: HOSPITALIST

## 2019-05-22 RX ADMIN — GUAIFENESIN AND CODEINE PHOSPHATE 10 ML: 10; 100 LIQUID ORAL at 23:36

## 2019-05-22 RX ADMIN — Medication 1 AMPULE: at 22:04

## 2019-05-22 RX ADMIN — Medication 10 ML: at 22:04

## 2019-05-22 RX ADMIN — GUAIFENESIN AND CODEINE PHOSPHATE 10 ML: 10; 100 LIQUID ORAL at 05:40

## 2019-05-22 RX ADMIN — GUAIFENESIN AND CODEINE PHOSPHATE 10 ML: 10; 100 LIQUID ORAL at 01:40

## 2019-05-22 RX ADMIN — GUAIFENESIN AND CODEINE PHOSPHATE 10 ML: 10; 100 LIQUID ORAL at 14:52

## 2019-05-22 RX ADMIN — Medication 10 ML: at 14:52

## 2019-05-22 RX ADMIN — ACETAMINOPHEN 650 MG: 325 TABLET, FILM COATED ORAL at 09:31

## 2019-05-22 RX ADMIN — Medication 2 G: at 14:51

## 2019-05-22 RX ADMIN — GUAIFENESIN AND CODEINE PHOSPHATE 10 ML: 10; 100 LIQUID ORAL at 10:01

## 2019-05-22 RX ADMIN — TRAMADOL HYDROCHLORIDE 50 MG: 50 TABLET, FILM COATED ORAL at 20:11

## 2019-05-22 RX ADMIN — TRAMADOL HYDROCHLORIDE 50 MG: 50 TABLET, FILM COATED ORAL at 13:25

## 2019-05-22 RX ADMIN — Medication 2 G: at 05:33

## 2019-05-22 RX ADMIN — Medication 1 AMPULE: at 09:15

## 2019-05-22 RX ADMIN — Medication 10 ML: at 05:33

## 2019-05-22 RX ADMIN — RIVAROXABAN 15 MG: 15 TABLET, FILM COATED ORAL at 17:23

## 2019-05-22 RX ADMIN — Medication 2 G: at 22:03

## 2019-05-22 RX ADMIN — METHADONE HYDROCHLORIDE 10 MG: 10 TABLET ORAL at 17:23

## 2019-05-22 RX ADMIN — GUAIFENESIN AND CODEINE PHOSPHATE 10 ML: 10; 100 LIQUID ORAL at 18:39

## 2019-05-22 RX ADMIN — RIVAROXABAN 15 MG: 15 TABLET, FILM COATED ORAL at 09:15

## 2019-05-22 RX ADMIN — METHADONE HYDROCHLORIDE 10 MG: 10 TABLET ORAL at 09:15

## 2019-05-22 NOTE — PROGRESS NOTES
HOSPITALIST    NAME:  Hazel Monte   Age:  26 y.o.  :   1985   MRN:   [id number]  PCP: None  Consulting MD:  Treatment Team: Attending Provider: Hedy Alonzo MD; Care Manager: Kaveh Houston RN; Utilization Review: Olinda Aquino RN; Consulting Provider: Ron Holt MD; Consulting Provider: Chas Frey MD; Physical Therapist: Jered Sharma    CHIEF COMPLAINT: chills, body aches    subj     a 26 y.o. female with a past medical history of IV drug use who presents to the ER with report of 3 days of feeling chills and body aches all over. She denies any shana fevers, nausea, vomiting, dysuria, cough, congestion. Today, no fever, no ac events, no significant resp spx/ pain, no CP    Objective:     Visit Vitals  BP (!) 88/60   Pulse (!) 106   Temp 98.3 °F (36.8 °C)   Resp 19   Ht 5' 3\" (1.6 m)   Wt 65 kg (143 lb 6.4 oz)   SpO2 97%   Breastfeeding? Yes   BMI 25.40 kg/m²      Temp (24hrs), Av °F (37.2 °C), Min:98.3 °F (36.8 °C), Max:99.4 °F (37.4 °C)    Oxygen Therapy  O2 Sat (%): 97 % (19 1514)  Pulse via Oximetry: 93 beats per minute (19 1639)  O2 Device: Room air (19 0740)  O2 Flow Rate (L/min): 2 l/min (05/15/19 1207)  Physical Exam:  General:    AAO3  Lungs:   Clear to auscultation bilaterally   Heart:   Regular rate and rhythm, without murmurs, rubs, or gallops. Abdomen:   Soft, non-tender, non-distended with normoactive bowel sounds. Genitourinary: No tenderness over the bladder or bilateral CVAs. Extremities: Without clubbing, cyanosis, or edema. Skin:     Normal color, texture, and turgor. No rashes, lesions, or jaundice.   Neurologic: grossly intact    Data Review:   Recent Results (from the past 24 hour(s))   CBC WITH AUTOMATED DIFF    Collection Time: 19  9:30 AM   Result Value Ref Range    WBC 6.2 4.3 - 11.1 K/uL    RBC 3.37 (L) 4.05 - 5.2 M/uL    HGB 9.2 (L) 11.7 - 15.4 g/dL    HCT 28.7 (L) 35.8 - 46.3 %    MCV 85.2 79.6 - 97.8 FL    MCH 27.3 26.1 - 32.9 PG    MCHC 32.1 31.4 - 35.0 g/dL    RDW 15.0 (H) 11.9 - 14.6 %    PLATELET 513 (H) 537 - 450 K/uL    MPV 8.7 (L) 9.4 - 12.3 FL    ABSOLUTE NRBC 0.00 0.0 - 0.2 K/uL    DF AUTOMATED      NEUTROPHILS 72 43 - 78 %    LYMPHOCYTES 20 13 - 44 %    MONOCYTES 7 4.0 - 12.0 %    EOSINOPHILS 1 0.5 - 7.8 %    BASOPHILS 1 0.0 - 2.0 %    IMMATURE GRANULOCYTES 0 0.0 - 5.0 %    ABS. NEUTROPHILS 4.5 1.7 - 8.2 K/UL    ABS. LYMPHOCYTES 1.2 0.5 - 4.6 K/UL    ABS. MONOCYTES 0.4 0.1 - 1.3 K/UL    ABS. EOSINOPHILS 0.0 0.0 - 0.8 K/UL    ABS. BASOPHILS 0.0 0.0 - 0.2 K/UL    ABS. IMM. GRANS. 0.0 0.0 - 0.5 K/UL   CREATININE    Collection Time: 05/22/19  9:30 AM   Result Value Ref Range    Creatinine 0.54 (L) 0.6 - 1.0 MG/DL   HEPATIC FUNCTION PANEL    Collection Time: 05/22/19  9:30 AM   Result Value Ref Range    Protein, total 8.4 (H) 6.3 - 8.2 g/dL    Albumin 2.2 (L) 3.5 - 5.0 g/dL    Globulin 6.2 (H) 2.3 - 3.5 g/dL    A-G Ratio 0.4 (L) 1.2 - 3.5      Bilirubin, total 0.4 0.2 - 1.1 MG/DL    Bilirubin, direct <0.1 <0.4 MG/DL    Alk. phosphatase 149 (H) 50 - 136 U/L    AST (SGOT) 25 15 - 37 U/L    ALT (SGPT) 43 12 - 65 U/L   C REACTIVE PROTEIN, QT    Collection Time: 05/22/19  9:30 AM   Result Value Ref Range    C-Reactive protein 8.8 (H) 0.0 - 0.9 mg/dL       Imaging /Procedures /Studies:  No results found.        Assessment and Plan:     1- Bacteremia, MSSA w/ L-T septic arthritis/ OM dt IVD use  2- Ac UTI, urine E coli    Rx:  On Ancef per ID till 6-24  Follow blood CS final results, repeat 2nd Cs positive, 3rd still negative  Ultram  Cough control as iordered  PT consulted  K replacement as needed    Dispo: need long term hospitalization for IV abx    Signed By: Papo Valera MD     May 22, 2019

## 2019-05-22 NOTE — PROGRESS NOTES
Infectious Disease Progress Note    Today's Date: 2019   Admit Date: 2019    Impression:   · MSSA bacteremia, R-clindamycin, (, ), repeat culture  pending; TTE, TC negative  · Back pain: MRI with concern for septic facet arthritis T12-L5  · Pulmonary cavitary lesions - likely septic emboli - largest 3cm  · Hep C ab positive (2019); RNA PCR (-)  · Volume overload  · Diarrhea - likely withdrawal vs nafcillin  · IVDA    Plan:   · Continue Ancef  X 6 weeks- eot 19   · Continue methadone 10mg bid  · Not OPAT candidate due to IVDA  · Check routine labs today    Options for Recovery and HCV treatment would be  [facility] [phone] - suboxone + HCV rx  [facility]- HCV  [facility] for recovery    Anti-infectives:   · Nafcillin ( - . Ancef  - current  · Vancomycin (, )  · Ceftriaxone ( - 5/10, -)  · Zosyn ()    Subjective:   Feels terrible, still c/o hip/back pain. No worse or better. No N/V/D/F/C. No Known Allergies     Review of Systems:  A comprehensive review of systems was negative except for that written in the History of Present Illness. Objective:     Visit Vitals  /61   Pulse (!) 106   Temp 99 °F (37.2 °C)   Resp 17   Ht 5' 3\" (1.6 m)   Wt 65 kg (143 lb 6.4 oz)   SpO2 96%   Breastfeeding?  Yes   BMI 25.40 kg/m²     Temp (24hrs), Av.7 °F (37.1 °C), Min:97.5 °F (36.4 °C), Max:99.4 °F (37.4 °C)    Lines:  RUE PICC       Physical Exam:    General:  Cooperative, appears ill and uncomfortable, appears stated age   Eyes:  Sclera anicteric, not injected and no drainage   Mouth/Throat: Mucosa moist, op clear, multiple broken and missing teeth       Lungs:   Posterior lungs without audible wheezes, room air and no increased work of breathing   CV:  Regular rate and rhythm, no audible murmur   Abdomen:   Soft, non tender, non distended, active bowel sounds   Extremities: No cyanosis or edema   Skin: Multiple tattoos and piercings, no rash           Psych: Alert and oriented, appropriate        Data Review:     CBC:  No results for input(s): WBC, GRANS, MONOS, EOS, ANEU, ABL, HGB, HCT, PLT, HGBEXT, HCTEXT, PLTEXT, HGBEXT, HCTEXT, PLTEXT in the last 72 hours. No lab exists for component: LYMPHS,  MAILE    BMP:  No results for input(s): CREA, BUN, NA, K, CL, CO2, AGAP, GLU in the last 72 hours. LFTS:  No results for input(s): TBILI, ALT, SGOT, AP, TP, ALB in the last 72 hours. Microbiology:     All Micro Results     Procedure Component Value Units Date/Time    CULTURE, BLOOD [[id number]] Collected:  05/14/19 0752    Order Status:  Completed Specimen:  Blood Updated:  05/19/19 0641     Special Requests: LEFT HAND        Culture result: NO GROWTH 5 DAYS       CULTURE, BLOOD [[id number]]  (Abnormal) Collected:  05/11/19 0854    Order Status:  Completed Specimen:  Blood Updated:  05/14/19 0719     Special Requests: --        RIGHT  ARM       GRAM STAIN       GRAM POS COCCI IN CLUSTERS            PEDIATRIC BOTTLE               CRITICAL RESULT NOT CALLED DUE TO PREVIOUS NOTIFICATION OF CRITICAL RESULT WITHIN THE LAST 24 HOURS.            Culture result: STAPHYLOCOCCUS AUREUS         FOR SUSCEPTIBILITY REFER TO [id number]    CULTURE, BLOOD [[id number]]  (Abnormal) Collected:  05/11/19 0854    Order Status:  Completed Specimen:  Blood Updated:  05/14/19 0718     Special Requests: --        LEFT  HAND       GRAM STAIN GRAM POSITIVE COCCI         PEDIATRIC BOTTLE               RESULTS VERIFIED, PHONED TO AND READ BACK BY Mich Cardona @[facility] 05/12/2019 [facility]           Culture result: STAPHYLOCOCCUS AUREUS         FOR SUSCEPTIBILTIY REFER TO [address] NO [id number]    CULTURE, BLOOD [[id number]]  (Abnormal) Collected:  05/09/19 1822    Order Status:  Completed Specimen:  Whole Blood Updated:  05/14/19 0717     Special Requests: --        RIGHT  FOREARM       GRAM STAIN       GRAM POS COCCI IN CLUSTERS                  AEROBIC AND ANAEROBIC BOTTLES                  CRITICAL RESULT NOT CALLED DUE TO PREVIOUS NOTIFICATION OF CRITICAL RESULT WITHIN THE LAST 24 HOURS. Culture result: STAPHYLOCOCCUS AUREUS         FOR SUSCEPTIBILITY REFER TO [facility] NO [id number]    CULTURE, BLOOD [[id number]]  (Abnormal)  (Susceptibility) Collected:  05/09/19 1903    Order Status:  Completed Specimen:  Whole Blood Updated:  05/14/19 0713     Special Requests: --        RIGHT  Antecubital       GRAM STAIN       GRAM POS COCCI IN CLUSTERS                  AEROBIC AND ANAEROBIC BOTTLES                  RESULTS VERIFIED, PHONED TO AND READ BACK BY Guillermo Cintron RN @ 1617 ON 5/10/19 BY ADS           Culture result: STAPHYLOCOCCUS AUREUS               MRSA target DNA sequences not detected: SA target DNA sequence detected within the sample. Test performed by PCR  Culture/Sensitivity to follow          CULTURE, URINE [[id number]]  (Abnormal)  (Susceptibility) Collected:  05/09/19 2027    Order Status:  Completed Specimen:  Urine from Clean catch Updated:  05/13/19 0733     Special Requests: NO SPECIAL REQUESTS        Culture result:       >100,000 COLONIES/mL ESCHERICHIA COLI                  10,000 to 50,000 COLONIES/mL STAPHYLOCOCCUS AUREUS                Imaging:   Reviewed:     MRI 5/14/19  C-spine IMPRESSION:  1. Mild spinal stenosis at C2-C3 and C3-C4. 2. Mild to moderate spinal stenosis at C4-C5. T-spine IMPRESSION:     1. Bilateral pleural effusions with cavitary lung lesions. Correlate with  pulmonary imaging.     2. No significant thoracic spinal stenosis or findings of discitis.     2. Edema-like signal around the right-sided articular facet at T12-L1. Facet arthritis including septic arthritis could account for this finding. See MRI lumbar spine for further description. L-spine MPRESSION:  1. Abnormal enhancement posterior to the articular facet joints at the T12-L1,  L3-L4 and L4-L5 levels. Findings are concerning for septic facet arthritis. See  above description.  Similar findings are present around the right SI joint which  is likely infected as well.     2. Minimal enhancement along the inferior endplate of L1. This may be  degenerative in etiology. Consider short follow-up MR imaging of the lumbar  spine in several weeks for further assessment. CXR 5/13/19  IMPRESSION: New faint infiltrate right base, possibly lobar pneumonia. Left lung  infiltrate is cleared.     Signed By: Cortez Humphrey NP     May 22, 2019

## 2019-05-22 NOTE — PROGRESS NOTES
PT Note:    Attempted PT treatment this AM. Pt refused to participate again. Pt d/c from therapy services at this time per 3rd refusal. Notified pt of d/c from therapy services and encouraged to talk to physician if PT services are needed in future.     Thanks,  [employer] SPT

## 2019-05-22 NOTE — PROGRESS NOTES
Patient eating dinner, intake fair, complains that the food does not suit her. Refuses alternatives. Requesting cough medication, not due at this time. Will monitor.

## 2019-05-22 NOTE — PROGRESS NOTES
MD, Snow Baker, notified of increased HR. MD stated to notify of HR greater than 130. Will continue to monitor.

## 2019-05-22 NOTE — PROGRESS NOTES
Patient c/o cough. Patient received Robitussin at 2137. Patient c/o 10/10 back pain. Patient received Tramadol 50 mg tab at 2138. Will continue to monitor.

## 2019-05-22 NOTE — PROGRESS NOTES
Nutrition Follow Up  Assessment:   Diet: DIET REGULAR  DIET NUTRITIONAL SUPPLEMENTS Dinner; [employer]    Food/Nutrition Patient History:  The patient was sleeping during RD rounds today and did not wake up to knock on door or respond to name. She eats well and will remain inpatient due to need for IV antibiotics and IV drug abuse. Anthropometrics:Height: 5' 3\" (160 cm),  Weight: 65 kg (143 lb 6.4 oz), Weight Source: Bed  Macronutrient needs:  EER:  4884-3361 kcal /day (20-25 kcal/kg listed BW of 78.5 kg)  EPR:  63-79 grams protein/day (0.8-1 grams/kg listed BW)  Intake/Comparative Standards: Average intake for past 7 days/15 recorded meal(s): 94%. This potentially meets ~100% of kcal and ~100% of protein needs. Nutrition Diagnosis: No acute nutrition related diagnosis at this time. Intervention:  Meals and snacks: Continue current diet. Nutrition Supplement Therapy: continue ensure enlive once daily. Nutrition Discharge Plan: too soon to determine    [address]  Keke Barragan [address], [address] [place] [address],  [phone]

## 2019-05-22 NOTE — PROGRESS NOTES
Patient c/o cough. Patient received Robitussin at [address] [place] [address]. Will continue to monitor.

## 2019-05-23 PROCEDURE — 74011250637 HC RX REV CODE- 250/637: Performed by: FAMILY MEDICINE

## 2019-05-23 PROCEDURE — 74011250637 HC RX REV CODE- 250/637: Performed by: HOSPITALIST

## 2019-05-23 PROCEDURE — 74011250636 HC RX REV CODE- 250/636: Performed by: INTERNAL MEDICINE

## 2019-05-23 PROCEDURE — 65270000029 HC RM PRIVATE

## 2019-05-23 PROCEDURE — 74011250637 HC RX REV CODE- 250/637: Performed by: INTERNAL MEDICINE

## 2019-05-23 RX ADMIN — RIVAROXABAN 15 MG: 15 TABLET, FILM COATED ORAL at 17:24

## 2019-05-23 RX ADMIN — TRAMADOL HYDROCHLORIDE 50 MG: 50 TABLET, FILM COATED ORAL at 04:29

## 2019-05-23 RX ADMIN — Medication 10 ML: at 22:02

## 2019-05-23 RX ADMIN — GUAIFENESIN AND CODEINE PHOSPHATE 10 ML: 10; 100 LIQUID ORAL at 04:31

## 2019-05-23 RX ADMIN — TRAMADOL HYDROCHLORIDE 50 MG: 50 TABLET, FILM COATED ORAL at 15:11

## 2019-05-23 RX ADMIN — GUAIFENESIN AND CODEINE PHOSPHATE 10 ML: 10; 100 LIQUID ORAL at 09:07

## 2019-05-23 RX ADMIN — ACETAMINOPHEN 650 MG: 325 TABLET, FILM COATED ORAL at 17:24

## 2019-05-23 RX ADMIN — Medication 1 AMPULE: at 22:02

## 2019-05-23 RX ADMIN — Medication 2 G: at 05:18

## 2019-05-23 RX ADMIN — Medication 2 G: at 15:13

## 2019-05-23 RX ADMIN — RIVAROXABAN 15 MG: 15 TABLET, FILM COATED ORAL at 09:02

## 2019-05-23 RX ADMIN — Medication 5 ML: at 15:13

## 2019-05-23 RX ADMIN — METHADONE HYDROCHLORIDE 10 MG: 10 TABLET ORAL at 17:24

## 2019-05-23 RX ADMIN — Medication 10 ML: at 05:19

## 2019-05-23 RX ADMIN — Medication 1 AMPULE: at 09:02

## 2019-05-23 RX ADMIN — Medication 2 G: at 22:01

## 2019-05-23 RX ADMIN — GUAIFENESIN AND CODEINE PHOSPHATE 10 ML: 10; 100 LIQUID ORAL at 15:11

## 2019-05-23 RX ADMIN — TRAMADOL HYDROCHLORIDE 50 MG: 50 TABLET, FILM COATED ORAL at 21:59

## 2019-05-23 RX ADMIN — GUAIFENESIN AND CODEINE PHOSPHATE 10 ML: 10; 100 LIQUID ORAL at 22:00

## 2019-05-23 RX ADMIN — METHADONE HYDROCHLORIDE 10 MG: 10 TABLET ORAL at 09:02

## 2019-05-23 NOTE — PROGRESS NOTES
requested pain pill and cough medicine. Robitussin ac 10ccgiven po for c/o cough, ultram 1 given po for chronic back pain.  [id number] pulse 125, will monitor and recheck

## 2019-05-23 NOTE — PROGRESS NOTES
Sleeping intervals, awakens slowly, oriented x4. lung sounds clear,remains on room air. Abdomen soft, bs present appetite fair/good. hw right upper arm intact,dry dressing secured with tape, pt didn't want her iv  Messed up by dressing being changed. Otherwise pleasant this am.  Voiding w/o any difficulty. Ambulating to br on own. Aware to call for any needs.

## 2019-05-23 NOTE — PROGRESS NOTES
Pt requested cough medicine, no cough noted lungs clear, robitussin ac 10cc given as ordered.   Aware to call for asst

## 2019-05-23 NOTE — PROGRESS NOTES
HOSPITALIST    NAME:  Nadeen Banuelos   Age:  26 y.o.  :   1985   MRN:   [id number]  PCP: None  Consulting MD:  Treatment Team: Attending Provider: Brandt Marquez MD; Care Manager: Delmi Herring, RN; Utilization Review: Radu Richards RN; Consulting Provider: Arya Cohen MD; Consulting Provider: Regulo Cannon MD    CHIEF COMPLAINT: chills, body aches    subj     a 26 y.o. female with a past medical history of IV drug use who presents to the ER with report of 3 days of feeling chills and body aches all over. She denies any shana fevers, nausea, vomiting, dysuria, cough, congestion. Today, no fever, no ac events, no significant resp spx/ pain, no CP, BP low normal, feels and looks better    Objective:     Visit Vitals  BP 94/59   Pulse 87   Temp 99.6 °F (37.6 °C)   Resp 18   Ht 5' 3\" (1.6 m)   Wt 64.2 kg (141 lb 9.6 oz)   SpO2 91%   Breastfeeding? Yes   BMI 25.08 kg/m²      Temp (24hrs), Av.8 °F (37.1 °C), Min:98.3 °F (36.8 °C), Max:99.6 °F (37.6 °C)    Oxygen Therapy  O2 Sat (%): 91 % (19 1105)  Pulse via Oximetry: 93 beats per minute (19 1639)  O2 Device: Room air (19 0740)  O2 Flow Rate (L/min): 2 l/min (05/15/19 1207)  Physical Exam:  General:    AAO3  Lungs:   Clear to auscultation bilaterally   Heart:   Regular rate and rhythm, without murmurs, rubs, or gallops. Abdomen:   Soft, non-tender, non-distended with normoactive bowel sounds. Genitourinary: No tenderness over the bladder or bilateral CVAs. Extremities: Without clubbing, cyanosis, or edema. Skin:     Normal color, texture, and turgor. No rashes, lesions, or jaundice. Neurologic: grossly intact    Data Review:   No results found for this or any previous visit (from the past 24 hour(s)). Imaging /Procedures /Studies:  No results found.        Assessment and Plan:     1- Bacteremia, MSSA w/ L-T septic arthritis/ OM dt IVD use  2- Ac UTI, urine E coli    Rx:  On Ancef per ID till 6-24  Follow blood CS final results, repeat 2nd Cs positive, 3rd still negative  Ultram  Cough control as iordered  PT consulted  K replacement as needed    Dispo: need long term hospitalization for IV abx    Signed By: Chasity Pham MD     May 23, 2019

## 2019-05-24 PROCEDURE — 74011250636 HC RX REV CODE- 250/636: Performed by: INTERNAL MEDICINE

## 2019-05-24 PROCEDURE — 87040 BLOOD CULTURE FOR BACTERIA: CPT

## 2019-05-24 PROCEDURE — 74011250637 HC RX REV CODE- 250/637: Performed by: FAMILY MEDICINE

## 2019-05-24 PROCEDURE — 36415 COLL VENOUS BLD VENIPUNCTURE: CPT

## 2019-05-24 PROCEDURE — 74011250637 HC RX REV CODE- 250/637: Performed by: HOSPITALIST

## 2019-05-24 PROCEDURE — 74011250637 HC RX REV CODE- 250/637: Performed by: INTERNAL MEDICINE

## 2019-05-24 PROCEDURE — 65270000029 HC RM PRIVATE

## 2019-05-24 RX ORDER — FACIAL-BODY WIPES
10 EACH TOPICAL DAILY PRN
Status: DISCONTINUED | OUTPATIENT
Start: 2019-05-24 | End: 2019-06-02 | Stop reason: HOSPADM

## 2019-05-24 RX ORDER — SENNOSIDES 8.6 MG/1
2 TABLET ORAL 2 TIMES DAILY
Status: DISPENSED | OUTPATIENT
Start: 2019-05-24 | End: 2019-05-26

## 2019-05-24 RX ADMIN — Medication 5 ML: at 00:29

## 2019-05-24 RX ADMIN — Medication 2 G: at 13:09

## 2019-05-24 RX ADMIN — GUAIFENESIN AND CODEINE PHOSPHATE 10 ML: 10; 100 LIQUID ORAL at 17:18

## 2019-05-24 RX ADMIN — SENNOSIDES 17.2 MG: 8.6 TABLET, FILM COATED ORAL at 13:08

## 2019-05-24 RX ADMIN — Medication 1 AMPULE: at 09:05

## 2019-05-24 RX ADMIN — Medication 5 ML: at 13:09

## 2019-05-24 RX ADMIN — RIVAROXABAN 15 MG: 15 TABLET, FILM COATED ORAL at 09:03

## 2019-05-24 RX ADMIN — METHADONE HYDROCHLORIDE 10 MG: 10 TABLET ORAL at 17:18

## 2019-05-24 RX ADMIN — GUAIFENESIN AND CODEINE PHOSPHATE 10 ML: 10; 100 LIQUID ORAL at 09:04

## 2019-05-24 RX ADMIN — Medication 2 G: at 05:13

## 2019-05-24 RX ADMIN — Medication 2 G: at 22:41

## 2019-05-24 RX ADMIN — TRAMADOL HYDROCHLORIDE 50 MG: 50 TABLET, FILM COATED ORAL at 05:19

## 2019-05-24 RX ADMIN — RIVAROXABAN 15 MG: 15 TABLET, FILM COATED ORAL at 17:19

## 2019-05-24 RX ADMIN — TRAMADOL HYDROCHLORIDE 50 MG: 50 TABLET, FILM COATED ORAL at 22:44

## 2019-05-24 RX ADMIN — ACETAMINOPHEN 650 MG: 325 TABLET, FILM COATED ORAL at 17:22

## 2019-05-24 RX ADMIN — GUAIFENESIN AND CODEINE PHOSPHATE 10 ML: 10; 100 LIQUID ORAL at 22:42

## 2019-05-24 RX ADMIN — Medication 5 ML: at 05:13

## 2019-05-24 RX ADMIN — Medication 1 AMPULE: at 22:39

## 2019-05-24 RX ADMIN — GUAIFENESIN AND CODEINE PHOSPHATE 10 ML: 10; 100 LIQUID ORAL at 13:08

## 2019-05-24 RX ADMIN — METHADONE HYDROCHLORIDE 10 MG: 10 TABLET ORAL at 09:04

## 2019-05-24 RX ADMIN — TRAMADOL HYDROCHLORIDE 50 MG: 50 TABLET, FILM COATED ORAL at 13:08

## 2019-05-24 RX ADMIN — Medication 10 ML: at 22:43

## 2019-05-24 NOTE — PROGRESS NOTES
HOSPITALIST    NAME:  Breana Bright   Age:  26 y.o.  :   1985   MRN:   [id number]  PCP: None  Consulting MD:  Treatment Team: Attending Provider: Annalise King MD; Care Manager: Sharon Davidson RN; Utilization Review: Estephania Orozco RN; Consulting Provider: Naya Rangel MD; Consulting Provider: Dixie Mooney MD    CHIEF COMPLAINT: chills, body aches    subj     a 26 y.o. female with a past medical history of IV drug use who presents to the ER with report of 3 days of feeling chills and body aches all over. She denies any shana fevers, nausea, vomiting, dysuria, cough, congestion. Today, no fever, no ac events, no significant resp spx/ pain, no CP, BP low normal, feels and looks better. No BM for 6-7 days    Objective:     Visit Vitals  BP 91/65 (BP 1 Location: Left arm, BP Patient Position: At rest)   Pulse (!) 113   Temp 99.5 °F (37.5 °C)   Resp 20   Ht 5' 3\" (1.6 m)   Wt 64 kg (141 lb 3.2 oz)   SpO2 97%   Breastfeeding? Yes   BMI 25.01 kg/m²      Temp (24hrs), Av.4 °F (37.4 °C), Min:98 °F (36.7 °C), Max:101.3 °F (38.5 °C)    Oxygen Therapy  O2 Sat (%): 97 % (19 1125)  Pulse via Oximetry: 93 beats per minute (19 1639)  O2 Device: Room air (19 0750)  O2 Flow Rate (L/min): 2 l/min (05/15/19 1207)  Physical Exam:  General:    AAO3  Lungs:   Clear to auscultation bilaterally   Heart:   Regular rate and rhythm, without murmurs, rubs, or gallops. Abdomen:   Soft, non-tender, non-distended with normoactive bowel sounds. Genitourinary: No tenderness over the bladder or bilateral CVAs. Extremities: Without clubbing, cyanosis, or edema. Skin:     Normal color, texture, and turgor. No rashes, lesions, or jaundice. Neurologic: grossly intact    Data Review:   No results found for this or any previous visit (from the past 24 hour(s)). Imaging /Procedures /Studies:  No results found.        Assessment and Plan:     1- Bacteremia, MSSA w/ L-T septic arthritis/ OM dt IVD use  2- Ac UTI, urine E coli    Rx:  On Ancef per ID till 6-24  Follow blood CS final results, repeat 2nd Cs positive, 3rd still negative  Ultram  Meth   Laxatives   Cough control as iordered  PT consulted  K replacement as needed    Dispo: need long term hospitalization for IV abx    Signed By: Jos Lopez MD     May 24, 2019

## 2019-05-24 NOTE — PROGRESS NOTES
Infectious Disease Progress Note    Today's Date: 2019   Admit Date: 2019    Impression:   · MSSA bacteremia, R-clindamycin, (, ), repeat culture  pending; TTE, TC negative  · Back pain: MRI with concern for septic facet arthritis T12-L5  · Pulmonary cavitary lesions - likely septic emboli - largest 3cm  · Hep C ab positive (2019); RNA PCR (-)  · Volume overload  · Diarrhea - likely withdrawal vs nafcillin  · IVDA    Plan:   · Continue Ancef  X 6 weeks- eot 19   · Continue methadone 10mg bid  · Not OPAT candidate due to IVDA  · Still c/o bilateral shoulder pain and lumbar pain; if no better next week consider addition imaging  · Check repeat blood cultures today for fever 101.3 yesterday afternoon  · ID will follow-up Monday unless called    Options for Recovery and HCV treatment would be  [facility] recovery clinic [facility]  - suboxone + HCV rx  [facility]- HCV  [facility] for recovery    Anti-infectives:   · Nafcillin ( - . Ancef  - current  · Vancomycin (, )  · Ceftriaxone ( - 5/10, -)  · Zosyn ()    Subjective:   Fever 101.3 yesterday. Still c/o hip/back pain. No worse or better. Also with bilateral shoulder pain. No N/V/D/C. No Known Allergies     Review of Systems:  A comprehensive review of systems was negative except for that written in the History of Present Illness. Objective:     Visit Vitals  BP 98/58   Pulse (!) 116   Temp 98 °F (36.7 °C)   Resp 20   Ht 5' 3\" (1.6 m)   Wt 64.2 kg (141 lb 9.6 oz)   SpO2 97%   Breastfeeding?  Yes   BMI 25.08 kg/m²     Temp (24hrs), Av.4 °F (37.4 °C), Min:98 °F (36.7 °C), Max:101.3 °F (38.5 °C)    Lines:  RUE PICC       Physical Exam:    General:  Cooperative, appears ill and uncomfortable, appears stated age   Eyes:  Sclera anicteric, not injected and no drainage   Mouth/Throat: Mucosa moist, op clear, multiple broken and missing teeth       Lungs:   Posterior lungs without audible wheezes, room air and no increased work of breathing   CV:  Regular rate and rhythm, no audible murmur   Abdomen:   Soft, non tender, non distended, active bowel sounds   Extremities: No cyanosis or edema   Skin: Multiple tattoos and piercings, no rash           Psych: Alert and oriented, appropriate        Data Review:     CBC:  Recent Labs     05/22/19 0930   WBC 6.2   GRANS 72   MONOS 7   EOS 1   ANEU 4.5   ABL 1.2   HGB 9.2*   HCT 28.7*   *       BMP:  Recent Labs     05/22/19 0930   CREA 0.54*       LFTS:  Recent Labs     05/22/19 0930   TBILI 0.4   ALT 43   SGOT 25   *   TP 8.4*   ALB 2.2*       Microbiology:     All Micro Results     Procedure Component Value Units Date/Time    CULTURE, BLOOD [[id number]] Collected:  05/14/19 0752    Order Status:  Completed Specimen:  Blood Updated:  05/19/19 0641     Special Requests: LEFT HAND        Culture result: NO GROWTH 5 DAYS       CULTURE, BLOOD [[id number]]  (Abnormal) Collected:  05/11/19 0854    Order Status:  Completed Specimen:  Blood Updated:  05/14/19 0719     Special Requests: --        RIGHT  ARM       GRAM STAIN       GRAM POS COCCI IN CLUSTERS            PEDIATRIC BOTTLE               CRITICAL RESULT NOT CALLED DUE TO PREVIOUS NOTIFICATION OF CRITICAL RESULT WITHIN THE LAST 24 HOURS.            Culture result: STAPHYLOCOCCUS AUREUS         FOR SUSCEPTIBILITY REFER TO [id number]    CULTURE, BLOOD [[id number]]  (Abnormal) Collected:  05/11/19 0854    Order Status:  Completed Specimen:  Blood Updated:  05/14/19 0718     Special Requests: --        LEFT  HAND       GRAM STAIN GRAM POSITIVE COCCI         PEDIATRIC BOTTLE               RESULTS VERIFIED, PHONED TO AND READ BACK BY Libby Vargas @9448 05/12/2019 [facility]           Culture result: STAPHYLOCOCCUS AUREUS         FOR SUSCEPTIBILTIY REFER TO [address] NO [id number]    CULTURE, BLOOD [[id number]]  (Abnormal) Collected:  05/09/19 1822    Order Status:  Completed Specimen:  Whole Blood Updated:  05/14/19 0717     Special Requests: -- RIGHT  FOREARM       GRAM STAIN       GRAM POS COCCI IN CLUSTERS                  AEROBIC AND ANAEROBIC BOTTLES                  CRITICAL RESULT NOT CALLED DUE TO PREVIOUS NOTIFICATION OF CRITICAL RESULT WITHIN THE LAST 24 HOURS. Culture result: STAPHYLOCOCCUS AUREUS         FOR SUSCEPTIBILITY REFER TO [facility] NO [id number]    CULTURE, BLOOD [[id number]]  (Abnormal)  (Susceptibility) Collected:  05/09/19 1903    Order Status:  Completed Specimen:  Whole Blood Updated:  05/14/19 0713     Special Requests: --        RIGHT  Antecubital       GRAM STAIN       GRAM POS COCCI IN CLUSTERS                  AEROBIC AND ANAEROBIC BOTTLES                  RESULTS VERIFIED, PHONED TO AND READ BACK BY Bill Urbina, RN @ 8820 ON 5/10/19 BY [facility]           Culture result: STAPHYLOCOCCUS AUREUS               MRSA target DNA sequences not detected: SA target DNA sequence detected within the sample. Test performed by PCR  Culture/Sensitivity to follow          CULTURE, URINE [[id number]]  (Abnormal)  (Susceptibility) Collected:  05/09/19 2027    Order Status:  Completed Specimen:  Urine from Clean catch Updated:  05/13/19 0733     Special Requests: NO SPECIAL REQUESTS        Culture result:       >100,000 COLONIES/mL ESCHERICHIA COLI                  10,000 to 50,000 COLONIES/mL STAPHYLOCOCCUS AUREUS                Imaging:   Reviewed:     MRI 5/14/19  C-spine IMPRESSION:  1. Mild spinal stenosis at C2-C3 and C3-C4. 2. Mild to moderate spinal stenosis at C4-C5. T-spine IMPRESSION:     1. Bilateral pleural effusions with cavitary lung lesions. Correlate with  pulmonary imaging.     2. No significant thoracic spinal stenosis or findings of discitis.     2. Edema-like signal around the right-sided articular facet at T12-L1. Facet arthritis including septic arthritis could account for this finding. See MRI lumbar spine for further description. L-spine MPRESSION:  1.  Abnormal enhancement posterior to the articular facet joints at the T12-L1,  L3-L4 and L4-L5 levels. Findings are concerning for septic facet arthritis. See  above description. Similar findings are present around the right SI joint which  is likely infected as well.     2. Minimal enhancement along the inferior endplate of L1. This may be  degenerative in etiology. Consider short follow-up MR imaging of the lumbar  spine in several weeks for further assessment. CXR 5/13/19  IMPRESSION: New faint infiltrate right base, possibly lobar pneumonia. Left lung  infiltrate is cleared.     Signed By: Tricia Pena NP     May 24, 2019

## 2019-05-24 NOTE — INTERDISCIPLINARY ROUNDS
Interdisciplinary team rounds were held 5/24/2019 with the following team members:Care Management, Physical Therapy, Physician and Clinical Coordinator and the patient. Plan of care discussed. See clinical pathway and/or care plan for interventions and desired outcomes.

## 2019-05-25 PROCEDURE — 65270000029 HC RM PRIVATE

## 2019-05-25 PROCEDURE — 74011250637 HC RX REV CODE- 250/637: Performed by: INTERNAL MEDICINE

## 2019-05-25 PROCEDURE — 76937 US GUIDE VASCULAR ACCESS: CPT

## 2019-05-25 PROCEDURE — 74011250637 HC RX REV CODE- 250/637: Performed by: HOSPITALIST

## 2019-05-25 PROCEDURE — 74011250637 HC RX REV CODE- 250/637: Performed by: FAMILY MEDICINE

## 2019-05-25 PROCEDURE — 74011250636 HC RX REV CODE- 250/636: Performed by: INTERNAL MEDICINE

## 2019-05-25 RX ADMIN — GUAIFENESIN AND CODEINE PHOSPHATE 10 ML: 10; 100 LIQUID ORAL at 17:21

## 2019-05-25 RX ADMIN — RIVAROXABAN 15 MG: 15 TABLET, FILM COATED ORAL at 08:44

## 2019-05-25 RX ADMIN — GUAIFENESIN AND CODEINE PHOSPHATE 10 ML: 10; 100 LIQUID ORAL at 08:44

## 2019-05-25 RX ADMIN — TRAMADOL HYDROCHLORIDE 50 MG: 50 TABLET, FILM COATED ORAL at 03:24

## 2019-05-25 RX ADMIN — METHADONE HYDROCHLORIDE 10 MG: 10 TABLET ORAL at 17:20

## 2019-05-25 RX ADMIN — TRAMADOL HYDROCHLORIDE 50 MG: 50 TABLET, FILM COATED ORAL at 20:57

## 2019-05-25 RX ADMIN — ACETAMINOPHEN 650 MG: 325 TABLET, FILM COATED ORAL at 17:34

## 2019-05-25 RX ADMIN — GUAIFENESIN AND CODEINE PHOSPHATE 10 ML: 10; 100 LIQUID ORAL at 03:35

## 2019-05-25 RX ADMIN — Medication 1 AMPULE: at 08:45

## 2019-05-25 RX ADMIN — GUAIFENESIN AND CODEINE PHOSPHATE 10 ML: 10; 100 LIQUID ORAL at 20:58

## 2019-05-25 RX ADMIN — Medication 2 G: at 20:36

## 2019-05-25 RX ADMIN — RIVAROXABAN 15 MG: 15 TABLET, FILM COATED ORAL at 17:21

## 2019-05-25 RX ADMIN — Medication 2 G: at 13:34

## 2019-05-25 RX ADMIN — Medication 10 ML: at 21:10

## 2019-05-25 RX ADMIN — METHADONE HYDROCHLORIDE 10 MG: 10 TABLET ORAL at 08:44

## 2019-05-25 RX ADMIN — Medication 1 AMPULE: at 20:35

## 2019-05-25 RX ADMIN — GUAIFENESIN AND CODEINE PHOSPHATE 10 ML: 10; 100 LIQUID ORAL at 12:06

## 2019-05-25 RX ADMIN — Medication 5 ML: at 13:38

## 2019-05-25 RX ADMIN — Medication 10 ML: at 06:00

## 2019-05-25 RX ADMIN — TRAMADOL HYDROCHLORIDE 50 MG: 50 TABLET, FILM COATED ORAL at 12:05

## 2019-05-25 NOTE — PROGRESS NOTES
Patient refused Ancef  Stated that it hurts to bad because IV needs to be changed  Will let oncoming RN know to contact PICC team due to patient being a hard stick

## 2019-05-25 NOTE — PROGRESS NOTES
Patient is A/Ox4 resting in bed watching tv  Respirations are even unlabored on RA with no signs of distress  BSR given to oncoming RN

## 2019-05-25 NOTE — PROGRESS NOTES
BSR received patient is A/Ox4 resting in bed  Respirations are even unlabored on   RA with no signs of distress

## 2019-05-25 NOTE — PROGRESS NOTES
HOSPITALIST    NAME:  Edy Valle   Age:  26 y.o.  :   1985   MRN:   [id number]  PCP: None      subj     a 26 y.o. female with a past medical history of IV drug use who presents to the ER with report of 3 days of feeling chills and body aches all over. She denies any shana fevers, nausea, vomiting, dysuria, cough, congestion. Today, no fever, refused last dose of ancef, no BM, unrestrained behavior verbally    Objective:     Visit Vitals  BP 90/60 (BP 1 Location: Left arm, BP Patient Position: At rest)   Pulse (!) 106   Temp 99.9 °F (37.7 °C)   Resp 20   Ht 5' 3\" (1.6 m)   Wt 66.5 kg (146 lb 8 oz)   SpO2 96%   Breastfeeding? Yes   BMI 25.95 kg/m²      Temp (24hrs), Av.7 °F (37.6 °C), Min:99 °F (37.2 °C), Max:100.4 °F (38 °C)    Oxygen Therapy  O2 Sat (%): 96 % (19 1106)  Pulse via Oximetry: 93 beats per minute (19 1639)  O2 Device: Room air (19 0750)  O2 Flow Rate (L/min): 2 l/min (05/15/19 1207)  Physical Exam:  General:    AAO3  Lungs:   Clear to auscultation bilaterally   Heart:   Regular rate and rhythm, without murmurs, rubs, or gallops. Abdomen:   Soft, non-tender, non-distended with normoactive bowel sounds. Genitourinary: No tenderness over the bladder or bilateral CVAs. Extremities: Without clubbing, cyanosis, or edema. Skin:     Normal color, texture, and turgor. No rashes, lesions, or jaundice. Neurologic: grossly intact    Data Review:   No results found for this or any previous visit (from the past 24 hour(s)). Imaging /Procedures /Studies:  No results found.        Assessment and Plan:     1- Bacteremia, MSSA w/ L-T septic arthritis/ OM dt IVD use  2- Ac UTI, urine E coli    Rx:  On Ancef per ID till 6-24  Follow blood CS final results, repeat 2nd Cs positive, 3rd still negative  Ultram  Meth   Laxatives   Cough control as iordered  PT consulted  K replacement as needed    Dispo: need long term hospitalization for IV abx    Will see pt about every other day.    Signed By: Orlin Dhillon MD     May 25, 2019

## 2019-05-26 PROCEDURE — 74011250637 HC RX REV CODE- 250/637: Performed by: INTERNAL MEDICINE

## 2019-05-26 PROCEDURE — 65270000029 HC RM PRIVATE

## 2019-05-26 PROCEDURE — 74011250636 HC RX REV CODE- 250/636: Performed by: INTERNAL MEDICINE

## 2019-05-26 PROCEDURE — 74011250637 HC RX REV CODE- 250/637: Performed by: HOSPITALIST

## 2019-05-26 PROCEDURE — 74011250637 HC RX REV CODE- 250/637: Performed by: FAMILY MEDICINE

## 2019-05-26 RX ADMIN — Medication 1 AMPULE: at 08:58

## 2019-05-26 RX ADMIN — Medication 5 ML: at 14:00

## 2019-05-26 RX ADMIN — METHADONE HYDROCHLORIDE 10 MG: 10 TABLET ORAL at 08:58

## 2019-05-26 RX ADMIN — GUAIFENESIN AND CODEINE PHOSPHATE 10 ML: 10; 100 LIQUID ORAL at 06:37

## 2019-05-26 RX ADMIN — RIVAROXABAN 15 MG: 15 TABLET, FILM COATED ORAL at 08:58

## 2019-05-26 RX ADMIN — Medication 2 G: at 22:02

## 2019-05-26 RX ADMIN — Medication 10 ML: at 23:58

## 2019-05-26 RX ADMIN — METHADONE HYDROCHLORIDE 10 MG: 10 TABLET ORAL at 16:35

## 2019-05-26 RX ADMIN — Medication 2 G: at 14:00

## 2019-05-26 RX ADMIN — Medication 1 AMPULE: at 22:03

## 2019-05-26 RX ADMIN — Medication 10 ML: at 06:47

## 2019-05-26 RX ADMIN — Medication 2 G: at 06:00

## 2019-05-26 RX ADMIN — TRAMADOL HYDROCHLORIDE 50 MG: 50 TABLET, FILM COATED ORAL at 06:37

## 2019-05-26 RX ADMIN — RIVAROXABAN 15 MG: 15 TABLET, FILM COATED ORAL at 16:35

## 2019-05-26 RX ADMIN — TRAMADOL HYDROCHLORIDE 50 MG: 50 TABLET, FILM COATED ORAL at 22:03

## 2019-05-26 NOTE — PROGRESS NOTES
Patient voices no concerns at this time. Patient is resting in bed watching TV. Patient requested cough medication, but medication has been d/c. Call light within reach and patient instructed to call if assistance is needed. Will continue to monitor.

## 2019-05-26 NOTE — PROGRESS NOTES
BSR given to oncoming RN  Patient is sleeping in bed  Respirations are even unlabored on RA  There are no signs of distress at this time

## 2019-05-26 NOTE — PROGRESS NOTES
Patient observed walking hallway with her shorts, jacket, and sunglasses on. When patient was asked where she was going, patient stated \"just walking around\". Patient was then asked not to leave floor. Patient verbalized understanding. Patient then slipped on elevator when no one was looking.  noticed patient and got on elevator with patient. When this RN found patient downstairs patient refused to come back up. Patient was explained hospital policy. Patient stated \"That's F###ing stupid\". Patient explained that she was able to leave if she wished but patient stated \"I'm just F###ing tired of that room\". Patient once again explained that she could walk hallway but not leave floor. Security notified to help. Upon arrival of security patient did come back to room. Patient explained again that she was no allowed to leave floor. Patient stated \"OK, I got it now leave\". This RN left room. Patient is now sitting in recliner watching TV. Patient refuses to eat dinner.   Report to be given to oncoming RN 7p-7a

## 2019-05-26 NOTE — PROGRESS NOTES
BSR received  Patient is A/Ox4 resting in bed watching tv respirations are even unlabored on RA with no signs of distress  Will continue to monitor

## 2019-05-27 LAB
ALBUMIN SERPL-MCNC: 2.1 G/DL (ref 3.5–5)
ALBUMIN/GLOB SERPL: 0.4 {RATIO} (ref 1.2–3.5)
ALP SERPL-CCNC: 112 U/L (ref 50–136)
ALT SERPL-CCNC: 20 U/L (ref 12–65)
AST SERPL-CCNC: 16 U/L (ref 15–37)
BASOPHILS # BLD: 0 K/UL (ref 0–0.2)
BASOPHILS NFR BLD: 1 % (ref 0–2)
BILIRUB DIRECT SERPL-MCNC: <0.1 MG/DL
BILIRUB SERPL-MCNC: 0.3 MG/DL (ref 0.2–1.1)
CREAT SERPL-MCNC: 0.58 MG/DL (ref 0.6–1)
CRP SERPL-MCNC: 8.6 MG/DL (ref 0–0.9)
DIFFERENTIAL METHOD BLD: ABNORMAL
EOSINOPHIL # BLD: 0.2 K/UL (ref 0–0.8)
EOSINOPHIL NFR BLD: 4 % (ref 0.5–7.8)
ERYTHROCYTE [DISTWIDTH] IN BLOOD BY AUTOMATED COUNT: 14.4 % (ref 11.9–14.6)
ERYTHROCYTE [SEDIMENTATION RATE] IN BLOOD: >120 MM/HR (ref 0–20)
GLOBULIN SER CALC-MCNC: 5.9 G/DL (ref 2.3–3.5)
HCT VFR BLD AUTO: 26 % (ref 35.8–46.3)
HGB BLD-MCNC: 8.3 G/DL (ref 11.7–15.4)
IMM GRANULOCYTES # BLD AUTO: 0 K/UL (ref 0–0.5)
IMM GRANULOCYTES NFR BLD AUTO: 0 % (ref 0–5)
LYMPHOCYTES # BLD: 1.7 K/UL (ref 0.5–4.6)
LYMPHOCYTES NFR BLD: 42 % (ref 13–44)
MCH RBC QN AUTO: 27.4 PG (ref 26.1–32.9)
MCHC RBC AUTO-ENTMCNC: 31.9 G/DL (ref 31.4–35)
MCV RBC AUTO: 85.8 FL (ref 79.6–97.8)
MONOCYTES # BLD: 0.4 K/UL (ref 0.1–1.3)
MONOCYTES NFR BLD: 9 % (ref 4–12)
NEUTS SEG # BLD: 1.8 K/UL (ref 1.7–8.2)
NEUTS SEG NFR BLD: 45 % (ref 43–78)
NRBC # BLD: 0 K/UL (ref 0–0.2)
PLATELET # BLD AUTO: 414 K/UL (ref 150–450)
PMV BLD AUTO: 8.8 FL (ref 9.4–12.3)
PROT SERPL-MCNC: 8 G/DL (ref 6.3–8.2)
RBC # BLD AUTO: 3.03 M/UL (ref 4.05–5.2)
WBC # BLD AUTO: 4.1 K/UL (ref 4.3–11.1)

## 2019-05-27 PROCEDURE — 86140 C-REACTIVE PROTEIN: CPT

## 2019-05-27 PROCEDURE — 85025 COMPLETE CBC W/AUTO DIFF WBC: CPT

## 2019-05-27 PROCEDURE — 65270000029 HC RM PRIVATE

## 2019-05-27 PROCEDURE — 74011250636 HC RX REV CODE- 250/636: Performed by: INTERNAL MEDICINE

## 2019-05-27 PROCEDURE — 74011250637 HC RX REV CODE- 250/637: Performed by: INTERNAL MEDICINE

## 2019-05-27 PROCEDURE — 80076 HEPATIC FUNCTION PANEL: CPT

## 2019-05-27 PROCEDURE — 36415 COLL VENOUS BLD VENIPUNCTURE: CPT

## 2019-05-27 PROCEDURE — 74011250637 HC RX REV CODE- 250/637: Performed by: HOSPITALIST

## 2019-05-27 PROCEDURE — 74011250637 HC RX REV CODE- 250/637: Performed by: FAMILY MEDICINE

## 2019-05-27 PROCEDURE — 85652 RBC SED RATE AUTOMATED: CPT

## 2019-05-27 PROCEDURE — 82565 ASSAY OF CREATININE: CPT

## 2019-05-27 RX ORDER — SENNOSIDES 8.6 MG/1
2 TABLET ORAL 2 TIMES DAILY
Status: COMPLETED | OUTPATIENT
Start: 2019-05-27 | End: 2019-05-28

## 2019-05-27 RX ORDER — SENNOSIDES 8.6 MG/1
2 TABLET ORAL
Status: ACTIVE | OUTPATIENT
Start: 2019-05-27 | End: 2019-05-29

## 2019-05-27 RX ADMIN — Medication 10 ML: at 14:27

## 2019-05-27 RX ADMIN — Medication 1 AMPULE: at 21:14

## 2019-05-27 RX ADMIN — Medication 10 ML: at 05:47

## 2019-05-27 RX ADMIN — Medication 10 ML: at 21:14

## 2019-05-27 RX ADMIN — TRAMADOL HYDROCHLORIDE 50 MG: 50 TABLET, FILM COATED ORAL at 14:26

## 2019-05-27 RX ADMIN — SENNOSIDES 17.2 MG: 8.6 TABLET, FILM COATED ORAL at 09:40

## 2019-05-27 RX ADMIN — RIVAROXABAN 15 MG: 15 TABLET, FILM COATED ORAL at 09:40

## 2019-05-27 RX ADMIN — SENNOSIDES 17.2 MG: 8.6 TABLET, FILM COATED ORAL at 17:03

## 2019-05-27 RX ADMIN — TRAMADOL HYDROCHLORIDE 50 MG: 50 TABLET, FILM COATED ORAL at 21:14

## 2019-05-27 RX ADMIN — METHADONE HYDROCHLORIDE 10 MG: 10 TABLET ORAL at 09:40

## 2019-05-27 RX ADMIN — Medication 2 G: at 21:14

## 2019-05-27 RX ADMIN — Medication 1 AMPULE: at 09:40

## 2019-05-27 RX ADMIN — Medication 2 G: at 14:26

## 2019-05-27 RX ADMIN — RIVAROXABAN 15 MG: 15 TABLET, FILM COATED ORAL at 17:04

## 2019-05-27 RX ADMIN — METHADONE HYDROCHLORIDE 10 MG: 10 TABLET ORAL at 17:03

## 2019-05-27 RX ADMIN — TRAMADOL HYDROCHLORIDE 50 MG: 50 TABLET, FILM COATED ORAL at 05:49

## 2019-05-27 RX ADMIN — Medication 2 G: at 05:47

## 2019-05-27 NOTE — PROGRESS NOTES
Patient is A/Ox4 resting in bed watching tv  Respirations are even unlabored on RA with no signs of distress  Will give BSR to oncoming RN

## 2019-05-27 NOTE — PROGRESS NOTES
IV antibiotic infusions continue. Patient will not discharge until Abx completed. Case Management will continue to follow.     Care Management Interventions  PCP Verified by CM: No  Transition of Care Consult (CM Consult): Discharge Planning  Discharge Durable Medical Equipment: No  Physical Therapy Consult: No  Occupational Therapy Consult: No  Speech Therapy Consult: No  Current Support Network: Own Home  Confirm Follow Up Transport: Self  Plan discussed with Pt/Family/Caregiver: Yes  Freedom of Choice Offered: Yes  Discharge Location  Discharge Placement: Home

## 2019-05-27 NOTE — PROGRESS NOTES
Beside shift report received from [facility]  Patient lying in bed  Respirations present  No signs of distress  No needs expressed at this time  Safety measures in place

## 2019-05-27 NOTE — PROGRESS NOTES
BSR received  Patient is A/Ox4 resting in bed watching tv  Respirations are even unlabored on RA with no signs of distress at this time

## 2019-05-27 NOTE — PROGRESS NOTES
HOSPITALIST    NAME:  Tonya Fall   Age:  26 y.o.  :   1985   MRN:   [id number]  PCP: None      subj     a 26 y.o. female with a past medical history of IV drug use who presents to the ER with report of 3 days of feeling chills and body aches all over. She denies any shana fevers, nausea, vomiting, dysuria, cough, congestion. Today, no fever, sometimes refused last dose of ancef, no BM, sometimes has unrestrained behavior verbally    Objective:     Visit Vitals  BP 98/68   Pulse 100   Temp 98.7 °F (37.1 °C)   Resp 17   Ht 5' 3\" (1.6 m)   Wt 66.5 kg (146 lb 8 oz)   SpO2 98%   Breastfeeding? Yes   BMI 25.95 kg/m²      Temp (24hrs), Av.1 °F (37.3 °C), Min:98.7 °F (37.1 °C), Max:99.6 °F (37.6 °C)    Oxygen Therapy  O2 Sat (%): 98 % (19 0734)  Pulse via Oximetry: 93 beats per minute (19 1639)  O2 Device: Room air (19 0200)  O2 Flow Rate (L/min): 2 l/min (05/15/19 1207)  Physical Exam:  General:    AAO3  Lungs:   Clear to auscultation bilaterally   Heart:   Regular rate and rhythm, without murmurs, rubs, or gallops. Abdomen:   Soft, non-tender, non-distended with normoactive bowel sounds. Genitourinary: No tenderness over the bladder or bilateral CVAs. Extremities: Without clubbing, cyanosis, or edema. Skin:     Normal color, texture, and turgor. No rashes, lesions, or jaundice.   Neurologic: grossly intact    Data Review:   Recent Results (from the past 24 hour(s))   CBC WITH AUTOMATED DIFF    Collection Time: 19  6:45 AM   Result Value Ref Range    WBC 4.1 (L) 4.3 - 11.1 K/uL    RBC 3.03 (L) 4.05 - 5.2 M/uL    HGB 8.3 (L) 11.7 - 15.4 g/dL    HCT 26.0 (L) 35.8 - 46.3 %    MCV 85.8 79.6 - 97.8 FL    MCH 27.4 26.1 - 32.9 PG    MCHC 31.9 31.4 - 35.0 g/dL    RDW 14.4 11.9 - 14.6 %    PLATELET 089 090 - 510 K/uL    MPV 8.8 (L) 9.4 - 12.3 FL    ABSOLUTE NRBC 0.00 0.0 - 0.2 K/uL    DF AUTOMATED      NEUTROPHILS 45 43 - 78 %    LYMPHOCYTES 42 13 - 44 %    MONOCYTES 9 4.0 - 12.0 % EOSINOPHILS 4 0.5 - 7.8 %    BASOPHILS 1 0.0 - 2.0 %    IMMATURE GRANULOCYTES 0 0.0 - 5.0 %    ABS. NEUTROPHILS 1.8 1.7 - 8.2 K/UL    ABS. LYMPHOCYTES 1.7 0.5 - 4.6 K/UL    ABS. MONOCYTES 0.4 0.1 - 1.3 K/UL    ABS. EOSINOPHILS 0.2 0.0 - 0.8 K/UL    ABS. BASOPHILS 0.0 0.0 - 0.2 K/UL    ABS. IMM. GRANS. 0.0 0.0 - 0.5 K/UL   C REACTIVE PROTEIN, QT    Collection Time: 05/27/19  6:45 AM   Result Value Ref Range    C-Reactive protein 8.6 (H) 0.0 - 0.9 mg/dL   CREATININE    Collection Time: 05/27/19  6:45 AM   Result Value Ref Range    Creatinine 0.58 (L) 0.6 - 1.0 MG/DL   HEPATIC FUNCTION PANEL    Collection Time: 05/27/19  6:45 AM   Result Value Ref Range    Protein, total 8.0 6.3 - 8.2 g/dL    Albumin 2.1 (L) 3.5 - 5.0 g/dL    Globulin 5.9 (H) 2.3 - 3.5 g/dL    A-G Ratio 0.4 (L) 1.2 - 3.5      Bilirubin, total 0.3 0.2 - 1.1 MG/DL    Bilirubin, direct <0.1 <0.4 MG/DL    Alk. phosphatase 112 50 - 136 U/L    AST (SGOT) 16 15 - 37 U/L    ALT (SGPT) 20 12 - 65 U/L       Imaging /Procedures /Studies:  No results found. Assessment and Plan:     1- Bacteremia, MSSA w/ L-T septic arthritis/ OM dt IVD use  2- Ac UTI, urine E coli    Rx:  On Ancef per ID till 6-24  Follow blood CS final results, repeat 2nd Cs positive, 3rd still negative  Ultram  Meth   Laxatives   Cough control as iordered  PT consulted  K replacement as needed    Dispo: need long term hospitalization for IV abx    Pt is seen about every other day.     Signed By: Alondra Gudino MD     May 27, 2019

## 2019-05-28 PROBLEM — J18.9 PNEUMONIA: Status: ACTIVE | Noted: 2019-05-28

## 2019-05-28 PROCEDURE — 74011250637 HC RX REV CODE- 250/637: Performed by: INTERNAL MEDICINE

## 2019-05-28 PROCEDURE — 65270000029 HC RM PRIVATE

## 2019-05-28 PROCEDURE — 74011250637 HC RX REV CODE- 250/637: Performed by: HOSPITALIST

## 2019-05-28 PROCEDURE — 74011250637 HC RX REV CODE- 250/637: Performed by: FAMILY MEDICINE

## 2019-05-28 PROCEDURE — 74011250636 HC RX REV CODE- 250/636: Performed by: INTERNAL MEDICINE

## 2019-05-28 RX ORDER — BENZONATATE 100 MG/1
100 CAPSULE ORAL
Status: DISCONTINUED | OUTPATIENT
Start: 2019-05-28 | End: 2019-06-02 | Stop reason: HOSPADM

## 2019-05-28 RX ORDER — GUAIFENESIN 600 MG/1
1200 TABLET, EXTENDED RELEASE ORAL EVERY 12 HOURS
Status: DISCONTINUED | OUTPATIENT
Start: 2019-05-28 | End: 2019-06-02 | Stop reason: HOSPADM

## 2019-05-28 RX ADMIN — GUAIFENESIN 1200 MG: 600 TABLET, EXTENDED RELEASE ORAL at 17:05

## 2019-05-28 RX ADMIN — RIVAROXABAN 15 MG: 15 TABLET, FILM COATED ORAL at 17:18

## 2019-05-28 RX ADMIN — GUAIFENESIN 1200 MG: 600 TABLET, EXTENDED RELEASE ORAL at 22:07

## 2019-05-28 RX ADMIN — Medication 2 G: at 22:08

## 2019-05-28 RX ADMIN — SENNOSIDES 17.2 MG: 8.6 TABLET, FILM COATED ORAL at 08:54

## 2019-05-28 RX ADMIN — METHADONE HYDROCHLORIDE 10 MG: 10 TABLET ORAL at 17:05

## 2019-05-28 RX ADMIN — Medication 10 ML: at 06:42

## 2019-05-28 RX ADMIN — Medication 5 ML: at 13:36

## 2019-05-28 RX ADMIN — Medication 1 AMPULE: at 22:08

## 2019-05-28 RX ADMIN — Medication 1 AMPULE: at 08:54

## 2019-05-28 RX ADMIN — METHADONE HYDROCHLORIDE 10 MG: 10 TABLET ORAL at 08:54

## 2019-05-28 RX ADMIN — ACETAMINOPHEN 650 MG: 325 TABLET, FILM COATED ORAL at 17:10

## 2019-05-28 RX ADMIN — Medication 10 ML: at 22:08

## 2019-05-28 RX ADMIN — Medication 2 G: at 13:33

## 2019-05-28 RX ADMIN — SENNOSIDES 17.2 MG: 8.6 TABLET, FILM COATED ORAL at 17:10

## 2019-05-28 RX ADMIN — TRAMADOL HYDROCHLORIDE 50 MG: 50 TABLET, FILM COATED ORAL at 23:09

## 2019-05-28 RX ADMIN — TRAMADOL HYDROCHLORIDE 50 MG: 50 TABLET, FILM COATED ORAL at 13:30

## 2019-05-28 RX ADMIN — Medication 2 G: at 06:42

## 2019-05-28 RX ADMIN — RIVAROXABAN 15 MG: 15 TABLET, FILM COATED ORAL at 08:54

## 2019-05-28 NOTE — PROGRESS NOTES
Pt resting quietly in bed on room air. Pt is alert and oriented with c/o chronic pain in back and arms. Will provided prn pain med when available. Pt instructed to call for assistance, if needed.  Call light in reach

## 2019-05-28 NOTE — PROGRESS NOTES
Tylenol 650 mg po given for pain. Respirations even and unlabored. No s/sx distress. No needs at present.

## 2019-05-28 NOTE — PROGRESS NOTES
Interdisciplinary team rounds were held 5/28/2019 with the following team members:Care Management, Nursing, Physical Therapy, Physician and Clinical Coordinator and the patient. Plan of care discussed. See clinical pathway and/or care plan for interventions and desired outcomes.

## 2019-05-28 NOTE — PROGRESS NOTES
Pt sitting up in chair on room air. Pt is alert and oriented with no complaints at this time. Pt instructed to call for assistance, call light in reach. VSS.

## 2019-05-28 NOTE — PROGRESS NOTES
Hospitalist Progress Note    2019  Admit Date: 2019  5:26 PM   NAME: Tonya Fall   :  1985   MRN:  [id number]   Attending: Eugenio Mariee MD  PCP:  None    SUBJECTIVE:   Patient is a 27 yo F admitted 19 with MSSA bacteremia due to IV drug abuse. Complaining of some shoulder pain and low back pain. Reports cough that is nonproductive (though she feels like she needs to cough some sputum up). Review of Systems negative with exception of pertinent positives noted above  PHYSICAL EXAM     Visit Vitals  BP 99/64   Pulse (!) 104   Temp 98.8 °F (37.1 °C)   Resp 18   Ht 5' 3\" (1.6 m)   Wt 67.2 kg (148 lb 3.2 oz)   SpO2 96%   Breastfeeding? Yes   BMI 26.25 kg/m²      Temp (24hrs), Av.4 °F (36.9 °C), Min:97.5 °F (36.4 °C), Max:98.9 °F (37.2 °C)    Patient Vitals for the past 24 hrs:   Temp Pulse Resp BP SpO2   19 1510 99.6 °F (37.6 °C) (!) 105 17 105/68 99 %   19 1118 98.8 °F (37.1 °C) (!) 104 18 99/64 96 %   19 0727 97.5 °F (36.4 °C) 96 20 94/58 98 %   19 0346 98.9 °F (37.2 °C) 84 18 96/60 95 %   19 2004 98.6 °F (37 °C) 92 18 99/64 96 %       Oxygen Therapy  O2 Sat (%): 96 % (19 1118)  Pulse via Oximetry: 93 beats per minute (19 1639)  O2 Device: Room air (19 0730)  O2 Flow Rate (L/min): 2 l/min (05/15/19 1207)    Intake/Output Summary (Last 24 hours) at 2019 1401  Last data filed at 2019 1342  Gross per 24 hour   Intake 800 ml   Output 3 ml   Net 797 ml      General: No acute distress    Lungs:  CTA Bilaterally. Heart:  Regular rate and rhythm,  No murmur, rub, or gallop  Abdomen: Soft, Non distended, Non tender, Positive bowel sounds  Extremities: No cyanosis, clubbing or edema  Neurologic:  No focal deficits    No results found for this or any previous visit (from the past 24 hour(s)). Imaging:    CT CHEST WO CONT   Final Result   IMPRESSION:     1.   Cavitary nodules/masses involving all lobes of the lungs with the largest nodule in the right lung apex measuring 3.3 cm x 2.7 cm. This appearance can be   seen as a result of septic emboli to the lungs, or could represent necrotic   pulmonary metastases. Recommend correlation for signs/symptoms of infection   which would favor septic and bladder. 2. Bilateral pleural effusions, right greater than left. The right pleural   effusion demonstrates loculated appearing components. Empyema is not excluded if   the patient has symptoms of pneumonia. 3. Multiple findings suggesting fluid overload. MRI LUMB SPINE W WO CONT   Preliminary Result   IMPRESSION:      1. Abnormal enhancement posterior to the articular facet joints at the T12-L1,   L3-L4 and L4-L5 levels. Findings are concerning for septic facet arthritis. See   above description. Similar findings are present around the right SI joint which   is likely infected as well. 2. Minimal enhancement along the inferior endplate of L1. This may be   degenerative in etiology. Consider short follow-up MR imaging of the lumbar   spine in several weeks for further assessment. MRI [facility] SPINE W CONT   Final Result   IMPRESSION:      1. Bilateral pleural effusions with cavitary lung lesions. Correlate with   pulmonary imaging. 2. No significant thoracic spinal stenosis or findings of discitis. 2. Edema-like signal around the right-sided articular facet at T12-L1. Facet   arthritis including septic arthritis could account for this finding. See MRI   lumbar spine for further description. MRI CERV SPINE W CONT   Final Result   IMPRESSION:   1. Mild spinal stenosis at C2-C3 and C3-C4. 2. Mild to moderate spinal stenosis at C4-C5. XR CHEST SNGL V   Final Result   IMPRESSION: New faint infiltrate right base, possibly lobar pneumonia. Left lung   infiltrate is cleared.                MRI LUMB SPINE W CONT    (Results Pending)   MRI SHOULDER LT W CONT    (Results Pending)         ASSESSMENT Hospital Problems as of 5/28/2019 Never Reviewed          Codes Class Noted - Resolved POA    * (Principal) Severe sepsis ([facility] [address] ICD-10-CM: A41.9, R65.20  ICD-9-CM: 038.9, 995.92  5/9/2019 - Present Yes        UTI (urinary tract infection) ICD-10-CM: N39.0  ICD-9-CM: 599.0  5/9/2019 - Present Yes        History of pulmonary embolism ICD-10-CM: Z66.012  ICD-9-CM: V12.55  5/9/2019 - Present Yes        Hypokalemia ICD-10-CM: E87.6  ICD-9-CM: 276.8  5/9/2019 - Present Yes        IVDU (intravenous drug user) ICD-10-CM: F19.90  ICD-9-CM: 305.90  2/1/2019 - Present Yes            Plan:  · MSSA bacteremia  · Ancef EOT 6/24/19    · Heroin abuse- methadone    · Pneumonia/cough  · Pneumonia likely septic emboli  · Start mucinex and prn tessalon    DVT Prophylaxis: Xarelto     Signed By: Harvey Klein MD     May 28, 2019

## 2019-05-28 NOTE — PROGRESS NOTES
Infectious Disease Progress Note    Today's Date: 2019   Admit Date: 2019    Impression:   · MSSA bacteremia, R-clindamycin, (, ), repeat culture  pending; TTE, TC negative  · Back pain: MRI with concern for septic facet arthritis T12-L5  · Pulmonary cavitary lesions - likely septic emboli - largest 3cm  · Hep C ab positive (2019); RNA PCR (-)- no need for treatment  · Diarrhea - likely withdrawal vs nafcillin - improved  · IVDA    Plan:   · Continue Ancef  X 6 weeks- eot 19   · Continue methadone 10mg bid  · Not OPAT candidate due to IVDA  · Left shoulder worse than R, will image with MRI  · Though neuro exam looks ok, history of having to lift up her legs to get to the bathroom concerning - will repeat MRI L -spine      Options for Recovery  [facility] clinic [phone] - suboxone   NHC- HCV  [facility] for recovery    Anti-infectives:   · Nafcillin ( - . Ancef  - current  · Vancomycin (, )  · Ceftriaxone ( - 5/10, -)  · Zosyn ()    Subjective:   Has to lift her legs up to get to bathroom, left shoulder hurting more than right, +sweats at night    No Known Allergies     Review of Systems:  A comprehensive review of systems was negative except for that written in the History of Present Illness. Objective:     Visit Vitals  /68   Pulse (!) 105   Temp 99.6 °F (37.6 °C)   Resp 17   Ht 5' 3\" (1.6 m)   Wt 67.2 kg (148 lb 3.2 oz)   SpO2 99%   Breastfeeding?  Yes   BMI 26.25 kg/m²     Temp (24hrs), Av.7 °F (37.1 °C), Min:97.5 °F (36.4 °C), Max:99.6 °F (37.6 °C)    Lines:  RUE PICC       Physical Exam:    General:  Cooperative, appears ill and uncomfortable, appears stated age   Eyes:  Sclera anicteric, not injected and no drainage   Mouth/Throat: Mucosa moist, op clear, multiple broken and missing teeth       Lungs:   Posterior lungs without audible wheezes, room air and no increased work of breathing   CV:  Regular rate and rhythm, no audible murmur Abdomen:   Soft, non tender, non distended, active bowel sounds   Extremities: Motor 5/5 BLE, ROM of shoulders limited to about 60 deg L worse than right   Skin: Multiple tattoos and piercings, no rash           Psych: Alert and oriented, appropriate        Data Review:     CBC:  Recent Labs     05/27/19 0645   WBC 4.1*   GRANS 45   MONOS 9   EOS 4   ANEU 1.8   ABL 1.7   HGB 8.3*   HCT 26.0*          BMP:  Recent Labs     05/27/19 0645   CREA 0.58*       LFTS:  Recent Labs     05/27/19 0645   TBILI 0.3   ALT 20   SGOT 16      TP 8.0   ALB 2.1*       Microbiology:     All Micro Results     Procedure Component Value Units Date/Time    CULTURE, BLOOD [[id number]] Collected:  05/24/19 1046    Order Status:  Completed Specimen:  Blood Updated:  05/28/19 0702     Special Requests: --        LEFT  HAND       Culture result: NO GROWTH 4 DAYS       CULTURE, BLOOD [[id number]] Collected:  05/24/19 1049    Order Status:  Completed Specimen:  Blood Updated:  05/28/19 0702     Special Requests: --        RIGHT  ARM       Culture result: NO GROWTH 4 DAYS       CULTURE, BLOOD [[id number]] Collected:  05/14/19 0752    Order Status:  Completed Specimen:  Blood Updated:  05/19/19 0641     Special Requests: LEFT HAND        Culture result: NO GROWTH 5 DAYS       CULTURE, BLOOD [[id number]]  (Abnormal) Collected:  05/11/19 0854    Order Status:  Completed Specimen:  Blood Updated:  05/14/19 0719     Special Requests: --        RIGHT  ARM       GRAM STAIN       GRAM POS COCCI IN CLUSTERS            PEDIATRIC BOTTLE               CRITICAL RESULT NOT CALLED DUE TO PREVIOUS NOTIFICATION OF CRITICAL RESULT WITHIN THE LAST 24 HOURS.            Culture result: STAPHYLOCOCCUS AUREUS         FOR SUSCEPTIBILITY REFER TO [id number]    CULTURE, BLOOD [[id number]]  (Abnormal) Collected:  05/11/19 0854    Order Status:  Completed Specimen:  Blood Updated:  05/14/19 0718     Special Requests: --        LEFT  HAND       GRAM STAIN Martín Sampson POSITIVE COCCI         PEDIATRIC BOTTLE               RESULTS VERIFIED, PHONED TO AND READ BACK BY Kim Morel @0121 05/12/2019 JB           Culture result: STAPHYLOCOCCUS AUREUS         FOR SUSCEPTIBILTIY REFER TO [address] [place] [address] Drive NO [id number]    CULTURE, BLOOD [[id number]]  (Abnormal) Collected:  05/09/19 1822    Order Status:  Completed Specimen:  Whole Blood Updated:  05/14/19 0717     Special Requests: --        RIGHT  FOREARM       GRAM STAIN       GRAM POS COCCI IN CLUSTERS                  AEROBIC AND ANAEROBIC BOTTLES                  CRITICAL RESULT NOT CALLED DUE TO PREVIOUS NOTIFICATION OF CRITICAL RESULT WITHIN THE LAST 24 HOURS. Culture result: STAPHYLOCOCCUS AUREUS         FOR SUSCEPTIBILITY REFER TO ACC NO [id number]    CULTURE, BLOOD [[id number]]  (Abnormal)  (Susceptibility) Collected:  05/09/19 1903    Order Status:  Completed Specimen:  Whole Blood Updated:  05/14/19 0713     Special Requests: --        RIGHT  Antecubital       GRAM STAIN       GRAM POS COCCI IN CLUSTERS                  AEROBIC AND ANAEROBIC BOTTLES                  RESULTS VERIFIED, PHONED TO AND READ BACK BY Bandar Scott RN @ 8343 ON 5/10/19 BY ADS           Culture result: STAPHYLOCOCCUS AUREUS               MRSA target DNA sequences not detected: SA target DNA sequence detected within the sample. Test performed by PCR  Culture/Sensitivity to follow          CULTURE, URINE [[id number]]  (Abnormal)  (Susceptibility) Collected:  05/09/19 2027    Order Status:  Completed Specimen:  Urine from Clean catch Updated:  05/13/19 0728     Special Requests: NO SPECIAL REQUESTS        Culture result:       >100,000 COLONIES/mL ESCHERICHIA COLI                  10,000 to 50,000 COLONIES/mL STAPHYLOCOCCUS AUREUS                Imaging:   Reviewed:     MRI 5/14/19  C-spine IMPRESSION:  1. Mild spinal stenosis at C2-C3 and C3-C4. 2. Mild to moderate spinal stenosis at C4-C5.     T-spine IMPRESSION:     1. Bilateral pleural effusions with cavitary lung lesions. Correlate with  pulmonary imaging.     2. No significant thoracic spinal stenosis or findings of discitis.     2. Edema-like signal around the right-sided articular facet at T12-L1. Facet arthritis including septic arthritis could account for this finding. See MRI lumbar spine for further description. L-spine MPRESSION:  1. Abnormal enhancement posterior to the articular facet joints at the T12-L1,  L3-L4 and L4-L5 levels. Findings are concerning for septic facet arthritis. See  above description. Similar findings are present around the right SI joint which  is likely infected as well.     2. Minimal enhancement along the inferior endplate of L1. This may be  degenerative in etiology. Consider short follow-up MR imaging of the lumbar  spine in several weeks for further assessment. CXR 5/13/19  IMPRESSION: New faint infiltrate right base, possibly lobar pneumonia. Left lung  infiltrate is cleared.     Signed By: Marina Rodriguez MD     May 28, 2019

## 2019-05-29 ENCOUNTER — APPOINTMENT (OUTPATIENT)
Dept: MRI IMAGING | Age: 34
DRG: 871 | End: 2019-05-29
Attending: INTERNAL MEDICINE
Payer: SUBSIDIZED

## 2019-05-29 PROCEDURE — 72158 MRI LUMBAR SPINE W/O & W/DYE: CPT

## 2019-05-29 PROCEDURE — 74011250637 HC RX REV CODE- 250/637: Performed by: FAMILY MEDICINE

## 2019-05-29 PROCEDURE — 74011250637 HC RX REV CODE- 250/637: Performed by: INTERNAL MEDICINE

## 2019-05-29 PROCEDURE — A9575 INJ GADOTERATE MEGLUMI 0.1ML: HCPCS | Performed by: FAMILY MEDICINE

## 2019-05-29 PROCEDURE — 74011250637 HC RX REV CODE- 250/637: Performed by: HOSPITALIST

## 2019-05-29 PROCEDURE — 74011250636 HC RX REV CODE- 250/636: Performed by: INTERNAL MEDICINE

## 2019-05-29 PROCEDURE — 74011250636 HC RX REV CODE- 250/636: Performed by: FAMILY MEDICINE

## 2019-05-29 PROCEDURE — 65270000029 HC RM PRIVATE

## 2019-05-29 RX ORDER — SODIUM CHLORIDE 0.9 % (FLUSH) 0.9 %
10 SYRINGE (ML) INJECTION
Status: COMPLETED | OUTPATIENT
Start: 2019-05-29 | End: 2019-05-29

## 2019-05-29 RX ORDER — GADOTERATE MEGLUMINE 376.9 MG/ML
13 INJECTION INTRAVENOUS
Status: COMPLETED | OUTPATIENT
Start: 2019-05-29 | End: 2019-05-29

## 2019-05-29 RX ADMIN — Medication 10 ML: at 21:04

## 2019-05-29 RX ADMIN — Medication 2 G: at 05:27

## 2019-05-29 RX ADMIN — Medication 2 G: at 17:28

## 2019-05-29 RX ADMIN — RIVAROXABAN 15 MG: 15 TABLET, FILM COATED ORAL at 17:27

## 2019-05-29 RX ADMIN — METHADONE HYDROCHLORIDE 10 MG: 10 TABLET ORAL at 17:27

## 2019-05-29 RX ADMIN — TRAMADOL HYDROCHLORIDE 50 MG: 50 TABLET, FILM COATED ORAL at 21:01

## 2019-05-29 RX ADMIN — Medication 1 AMPULE: at 21:00

## 2019-05-29 RX ADMIN — GADOTERATE MEGLUMINE 13 ML: 376.9 INJECTION INTRAVENOUS at 19:34

## 2019-05-29 RX ADMIN — GUAIFENESIN 1200 MG: 600 TABLET, EXTENDED RELEASE ORAL at 21:00

## 2019-05-29 RX ADMIN — Medication 1 AMPULE: at 09:10

## 2019-05-29 RX ADMIN — Medication 10 ML: at 19:34

## 2019-05-29 RX ADMIN — METHADONE HYDROCHLORIDE 10 MG: 10 TABLET ORAL at 09:10

## 2019-05-29 RX ADMIN — Medication 5 ML: at 17:28

## 2019-05-29 RX ADMIN — TRAMADOL HYDROCHLORIDE 50 MG: 50 TABLET, FILM COATED ORAL at 05:35

## 2019-05-29 RX ADMIN — Medication 10 ML: at 05:26

## 2019-05-29 RX ADMIN — RIVAROXABAN 15 MG: 15 TABLET, FILM COATED ORAL at 09:10

## 2019-05-29 RX ADMIN — GUAIFENESIN 1200 MG: 600 TABLET, EXTENDED RELEASE ORAL at 09:10

## 2019-05-29 RX ADMIN — ACETAMINOPHEN 650 MG: 325 TABLET, FILM COATED ORAL at 23:19

## 2019-05-29 NOTE — PROGRESS NOTES
Pt lying in bed, watching tv.  Pt is alert and oriented with no complaints at this time and is instructed to call for assistance

## 2019-05-29 NOTE — PROGRESS NOTES
Received call from MRI, in re to pt being able to tolerate MRI w/o any sedation. Spoke with pt and she thinks she can tolerate w/o sedation. [facility] in MRI notified.

## 2019-05-29 NOTE — PROGRESS NOTES
To MRI via stretcher. No change in assessmentBedside shift change report given to [address] (oncoming nurse) by self (offgoing nurse). Report included the following information SBAR.

## 2019-05-29 NOTE — PROGRESS NOTES
Sitting on side of bed, alert, oriented. Conversation short,blunt but pleasant. Alert, oriented. Heart regular, lung sounds diminished, states sob on exertion. remain son room air. Abdomen soft, appetite good. No edema noted,skin intact. When asks if having any pain states always has pain. hw left arm intact, site healthy. Aware to call for any needs. States voiding w/o any difficulty. Isreal Whitfield

## 2019-05-29 NOTE — PROGRESS NOTES
Infectious Disease Progress Note    Today's Date: 2019   Admit Date: 2019    Impression:   · MSSA bacteremia (, ), repeat culture , negative; TTE, TC negative  · Back pain: MRI with concern for septic facet arthritis T12-L5  · Pulmonary cavitary lesions - likely septic emboli - largest 3cm  · Hep C ab positive (2019); RNA PCR (-)- no need for treatment  · Diarrhea - likely withdrawal vs nafcillin - improved  · IVDA    Plan:   · Continue Cefazolin 2g IV Q8hr, planned for 6 weeks- EOT 19   · Continue methadone 10mg bid  · Not OPAT candidate due to IVDA  · Follow MRI shoulder and L-spine  · Follow Hgb closely      Options for Recovery  [facility] recovery clinic [phone] - suboxone   [facility] for recovery    Anti-infectives:   · Nafcillin ( - . Ancef  - current  · Vancomycin (, )  · Ceftriaxone ( - 5/10, -)  · Zosyn ()    Subjective:   Reports bilateral arm pain and back pain, no fever, chills, sweats, nausea or diarrhea. Awaiting MRI. No Known Allergies     Review of Systems:  A comprehensive review of systems was negative except for that written in the History of Present Illness. Objective:     Visit Vitals  /77   Pulse 66   Temp 98.3 °F (36.8 °C)   Resp 18   Ht 5' 3\" (1.6 m)   Wt 67 kg (147 lb 9.6 oz)   SpO2 97%   Breastfeeding?  Yes   BMI 26.15 kg/m²     Temp (24hrs), Av.5 °F (36.9 °C), Min:98 °F (36.7 °C), Max:99.6 °F (37.6 °C)    Lines:  RUE PICC       Physical Exam:    General:  Cooperative, ill appearing but in NAD, appears stated age   Eyes:  Sclera anicteric, not injected and no drainage   Mouth/Throat: Mucosa moist, op clear, multiple broken and missing teeth   Lungs:   Clear to auscultation, room air   CV:  Regular rate and rhythm, no audible murmur   Abdomen:   Soft, non tender, non distended, active bowel sounds   Extremities: Limited ROM arms, L>R, LE weak   Skin: Multiple tattoos and piercings, no rash   Psych: Alert and oriented, appropriate        Data Review:     CBC:  Recent Labs     05/27/19 0645   WBC 4.1*   GRANS 45   MONOS 9   EOS 4   ANEU 1.8   ABL 1.7   HGB 8.3*   HCT 26.0*          BMP:  Recent Labs     05/27/19 0645   CREA 0.58*       LFTS:  Recent Labs     05/27/19 0645   TBILI 0.3   ALT 20   SGOT 16      TP 8.0   ALB 2.1*       Microbiology:     All Micro Results     Procedure Component Value Units Date/Time    CULTURE, BLOOD [[id number]] Collected:  05/24/19 1046    Order Status:  Completed Specimen:  Blood Updated:  05/29/19 0645     Special Requests: --        LEFT  HAND       Culture result: NO GROWTH 5 DAYS       CULTURE, BLOOD [[id number]] Collected:  05/24/19 1049    Order Status:  Completed Specimen:  Blood Updated:  05/29/19 0645     Special Requests: --        RIGHT  ARM       Culture result: NO GROWTH 5 DAYS       CULTURE, BLOOD [[id number]] Collected:  05/14/19 0752    Order Status:  Completed Specimen:  Blood Updated:  05/19/19 0641     Special Requests: LEFT HAND        Culture result: NO GROWTH 5 DAYS       CULTURE, BLOOD [[id number]]  (Abnormal) Collected:  05/11/19 0854    Order Status:  Completed Specimen:  Blood Updated:  05/14/19 0719     Special Requests: --        RIGHT  ARM       GRAM STAIN       GRAM POS COCCI IN CLUSTERS            PEDIATRIC BOTTLE               CRITICAL RESULT NOT CALLED DUE TO PREVIOUS NOTIFICATION OF CRITICAL RESULT WITHIN THE LAST 24 HOURS.            Culture result: STAPHYLOCOCCUS AUREUS         FOR SUSCEPTIBILITY REFER TO [id number]    CULTURE, BLOOD [[id number]]  (Abnormal) Collected:  05/11/19 0854    Order Status:  Completed Specimen:  Blood Updated:  05/14/19 0718     Special Requests: --        LEFT  HAND       GRAM STAIN GRAM POSITIVE COCCI         PEDIATRIC BOTTLE               RESULTS VERIFIED, PHONED TO AND READ BACK BY Richard Winters @5915 05/12/2019 [facility]           Culture result: STAPHYLOCOCCUS AUREUS         FOR SUSCEPTIBILTIY REFER TO [address] NO [id number]    CULTURE, BLOOD [[id number]]  (Abnormal) Collected:  05/09/19 1822    Order Status:  Completed Specimen:  Whole Blood Updated:  05/14/19 0717     Special Requests: --        RIGHT  FOREARM       GRAM STAIN       GRAM POS COCCI IN CLUSTERS                  AEROBIC AND ANAEROBIC BOTTLES                  CRITICAL RESULT NOT CALLED DUE TO PREVIOUS NOTIFICATION OF CRITICAL RESULT WITHIN THE LAST 24 HOURS. Culture result: STAPHYLOCOCCUS AUREUS         FOR SUSCEPTIBILITY REFER TO ACC NO [id number]    CULTURE, BLOOD [[id number]]  (Abnormal)  (Susceptibility) Collected:  05/09/19 1903    Order Status:  Completed Specimen:  Whole Blood Updated:  05/14/19 0713     Special Requests: --        RIGHT  Antecubital       GRAM STAIN       GRAM POS COCCI IN CLUSTERS                  AEROBIC AND ANAEROBIC BOTTLES                  RESULTS VERIFIED, PHONED TO AND READ BACK BY Guillermo Cintron RN @ 9323 ON 5/10/19 BY ADS           Culture result: STAPHYLOCOCCUS AUREUS               MRSA target DNA sequences not detected: SA target DNA sequence detected within the sample. Test performed by PCR  Culture/Sensitivity to follow          CULTURE, URINE [[id number]]  (Abnormal)  (Susceptibility) Collected:  05/09/19 2027    Order Status:  Completed Specimen:  Urine from Clean catch Updated:  05/13/19 0733     Special Requests: NO SPECIAL REQUESTS        Culture result:       >100,000 COLONIES/mL ESCHERICHIA COLI                  10,000 to 50,000 COLONIES/mL STAPHYLOCOCCUS AUREUS                Imaging:   Reviewed:     MRI 5/14/19  C-spine IMPRESSION:  1. Mild spinal stenosis at C2-C3 and C3-C4. 2. Mild to moderate spinal stenosis at C4-C5. T-spine IMPRESSION:     1. Bilateral pleural effusions with cavitary lung lesions. Correlate with  pulmonary imaging.     2. No significant thoracic spinal stenosis or findings of discitis.     2. Edema-like signal around the right-sided articular facet at T12-L1.  Facet arthritis including septic arthritis could account for this finding. See MRI lumbar spine for further description. L-spine MPRESSION:  1. Abnormal enhancement posterior to the articular facet joints at the T12-L1,  L3-L4 and L4-L5 levels. Findings are concerning for septic facet arthritis. See  above description. Similar findings are present around the right SI joint which  is likely infected as well.     2. Minimal enhancement along the inferior endplate of L1. This may be  degenerative in etiology. Consider short follow-up MR imaging of the lumbar  spine in several weeks for further assessment. CXR 5/13/19  IMPRESSION: New faint infiltrate right base, possibly lobar pneumonia. Left lung  infiltrate is cleared.     Signed By: Chery Anaya NP     May 29, 2019

## 2019-05-29 NOTE — PROGRESS NOTES
Nutrition Follow Up    Assessment:   DIET REGULAR  DIET NUTRITIONAL SUPPLEMENTS Dinner; [employer]    Food/Nutrition Patient History: The patient was sleeping during RD rounds today and did not wake up to knock on door or respond to name. Attempted 2 visits during the day. She eats well and will remain inpatient due to need for IV antibiotics and IV drug abuse. Anthropometrics:  Height: 5' 3\" (160 cm), Weight: 67 kg (147 lb 9.6 oz), Weight Source: Bed, Body mass index is 26.15 kg/m². Macronutrient needs:  EER:  4395-0627 kcal /day (20-25 kcal/kg listed BW of 78.5 kg)  EPR:  63-79 grams protein/day (0.8-1 grams/kg listed BW)    Intake/Comparative Standards: Patient consuming average 83% of 14 recorded meals in 5 days. This potentially meets 80% of kcal needs and 80% of protein needs.       Nutrition Intervention:  Meals and snacks: Continue with current diet  Nutrition Supplement Therapy: continue ensure enlive once daily  Discharge Nutrition Plan: Too soon to determine    Adam Malik, MS, RD, LD

## 2019-05-29 NOTE — PROGRESS NOTES
Attending MD and ID asked  about whether the patient could be allowed to leave \"on a pass\" to see her niece graduate. Explained that this would be a liability to the hospital and that the only way that we could arrange this would be to discharge the patient and then readmit when she returns, if she returns. Advised them to work with nursing to see if some arrangement can be made for patient to attend. Case Management will continue to follow.     Care Management Interventions  PCP Verified by CM: No  Transition of Care Consult (CM Consult): Discharge Planning  Discharge Durable Medical Equipment: No  Physical Therapy Consult: No  Occupational Therapy Consult: No  Speech Therapy Consult: No  Current Support Network: Own Home  Confirm Follow Up Transport: Self  Plan discussed with Pt/Family/Caregiver: Yes  Freedom of Choice Offered: Yes  Discharge Location  Discharge Placement: Home

## 2019-05-29 NOTE — PROGRESS NOTES
Hospitalist Progress Note    2019  Admit Date: 2019  5:26 PM   NAME: Francesco Sanabriakeeper   :  1985   MRN:  [id number]   Attending: Consuelo Garza MD  PCP:  None    SUBJECTIVE:   Patient is a 25 yo F admitted 19 with MSSA bacteremia due to IV drug abuse. Complaining of some shoulder pain and low back pain. Reports cough that is nonproductive (though she feels like she needs to cough some sputum up). - cough some improved. Still with L shoulder and low back pain. MRI pending. Review of Systems negative with exception of pertinent positives noted above  PHYSICAL EXAM     Visit Vitals  /70   Pulse 75   Temp 98.2 °F (36.8 °C)   Resp 18   Ht 5' 3\" (1.6 m)   Wt 67 kg (147 lb 9.6 oz)   SpO2 97%   Breastfeeding? Yes   BMI 26.15 kg/m²      Temp (24hrs), Av.1 °F (36.7 °C), Min:98 °F (36.7 °C), Max:98.3 °F (36.8 °C)    Patient Vitals for the past 24 hrs:   Temp Pulse Resp BP SpO2   19 1459 98.2 °F (36.8 °C) 75 18 112/70 97 %   19 1114 98.2 °F (36.8 °C) 78 19 108/72 98 %   19 0734 98.3 °F (36.8 °C) 66 18 103/77 97 %   19 0530    101/66    19 0334 98 °F (36.7 °C) 91 18 98/62 98 %   19 0035 98 °F (36.7 °C) 99 18 106/68 99 %   19 98.1 °F (36.7 °C) 93 17 102/72 98 %       Oxygen Therapy  O2 Sat (%): 97 % (19 1459)  Pulse via Oximetry: 93 beats per minute (19 1639)  O2 Device: Room air (19 0730)  O2 Flow Rate (L/min): 2 l/min (05/15/19 1207)    Intake/Output Summary (Last 24 hours) at 2019 1849  Last data filed at 2019 1739  Gross per 24 hour   Intake 960 ml   Output 950 ml   Net 10 ml      General: No acute distress    Lungs:  CTA Bilaterally.    Heart:  Regular rate and rhythm,  No murmur, rub, or gallop  Abdomen: Soft, Non distended, Non tender, Positive bowel sounds  Extremities: No cyanosis, clubbing or edema  Neurologic:  No focal deficits    No results found for this or any previous visit (from the past 24 hour(s)). Imaging:    CT CHEST WO CONT   Final Result   IMPRESSION:     1. Cavitary nodules/masses involving all lobes of the lungs with the largest   nodule in the right lung apex measuring 3.3 cm x 2.7 cm. This appearance can be   seen as a result of septic emboli to the lungs, or could represent necrotic   pulmonary metastases. Recommend correlation for signs/symptoms of infection   which would favor septic and bladder. 2. Bilateral pleural effusions, right greater than left. The right pleural   effusion demonstrates loculated appearing components. Empyema is not excluded if   the patient has symptoms of pneumonia. 3. Multiple findings suggesting fluid overload. MRI LUMB SPINE W WO CONT   Preliminary Result   IMPRESSION:      1. Abnormal enhancement posterior to the articular facet joints at the T12-L1,   L3-L4 and L4-L5 levels. Findings are concerning for septic facet arthritis. See   above description. Similar findings are present around the right SI joint which   is likely infected as well. 2. Minimal enhancement along the inferior endplate of L1. This may be   degenerative in etiology. Consider short follow-up MR imaging of the lumbar   spine in several weeks for further assessment. MRI [facility] SPINE W CONT   Final Result   IMPRESSION:      1. Bilateral pleural effusions with cavitary lung lesions. Correlate with   pulmonary imaging. 2. No significant thoracic spinal stenosis or findings of discitis. 2. Edema-like signal around the right-sided articular facet at T12-L1. Facet   arthritis including septic arthritis could account for this finding. See MRI   lumbar spine for further description. MRI CERV SPINE W CONT   Final Result   IMPRESSION:   1. Mild spinal stenosis at C2-C3 and C3-C4. 2. Mild to moderate spinal stenosis at C4-C5. XR CHEST SNGL V   Final Result   IMPRESSION: New faint infiltrate right base, possibly lobar pneumonia.  Left lung infiltrate is cleared. MRI SHOULDER LT W CONT    (Results Pending)   MRI LUMB SPINE W WO CONT    (Results Pending)         [address] Problems as of 5/29/2019 Never Reviewed          Codes Class Noted - Resolved POA    Pneumonia ICD-10-CM: J18.9  ICD-9-CM: 588  5/28/2019 - Present Yes        * (Principal) Severe sepsis ([address] ICD-10-CM: A41.9, R65.20  ICD-9-CM: 038.9, 995.92  5/9/2019 - Present Yes        UTI (urinary tract infection) ICD-10-CM: N39.0  ICD-9-CM: 599.0  5/9/2019 - Present Yes        History of pulmonary embolism ICD-10-CM: E92.982  ICD-9-CM: V12.55  5/9/2019 - Present Yes        Hypokalemia ICD-10-CM: E87.6  ICD-9-CM: 276.8  5/9/2019 - Present Yes        IVDU (intravenous drug user) ICD-10-CM: F19.90  ICD-9-CM: 305.90  2/1/2019 - Present Yes            Plan:  · MSSA bacteremia  · Ancef EOT 6/24/19    · Heroin abuse- methadone    · Pneumonia/cough  · Pneumonia likely septic emboli  · Continue mucinex and prn tessalon    DVT Prophylaxis: Xarelto     Signed By: Bridgette Flores MD     May 29, 2019

## 2019-05-30 PROCEDURE — 74011250637 HC RX REV CODE- 250/637: Performed by: FAMILY MEDICINE

## 2019-05-30 PROCEDURE — 74011250637 HC RX REV CODE- 250/637: Performed by: HOSPITALIST

## 2019-05-30 PROCEDURE — 74011250637 HC RX REV CODE- 250/637: Performed by: INTERNAL MEDICINE

## 2019-05-30 PROCEDURE — 65270000029 HC RM PRIVATE

## 2019-05-30 PROCEDURE — 74011250636 HC RX REV CODE- 250/636: Performed by: INTERNAL MEDICINE

## 2019-05-30 RX ADMIN — RIVAROXABAN 15 MG: 15 TABLET, FILM COATED ORAL at 17:52

## 2019-05-30 RX ADMIN — Medication 1 AMPULE: at 08:54

## 2019-05-30 RX ADMIN — Medication 2 G: at 17:52

## 2019-05-30 RX ADMIN — Medication 2 G: at 01:00

## 2019-05-30 RX ADMIN — Medication 10 ML: at 17:52

## 2019-05-30 RX ADMIN — Medication 2 G: at 08:54

## 2019-05-30 RX ADMIN — RIVAROXABAN 15 MG: 15 TABLET, FILM COATED ORAL at 08:54

## 2019-05-30 RX ADMIN — GUAIFENESIN 1200 MG: 600 TABLET, EXTENDED RELEASE ORAL at 21:58

## 2019-05-30 RX ADMIN — TRAMADOL HYDROCHLORIDE 50 MG: 50 TABLET, FILM COATED ORAL at 22:30

## 2019-05-30 RX ADMIN — Medication 10 ML: at 21:58

## 2019-05-30 RX ADMIN — Medication 10 ML: at 06:11

## 2019-05-30 RX ADMIN — TRAMADOL HYDROCHLORIDE 50 MG: 50 TABLET, FILM COATED ORAL at 13:18

## 2019-05-30 RX ADMIN — Medication 1 AMPULE: at 21:58

## 2019-05-30 RX ADMIN — GUAIFENESIN 1200 MG: 600 TABLET, EXTENDED RELEASE ORAL at 08:54

## 2019-05-30 RX ADMIN — METHADONE HYDROCHLORIDE 10 MG: 10 TABLET ORAL at 08:54

## 2019-05-30 RX ADMIN — ACETAMINOPHEN 650 MG: 325 TABLET, FILM COATED ORAL at 17:52

## 2019-05-30 RX ADMIN — METHADONE HYDROCHLORIDE 10 MG: 10 TABLET ORAL at 17:52

## 2019-05-30 NOTE — PROGRESS NOTES
Hospitalist Progress Note    2019  Admit Date: 2019  5:26 PM   NAME: Danika Mortensen   :  1985   MRN:  [id number]   Attending: Stefanie Cotton MD  PCP:  None    SUBJECTIVE:   Patient is a 27 yo F admitted 19 with MSSA bacteremia due to IV drug abuse. Complaining of some shoulder pain and low back pain. Reports cough that is nonproductive (though she feels like she needs to cough some sputum up). - cough some improved. Still with L shoulder and low back pain. MRI pending. -  Clinically the same. Back pain and shoulder pain stable. MRI lumbar spine improved. Review of Systems negative with exception of pertinent positives noted above  PHYSICAL EXAM     Visit Vitals  /71   Pulse 100   Temp 98.1 °F (36.7 °C)   Resp 19   Ht 5' 3\" (1.6 m)   Wt 68.2 kg (150 lb 6.4 oz)   SpO2 95%   Breastfeeding? Yes   BMI 26.64 kg/m²      Temp (24hrs), Av.1 °F (36.7 °C), Min:98 °F (36.7 °C), Max:98.2 °F (36.8 °C)    Patient Vitals for the past 24 hrs:   Temp Pulse Resp BP SpO2   19 1504 98.1 °F (36.7 °C) 100 19 103/71 95 %   19 1052 98 °F (36.7 °C) 76 18 109/74 96 %   19 0713 98.1 °F (36.7 °C) 88 18 106/74 96 %   19 0410 98.2 °F (36.8 °C) 98 18 98/66 97 %   19 2347 98 °F (36.7 °C) (!) 110 18 101/70 99 %   19 98 °F (36.7 °C) (!) 101 18 116/78 98 %       Oxygen Therapy  O2 Sat (%): 95 % (19 1504)  Pulse via Oximetry: 93 beats per minute (19 1639)  O2 Device: Room air (19 0737)  O2 Flow Rate (L/min): 2 l/min (05/15/19 1207)    Intake/Output Summary (Last 24 hours) at 2019 1740  Last data filed at 2019 1504  Gross per 24 hour   Intake 720 ml   Output 900 ml   Net -180 ml      General: No acute distress    Lungs:  CTA Bilaterally.    Heart:  Regular rate and rhythm,  No murmur, rub, or gallop  Abdomen: Soft, Non distended, Non tender, Positive bowel sounds  Extremities: No cyanosis, clubbing or edema  Neurologic:  No focal deficits    No results found for this or any previous visit (from the past 24 hour(s)). Imaging:    MRI LUMB SPINE W WO CONT   Final Result   IMPRESSION:   1.  T12-L1 and L3-4 right facet inflammatory changes still present, but   improved. 2.  Right SI joint inflammatory changes also appear improved. 3.  Stable mild edema and enhancement in the L1 vertebral body. This could also   represent an inflammatory process with mild osteomyelitis not excluded. CT CHEST WO CONT   Final Result   IMPRESSION:     1. Cavitary nodules/masses involving all lobes of the lungs with the largest   nodule in the right lung apex measuring 3.3 cm x 2.7 cm. This appearance can be   seen as a result of septic emboli to the lungs, or could represent necrotic   pulmonary metastases. Recommend correlation for signs/symptoms of infection   which would favor septic and bladder. 2. Bilateral pleural effusions, right greater than left. The right pleural   effusion demonstrates loculated appearing components. Empyema is not excluded if   the patient has symptoms of pneumonia. 3. Multiple findings suggesting fluid overload. MRI LUMB SPINE W WO CONT   Preliminary Result   IMPRESSION:      1. Abnormal enhancement posterior to the articular facet joints at the T12-L1,   L3-L4 and L4-L5 levels. Findings are concerning for septic facet arthritis. See   above description. Similar findings are present around the right SI joint which   is likely infected as well. 2. Minimal enhancement along the inferior endplate of L1. This may be   degenerative in etiology. Consider short follow-up MR imaging of the lumbar   spine in several weeks for further assessment. MRI [facility] SPINE W CONT   Final Result   IMPRESSION:      1. Bilateral pleural effusions with cavitary lung lesions. Correlate with   pulmonary imaging. 2. No significant thoracic spinal stenosis or findings of discitis.       2. Edema-like signal around the right-sided articular facet at T12-L1. Facet   arthritis including septic arthritis could account for this finding. See MRI   lumbar spine for further description. MRI CERV SPINE W CONT   Final Result   IMPRESSION:   1. Mild spinal stenosis at C2-C3 and C3-C4. 2. Mild to moderate spinal stenosis at C4-C5. XR CHEST SNGL V   Final Result   IMPRESSION: New faint infiltrate right base, possibly lobar pneumonia. Left lung   infiltrate is cleared. MRI SHOULDER LT W CONT    (Results Pending)         ASSESSMENT      Hospital Problems as of 5/30/2019 Never Reviewed          Codes Class Noted - Resolved POA    Pneumonia ICD-10-CM: J18.9  ICD-9-CM: 062  5/28/2019 - Present Yes        * (Principal) Severe sepsis ([address] ICD-10-CM: A41.9, R65.20  ICD-9-CM: 038.9, 995.92  5/9/2019 - Present Yes        UTI (urinary tract infection) ICD-10-CM: N39.0  ICD-9-CM: 599.0  5/9/2019 - Present Yes        History of pulmonary embolism ICD-10-CM: I13.408  ICD-9-CM: V12.55  5/9/2019 - Present Yes        Hypokalemia ICD-10-CM: E87.6  ICD-9-CM: 276.8  5/9/2019 - Present Yes        IVDU (intravenous drug user) ICD-10-CM: F19.90  ICD-9-CM: 305.90  2/1/2019 - Present Yes            Plan:  · MSSA bacteremia  · Ancef EOT 6/24/19    · Heroin abuse- methadone    · Pneumonia/cough  · Pneumonia likely septic emboli  · Continue mucinex and prn tessalon    DVT Prophylaxis: Xarelto     Signed By: Patricia Ceron MD     May 30, 2019

## 2019-05-30 NOTE — INTERDISCIPLINARY ROUNDS
Interdisciplinary team rounds were held 5/30/2019 with the following team members:Care Management, Physical Therapy, Physician and Clinical Coordinator and the patient. Plan of care discussed. See clinical pathway and/or care plan for interventions and desired outcomes.

## 2019-05-30 NOTE — PROGRESS NOTES
Patient rested well throughout the night with no complaints  Respirations are even unlabored on RA  There are no signs of distress at this time  Will give BSR to oncoming RN

## 2019-05-30 NOTE — PROGRESS NOTES
Patient is A/Ox4 resting in bed  Respirations are even unlabored on RA with no signs of distress at this time

## 2019-05-30 NOTE — PROGRESS NOTES
Pt resting in the bed watching tv. Pt is stable. Pt is axo. Pt c/o back and shoulder pain, 10/10. Scheduled pain medication given per order. Safety measures are in place. Will continue to monitor.

## 2019-05-31 ENCOUNTER — APPOINTMENT (OUTPATIENT)
Dept: MRI IMAGING | Age: 34
DRG: 871 | End: 2019-05-31
Attending: INTERNAL MEDICINE
Payer: SUBSIDIZED

## 2019-05-31 PROCEDURE — 76937 US GUIDE VASCULAR ACCESS: CPT

## 2019-05-31 PROCEDURE — 74011250637 HC RX REV CODE- 250/637: Performed by: FAMILY MEDICINE

## 2019-05-31 PROCEDURE — 74011250637 HC RX REV CODE- 250/637: Performed by: HOSPITALIST

## 2019-05-31 PROCEDURE — 74011250637 HC RX REV CODE- 250/637: Performed by: INTERNAL MEDICINE

## 2019-05-31 PROCEDURE — 73223 MRI JOINT UPR EXTR W/O&W/DYE: CPT

## 2019-05-31 PROCEDURE — 65270000029 HC RM PRIVATE

## 2019-05-31 PROCEDURE — 74011250636 HC RX REV CODE- 250/636: Performed by: INTERNAL MEDICINE

## 2019-05-31 PROCEDURE — A9575 INJ GADOTERATE MEGLUMI 0.1ML: HCPCS | Performed by: INTERNAL MEDICINE

## 2019-05-31 RX ORDER — GADOTERATE MEGLUMINE 376.9 MG/ML
14 INJECTION INTRAVENOUS
Status: COMPLETED | OUTPATIENT
Start: 2019-05-31 | End: 2019-05-31

## 2019-05-31 RX ORDER — SODIUM CHLORIDE 0.9 % (FLUSH) 0.9 %
10 SYRINGE (ML) INJECTION
Status: COMPLETED | OUTPATIENT
Start: 2019-05-31 | End: 2019-05-31

## 2019-05-31 RX ORDER — SODIUM CHLORIDE 0.9 % (FLUSH) 0.9 %
10 SYRINGE (ML) INJECTION
Status: ACTIVE | OUTPATIENT
Start: 2019-05-31 | End: 2019-06-01

## 2019-05-31 RX ADMIN — Medication 10 ML: at 15:18

## 2019-05-31 RX ADMIN — GADOTERATE MEGLUMINE 14 ML: 376.9 INJECTION INTRAVENOUS at 15:18

## 2019-05-31 RX ADMIN — Medication 2 G: at 00:00

## 2019-05-31 RX ADMIN — Medication 1 AMPULE: at 22:08

## 2019-05-31 RX ADMIN — Medication 10 ML: at 05:11

## 2019-05-31 RX ADMIN — TRAMADOL HYDROCHLORIDE 50 MG: 50 TABLET, FILM COATED ORAL at 22:09

## 2019-05-31 RX ADMIN — GUAIFENESIN 1200 MG: 600 TABLET, EXTENDED RELEASE ORAL at 22:08

## 2019-05-31 RX ADMIN — METHADONE HYDROCHLORIDE 10 MG: 10 TABLET ORAL at 17:37

## 2019-05-31 RX ADMIN — GUAIFENESIN 1200 MG: 600 TABLET, EXTENDED RELEASE ORAL at 09:24

## 2019-05-31 RX ADMIN — Medication 1 AMPULE: at 09:24

## 2019-05-31 RX ADMIN — Medication 2 G: at 17:37

## 2019-05-31 RX ADMIN — Medication 10 ML: at 17:37

## 2019-05-31 RX ADMIN — METHADONE HYDROCHLORIDE 10 MG: 10 TABLET ORAL at 09:24

## 2019-05-31 RX ADMIN — Medication 10 ML: at 22:08

## 2019-05-31 RX ADMIN — RIVAROXABAN 20 MG: 20 TABLET, FILM COATED ORAL at 17:37

## 2019-05-31 NOTE — PROGRESS NOTES
Notified of patients    Patient is resting in recliner with no signs of distress  Will continue to monitor

## 2019-05-31 NOTE — PROGRESS NOTES
PT note: New orders received. Patient was previously discharged due to lack of participation. She refused on first attempt and was off floor in MRI on second attempt. Will check back over the weekend, noted possible dc on Sunday.      Viviane Lee, DPT

## 2019-05-31 NOTE — PROGRESS NOTES
Patient slept well throughout the night  Respirations are even unlabored on RA with no signs of distress will give BSR to oncoming RN

## 2019-05-31 NOTE — PROGRESS NOTES
BSR received  Patient is resting in bed talking on phone  Respirations are even unlabored on RA with no signs of distress

## 2019-05-31 NOTE — PROGRESS NOTES
Infectious Disease Progress Note    Today's Date: 2019   Admit Date: 2019    Impression:   · MSSA bacteremia (, ), repeat culture , negative; TTE, TC negative  · Back pain: MRI with concern for septic facet arthritis T12-L5  · Pulmonary cavitary lesions - likely septic emboli - largest 3cm  · Hep C ab positive (2019); RNA PCR (-)- no need for treatment  · Diarrhea - likely withdrawal vs nafcillin - improved  · IVDA    Plan:   · Continue Cefazolin 2g IV Q8hr until . Full eot 19 to complete 6 week course, however pt wishes to attend her niece's graduation. Advise DC on  with PO keflex 500mg QID x 4 weeks and for patient to come back through ED for readmission after graduation. · Given MRI L-spine improved, weakness is all deconditioning. Advised she should walk the halls for strengthening. PT consult placed  · Cont methadone 10 BID - needs to get established with methadone clinic outpt - asked case management to assist in making initial appointment  · Would still get MRI shoulder given limited mobility in left shoulder    Options for Recovery  [facility] recovery clinic [phone] - suboxone   [facility] for recovery    Anti-infectives:   · Nafcillin ( - . Ancef  - current  · Vancomycin (, )  · Ceftriaxone ( - 5/10, -)  · Zosyn ()    Subjective:   L shoulder pain worse, otherwise ok. Encouraged her to walk. No Known Allergies     Review of Systems:  A comprehensive review of systems was negative except for that written in the History of Present Illness. Objective:     Visit Vitals  /73   Pulse (!) 108   Temp 98.4 °F (36.9 °C)   Resp 19   Ht 5' 3\" (1.6 m)   Wt 68.2 kg (150 lb 6.4 oz)   SpO2 97%   Breastfeeding?  Yes   BMI 26.64 kg/m²     Temp (24hrs), Av.1 °F (36.7 °C), Min:98 °F (36.7 °C), Max:98.4 °F (36.9 °C)    Lines:  RUE PICC       Physical Exam:    General:  Cooperative, ill appearing but in NAD, appears stated age   Eyes:  Sclera anicteric, not injected and no drainage   Mouth/Throat: Mucosa moist, op clear, multiple broken and missing teeth   Lungs:   Clear to auscultation, room air   CV:  Regular rate and rhythm, no audible murmur   Abdomen:   Soft, non tender, non distended, active bowel sounds   Extremities: Limited ROM arms, L>R, LE weak   Skin: Multiple tattoos and piercings, no rash   Psych: Alert and oriented, appropriate        Data Review:     CBC:  No results for input(s): WBC, GRANS, MONOS, EOS, ANEU, ABL, HGB, HCT, PLT, HGBEXT, HCTEXT, PLTEXT, HGBEXT, HCTEXT, PLTEXT in the last 72 hours. No lab exists for component: LYMPHS,  MAILE    BMP:  No results for input(s): CREA, BUN, NA, K, CL, CO2, AGAP, GLU in the last 72 hours. LFTS:  No results for input(s): TBILI, ALT, SGOT, AP, TP, ALB in the last 72 hours. Microbiology:     All Micro Results     Procedure Component Value Units Date/Time    CULTURE, BLOOD [[id number]] Collected:  05/24/19 1046    Order Status:  Completed Specimen:  Blood Updated:  05/29/19 0645     Special Requests: --        LEFT  HAND       Culture result: NO GROWTH 5 DAYS       CULTURE, BLOOD [[id number]] Collected:  05/24/19 1049    Order Status:  Completed Specimen:  Blood Updated:  05/29/19 0645     Special Requests: --        RIGHT  ARM       Culture result: NO GROWTH 5 DAYS       CULTURE, BLOOD [[id number]] Collected:  05/14/19 0752    Order Status:  Completed Specimen:  Blood Updated:  05/19/19 0641     Special Requests: LEFT HAND        Culture result: NO GROWTH 5 DAYS       CULTURE, BLOOD [[id number]]  (Abnormal) Collected:  05/11/19 0854    Order Status:  Completed Specimen:  Blood Updated:  05/14/19 0719     Special Requests: --        RIGHT  ARM       GRAM STAIN       GRAM POS COCCI IN CLUSTERS            PEDIATRIC BOTTLE               CRITICAL RESULT NOT CALLED DUE TO PREVIOUS NOTIFICATION OF CRITICAL RESULT WITHIN THE LAST 24 HOURS.            Culture result: STAPHYLOCOCCUS AUREUS         FOR SUSCEPTIBILITY REFER TO [id number]    CULTURE, BLOOD [[id number]]  (Abnormal) Collected:  05/11/19 0854    Order Status:  Completed Specimen:  Blood Updated:  05/14/19 0718     Special Requests: --        LEFT  HAND       GRAM STAIN GRAM POSITIVE COCCI         PEDIATRIC BOTTLE               RESULTS VERIFIED, PHONED TO AND READ BACK BY Jonoamelie Mercy @0889 05/12/2019 JB           Culture result: STAPHYLOCOCCUS AUREUS         FOR SUSCEPTIBILTIY REFER TO [address] [place] [address] Drive NO [id number]    CULTURE, BLOOD [[id number]]  (Abnormal) Collected:  05/09/19 1822    Order Status:  Completed Specimen:  Whole Blood Updated:  05/14/19 0717     Special Requests: --        RIGHT  FOREARM       GRAM STAIN       GRAM POS COCCI IN CLUSTERS                  AEROBIC AND ANAEROBIC BOTTLES                  CRITICAL RESULT NOT CALLED DUE TO PREVIOUS NOTIFICATION OF CRITICAL RESULT WITHIN THE LAST 24 HOURS. Culture result: STAPHYLOCOCCUS AUREUS         FOR SUSCEPTIBILITY REFER TO ACC NO [id number]    CULTURE, BLOOD [[id number]]  (Abnormal)  (Susceptibility) Collected:  05/09/19 1903    Order Status:  Completed Specimen:  Whole Blood Updated:  05/14/19 0713     Special Requests: --        RIGHT  Antecubital       GRAM STAIN       GRAM POS COCCI IN CLUSTERS                  AEROBIC AND ANAEROBIC BOTTLES                  RESULTS VERIFIED, PHONED TO AND READ BACK BY Bill Urbina RN @ 3952 ON 5/10/19 BY ADS           Culture result: STAPHYLOCOCCUS AUREUS               MRSA target DNA sequences not detected: SA target DNA sequence detected within the sample.  Test performed by PCR  Culture/Sensitivity to follow          CULTURE, URINE [[id number]]  (Abnormal)  (Susceptibility) Collected:  05/09/19 2027    Order Status:  Completed Specimen:  Urine from Clean catch Updated:  05/13/19 0794     Special Requests: NO SPECIAL REQUESTS        Culture result:       >100,000 COLONIES/mL ESCHERICHIA COLI                  10,000 to 50,000 COLONIES/mL STAPHYLOCOCCUS AUREUS                Imaging:   MRI L-spine 5/29/19  IMPRESSION  IMPRESSION:  1.  T12-L1 and L3-4 right facet inflammatory changes still present, but  improved. 2.  Right SI joint inflammatory changes also appear improved. 3.  Stable mild edema and enhancement in the L1 vertebral body. This could also  represent an inflammatory process with mild osteomyelitis not excluded.         Signed By: Geno Mike MD     May 31, 2019

## 2019-05-31 NOTE — PROGRESS NOTES
Patient continues on IV Abx until 6/24/2019. She will remain hospitalized until that date due to history of IV drug abuse. Case Management will continue to follow.     Care Management Interventions  PCP Verified by CM: No  Transition of Care Consult (CM Consult): Discharge Planning  Discharge Durable Medical Equipment: No  Physical Therapy Consult: No  Occupational Therapy Consult: No  Speech Therapy Consult: No  Current Support Network: Own Home  Confirm Follow Up Transport: Self  Plan discussed with Pt/Family/Caregiver: Yes  Freedom of Choice Offered: Yes  Discharge Location  Discharge Placement: Home

## 2019-05-31 NOTE — PROGRESS NOTES
Asked to start a PIV. Using U/S assistance, placed a 20g 1 3/4\" PIV in patients right arm. Primary RN informed.

## 2019-05-31 NOTE — PROGRESS NOTES
Pt resting in the bed with lights off. Pt is stable. Pt is axo. IV ancef was unable to be given due to poor IV site. PICC team called, left VM, awaiting return call. No needs expressed from pt at this time. Safety measures are in place. Will continue to monitor.

## 2019-05-31 NOTE — PROGRESS NOTES
Hospitalist Progress Note    2019  Admit Date: 2019  5:26 PM   NAME: Debo Nazario   :  1985   MRN:  [id number]   Attending: Cm Vela MD  PCP:  None    SUBJECTIVE:   Patient is a 25 yo F admitted 19 with MSSA bacteremia due to IV drug abuse. Complaining of some shoulder pain and low back pain. MRI spine on  improved; L shoulder MRI pending. She wants to go to [email or web address]'s graduation supper on 6/3. ID okay with discharge with rx for keflex and plan for re-admission to finish IV abx. Review of Systems negative with exception of pertinent positives noted above  PHYSICAL EXAM     Visit Vitals  /73   Pulse (!) 108   Temp 98.4 °F (36.9 °C)   Resp 19   Ht 5' 3\" (1.6 m)   Wt 68.2 kg (150 lb 6.4 oz)   SpO2 97%   Breastfeeding? Yes   BMI 26.64 kg/m²      Temp (24hrs), Av.1 °F (36.7 °C), Min:98 °F (36.7 °C), Max:98.4 °F (36.9 °C)    Patient Vitals for the past 24 hrs:   Temp Pulse Resp BP SpO2   19 0729 98.4 °F (36.9 °C) (!) 108 19 103/73 97 %   19 0005 98 °F (36.7 °C) (!) 101 18 103/71 95 %   19 98 °F (36.7 °C) (!) 108 18 110/74 96 %   19 1504 98.1 °F (36.7 °C) 100 19 103/71 95 %       Oxygen Therapy  O2 Sat (%): 97 % (19 07)  Pulse via Oximetry: 93 beats per minute (19 1639)  O2 Device: Room air (19 07)  O2 Flow Rate (L/min): 2 l/min (05/15/19 1207)    Intake/Output Summary (Last 24 hours) at 2019 1138  Last data filed at 2019 0920  Gross per 24 hour   Intake 480 ml   Output 400 ml   Net 80 ml      General: No acute distress    Lungs:  CTA Bilaterally. Heart:  Regular rate and rhythm,  No murmur, rub, or gallop  Abdomen: Soft, Non distended, Non tender, Positive bowel sounds  Extremities: No cyanosis, clubbing or edema  Neurologic:  No focal deficits    No results found for this or any previous visit (from the past 24 hour(s)).    All Micro Results     Procedure Component Value Units Date/Time    CULTURE, BLOOD [[id number]] Collected:  05/24/19 1046    Order Status:  Completed Specimen:  Blood Updated:  05/29/19 0645     Special Requests: --        LEFT  HAND       Culture result: NO GROWTH 5 DAYS       CULTURE, BLOOD [[id number]] Collected:  05/24/19 1049    Order Status:  Completed Specimen:  Blood Updated:  05/29/19 0645     Special Requests: --        RIGHT  ARM       Culture result: NO GROWTH 5 DAYS       CULTURE, BLOOD [[id number]] Collected:  05/14/19 0752    Order Status:  Completed Specimen:  Blood Updated:  05/19/19 0641     Special Requests: LEFT HAND        Culture result: NO GROWTH 5 DAYS       CULTURE, BLOOD [[id number]]  (Abnormal) Collected:  05/11/19 0854    Order Status:  Completed Specimen:  Blood Updated:  05/14/19 0719     Special Requests: --        RIGHT  ARM       GRAM STAIN       GRAM POS COCCI IN CLUSTERS            PEDIATRIC BOTTLE               CRITICAL RESULT NOT CALLED DUE TO PREVIOUS NOTIFICATION OF CRITICAL RESULT WITHIN THE LAST 24 HOURS. Culture result: STAPHYLOCOCCUS AUREUS         FOR SUSCEPTIBILITY REFER TO [id number]    CULTURE, BLOOD [[id number]]  (Abnormal) Collected:  05/11/19 0854    Order Status:  Completed Specimen:  Blood Updated:  05/14/19 0718     Special Requests: --        LEFT  HAND       GRAM STAIN GRAM POSITIVE COCCI         PEDIATRIC BOTTLE               RESULTS VERIFIED, PHONED TO AND READ BACK BY Ian Madison @8485 05/12/2019 [facility]           Culture result: STAPHYLOCOCCUS AUREUS         FOR SUSCEPTIBILTIY REFER TO [address] NO [id number]    CULTURE, BLOOD [[id number]]  (Abnormal) Collected:  05/09/19 1822    Order Status:  Completed Specimen:  Whole Blood Updated:  05/14/19 0717     Special Requests: --        RIGHT  FOREARM       GRAM STAIN       GRAM POS COCCI IN CLUSTERS                  AEROBIC AND ANAEROBIC BOTTLES                  CRITICAL RESULT NOT CALLED DUE TO PREVIOUS NOTIFICATION OF CRITICAL RESULT WITHIN THE LAST 24 HOURS.            Culture result: STAPHYLOCOCCUS AUREUS         FOR SUSCEPTIBILITY REFER TO ACC NO [id number]    CULTURE, BLOOD [[id number]]  (Abnormal)  (Susceptibility) Collected:  05/09/19 1903    Order Status:  Completed Specimen:  Whole Blood Updated:  05/14/19 0713     Special Requests: --        RIGHT  Antecubital       GRAM STAIN       GRAM POS COCCI IN CLUSTERS                  AEROBIC AND ANAEROBIC BOTTLES                  RESULTS VERIFIED, PHONED TO AND READ BACK BY Janice Reid RN @ 6406 ON 5/10/19 BY ADS           Culture result: STAPHYLOCOCCUS AUREUS               MRSA target DNA sequences not detected: SA target DNA sequence detected within the sample. Test performed by PCR  Culture/Sensitivity to follow          CULTURE, URINE [[id number]]  (Abnormal)  (Susceptibility) Collected:  05/09/19 2027    Order Status:  Completed Specimen:  Urine from Clean catch Updated:  05/13/19 0733     Special Requests: NO SPECIAL REQUESTS        Culture result:       >100,000 COLONIES/mL ESCHERICHIA COLI                  10,000 to 50,000 COLONIES/mL STAPHYLOCOCCUS AUREUS                Imaging:    MRI LUMB SPINE W WO CONT   Final Result   IMPRESSION:   1.  T12-L1 and L3-4 right facet inflammatory changes still present, but   improved. 2.  Right SI joint inflammatory changes also appear improved. 3.  Stable mild edema and enhancement in the L1 vertebral body. This could also   represent an inflammatory process with mild osteomyelitis not excluded. CT CHEST WO CONT   Final Result   IMPRESSION:     1. Cavitary nodules/masses involving all lobes of the lungs with the largest   nodule in the right lung apex measuring 3.3 cm x 2.7 cm. This appearance can be   seen as a result of septic emboli to the lungs, or could represent necrotic   pulmonary metastases. Recommend correlation for signs/symptoms of infection   which would favor septic and bladder. 2. Bilateral pleural effusions, right greater than left.  The right pleural   effusion demonstrates loculated appearing components. Empyema is not excluded if   the patient has symptoms of pneumonia. 3. Multiple findings suggesting fluid overload. MRI LUMB SPINE W WO CONT   Preliminary Result   IMPRESSION:      1. Abnormal enhancement posterior to the articular facet joints at the T12-L1,   L3-L4 and L4-L5 levels. Findings are concerning for septic facet arthritis. See   above description. Similar findings are present around the right SI joint which   is likely infected as well. 2. Minimal enhancement along the inferior endplate of L1. This may be   degenerative in etiology. Consider short follow-up MR imaging of the lumbar   spine in several weeks for further assessment. MRI [facility] SPINE W CONT   Final Result   IMPRESSION:      1. Bilateral pleural effusions with cavitary lung lesions. Correlate with   pulmonary imaging. 2. No significant thoracic spinal stenosis or findings of discitis. 2. Edema-like signal around the right-sided articular facet at T12-L1. Facet   arthritis including septic arthritis could account for this finding. See MRI   lumbar spine for further description. MRI CERV SPINE W CONT   Final Result   IMPRESSION:   1. Mild spinal stenosis at C2-C3 and C3-C4. 2. Mild to moderate spinal stenosis at C4-C5. XR CHEST SNGL V   Final Result   IMPRESSION: New faint infiltrate right base, possibly lobar pneumonia. Left lung   infiltrate is cleared.                MRI SHOULDER LT W CONT    (Results Pending)         ASSESSMENT      Hospital Problems as of 5/31/2019 Never Reviewed          Codes Class Noted - Resolved POA    Pneumonia ICD-10-CM: J18.9  ICD-9-CM: 260  5/28/2019 - Present Yes        * (Principal) Severe sepsis ([facility] [address] ICD-10-CM: A41.9, R65.20  ICD-9-CM: 038.9, 995.92  5/9/2019 - Present Yes        UTI (urinary tract infection) ICD-10-CM: N39.0  ICD-9-CM: 599.0  5/9/2019 - Present Yes        History of pulmonary embolism ICD-10-CM: N83.825  ICD-9-CM: V12.55  5/9/2019 - Present Yes        Hypokalemia ICD-10-CM: E87.6  ICD-9-CM: 276.8  5/9/2019 - Present Yes        IVDU (intravenous drug user) ICD-10-CM: F19.90  ICD-9-CM: 305.90  2/1/2019 - Present Yes            Plan:  · MSSA bacteremia  · Ancef EOT 6/24/19  · May be discharged on Sunday, 6/2, with 4 week rx for PO keflex 500 mg QID x 4 weeks in case she does not come back for re-admission to complete IV cefazolin (EOT 6/24)  · MRI shoulder today for shoulder pain. · Heroin abuse- methadone    · Pneumonia/cough  · Pneumonia likely septic emboli  · Continue mucinex and prn tessalon    DVT Prophylaxis: Xarelto     Signed By: Krzysztof Cervantes MD     May 31, 2019

## 2019-05-31 NOTE — PROGRESS NOTES
BSR received  patient is resting in bed  Respirations are even unlabored on RA with no signs of distress at this time

## 2019-06-01 PROBLEM — B95.61 MSSA BACTEREMIA: Status: ACTIVE | Noted: 2019-06-01

## 2019-06-01 PROBLEM — R65.20 SEVERE SEPSIS (HCC): Status: RESOLVED | Noted: 2019-05-09 | Resolved: 2019-06-01

## 2019-06-01 PROBLEM — E87.6 HYPOKALEMIA: Status: RESOLVED | Noted: 2019-05-09 | Resolved: 2019-06-01

## 2019-06-01 PROBLEM — R78.81 MSSA BACTEREMIA: Status: ACTIVE | Noted: 2019-06-01

## 2019-06-01 PROBLEM — A41.9 SEVERE SEPSIS (HCC): Status: RESOLVED | Noted: 2019-05-09 | Resolved: 2019-06-01

## 2019-06-01 PROCEDURE — 74011250637 HC RX REV CODE- 250/637: Performed by: FAMILY MEDICINE

## 2019-06-01 PROCEDURE — 65270000029 HC RM PRIVATE

## 2019-06-01 PROCEDURE — 97164 PT RE-EVAL EST PLAN CARE: CPT

## 2019-06-01 PROCEDURE — 74011250636 HC RX REV CODE- 250/636: Performed by: INTERNAL MEDICINE

## 2019-06-01 PROCEDURE — 97530 THERAPEUTIC ACTIVITIES: CPT

## 2019-06-01 PROCEDURE — 74011250637 HC RX REV CODE- 250/637: Performed by: INTERNAL MEDICINE

## 2019-06-01 PROCEDURE — 74011250637 HC RX REV CODE- 250/637: Performed by: HOSPITALIST

## 2019-06-01 RX ORDER — CEPHALEXIN 500 MG/1
500 CAPSULE ORAL 4 TIMES DAILY
Qty: 112 CAP | Refills: 0 | Status: SHIPPED | OUTPATIENT
Start: 2019-06-02 | End: 2019-06-15

## 2019-06-01 RX ORDER — CEPHALEXIN 500 MG/1
500 CAPSULE ORAL EVERY 6 HOURS
Status: DISCONTINUED | OUTPATIENT
Start: 2019-06-01 | End: 2019-06-02 | Stop reason: HOSPADM

## 2019-06-01 RX ADMIN — Medication 10 ML: at 21:45

## 2019-06-01 RX ADMIN — Medication 2 G: at 09:12

## 2019-06-01 RX ADMIN — GUAIFENESIN 1200 MG: 600 TABLET, EXTENDED RELEASE ORAL at 08:53

## 2019-06-01 RX ADMIN — Medication 1 AMPULE: at 08:53

## 2019-06-01 RX ADMIN — METHADONE HYDROCHLORIDE 10 MG: 10 TABLET ORAL at 16:37

## 2019-06-01 RX ADMIN — Medication 10 ML: at 05:48

## 2019-06-01 RX ADMIN — Medication 2 G: at 00:53

## 2019-06-01 RX ADMIN — Medication 1 AMPULE: at 21:45

## 2019-06-01 RX ADMIN — METHADONE HYDROCHLORIDE 10 MG: 10 TABLET ORAL at 08:53

## 2019-06-01 RX ADMIN — Medication 10 ML: at 14:00

## 2019-06-01 RX ADMIN — TRAMADOL HYDROCHLORIDE 50 MG: 50 TABLET, FILM COATED ORAL at 19:31

## 2019-06-01 RX ADMIN — CEPHALEXIN 500 MG: 500 CAPSULE ORAL at 19:31

## 2019-06-01 NOTE — PROGRESS NOTES
IV infiltrated. MD paged with new orders to start Kflex 500 mg q 6 r/t patient to be d/c in the AM.  IV to be taken out. Will monitor.

## 2019-06-01 NOTE — PROGRESS NOTES
Patient slept well throughout the night  Respirations are even unlabored on RA with no signs of distress at this time  Will give BSR to oncoming RN

## 2019-06-01 NOTE — DISCHARGE SUMMARY
Hospitalist Discharge Summary     Patient ID:  Curtis Rose  [id number]  7 y.o.  1985  Admit date: 5/9/2019  5:26 PM  Discharge date and time: 6/1/2019  Attending: Maddy Anguiano MD  PCP:  None  Treatment Team: Attending Provider: Maddy Anguiano MD; Care Manager: Osvaldo Tim, PRABHA; Utilization Review: Juan C Henderson RN; Consulting Provider: Sami Rae MD; Consulting Provider: Kemi Cornejo MD; Physical Therapist: Vanda Lynch PT    Principal Diagnosis MSSA bacteremia   Hospital Problems as of 6/1/2019 Never Reviewed          Codes Class Noted - Resolved POA    * (Principal) MSSA bacteremia ICD-10-CM: R78.81  ICD-9-CM: 790.7, 041.11  6/1/2019 - Present Yes        Pneumonia ICD-10-CM: J18.9  ICD-9-CM: 699  5/28/2019 - Present Yes        UTI (urinary tract infection) ICD-10-CM: N39.0  ICD-9-CM: 599.0  5/9/2019 - Present Yes        IVDU (intravenous drug user) ICD-10-CM: F19.90  ICD-9-CM: 305.90  2/1/2019 - Present Yes        RESOLVED: Severe sepsis ([address] ICD-10-CM: A41.9, R65.20  ICD-9-CM: 038.9, 995.92  5/9/2019 - 6/1/2019 Yes        RESOLVED: Hypokalemia ICD-10-CM: E87.6  ICD-9-CM: 276.8  5/9/2019 - 6/1/2019 Yes                 HPI:  'Curtis Rose is a 26 y.o. female with a past medical history of IV drug use who presents to the ER with report of 3 days of feeling chills and body aches all over. She denies any shana fevers, nausea, vomiting, dysuria, cough, congestion.'      Hospital Course:  Please refer to the admission H&P for details of presentation. In summary, the patient is a 25 yo F with history of IVDU, who was admitted with sepsis on 5/9. She was found to have MSSA bacteremia and pneumonia from septic emboli. Blood cultures from 5/14 and 5/24 negative. She was seen by ID and recommended for IV Cefazolin through 6/24. She could not have IV OPAT due to her IV drug use.   She really wants to go to her niece's graduation supper on 6/3 and ID has okayed her to be discharged on 6/2 with prescription of keflex (in case she does not return) and to be re-admitted 6/3 after the graduation event. She had 2 MRIs of lumbar spine due to back pain and MRI of L shoulder due to shoulder pain. Possible L1 vertebral osteomyelitis (mild and stable). Found to have acute infectious/inflammatory L subacromial/subdeltoid bursitis. She was counseled that she should return to complete IV abx therapy, but she will be provided with prescription for PO abx if she does not return. Significant Diagnostic Studies:       Labs: Results:       Chemistry No results for input(s): GLU, NA, K, CL, CO2, BUN, CREA, CA, AGAP, BUCR, TBIL, GPT, AP, TP, ALB, GLOB, AGRAT in the last 72 hours. CBC w/Diff No results for input(s): WBC, RBC, HGB, HCT, PLT, GRANS, LYMPH, EOS, HGBEXT, HCTEXT, PLTEXT, HGBEXT, HCTEXT, PLTEXT in the last 72 hours. Cardiac Enzymes No results for input(s): CPK, CKND1, NO in the last 72 hours. No lab exists for component: CKRMB, TROIP   Coagulation No results for input(s): PTP, INR, APTT in the last 72 hours. No lab exists for component: INREXT, INREXT    Lipid Panel No results found for: CHOL, CHOLPOCT, CHOLX, CHLST, CHOLV, 971203, HDL, LDL, LDLC, DLDLP, 546750, VLDLC, VLDL, TGLX, TRIGL, TRIGP, TGLPOCT, CHHD, CHHDX   BNP No results for input(s): BNPP in the last 72 hours. Liver Enzymes No results for input(s): TP, ALB, TBIL, AP, SGOT, GPT in the last 72 hours.     No lab exists for component: DBIL   Thyroid Studies No results found for: T4, T3U, TSH, TSHEXT, TSHEXT       All Micro Results     Procedure Component Value Units Date/Time    CULTURE, BLOOD [[id number]] Collected:  05/24/19 1046    Order Status:  Completed Specimen:  Blood Updated:  05/29/19 0669     Special Requests: --        LEFT  HAND       Culture result: NO GROWTH 5 DAYS       CULTURE, BLOOD [[id number]] Collected:  05/24/19 1049    Order Status:  Completed Specimen:  Blood Updated:  05/29/19 6169 Special Requests: --        RIGHT  ARM       Culture result: NO GROWTH 5 DAYS       CULTURE, BLOOD [[id number]] Collected:  05/14/19 0752    Order Status:  Completed Specimen:  Blood Updated:  05/19/19 0641     Special Requests: LEFT HAND        Culture result: NO GROWTH 5 DAYS       CULTURE, BLOOD [[id number]]  (Abnormal) Collected:  05/11/19 0854    Order Status:  Completed Specimen:  Blood Updated:  05/14/19 0719     Special Requests: --        RIGHT  ARM       GRAM STAIN       GRAM POS COCCI IN CLUSTERS            PEDIATRIC BOTTLE               CRITICAL RESULT NOT CALLED DUE TO PREVIOUS NOTIFICATION OF CRITICAL RESULT WITHIN THE LAST 24 HOURS. Culture result: STAPHYLOCOCCUS AUREUS         FOR SUSCEPTIBILITY REFER TO [id number]    CULTURE, BLOOD [[id number]]  (Abnormal) Collected:  05/11/19 0854    Order Status:  Completed Specimen:  Blood Updated:  05/14/19 0718     Special Requests: --        LEFT  HAND       GRAM STAIN GRAM POSITIVE COCCI         PEDIATRIC BOTTLE               RESULTS VERIFIED, PHONED TO AND READ BACK BY Dagoberto Walden @[facility] 05/12/2019 [facility]           Culture result: STAPHYLOCOCCUS AUREUS         FOR SUSCEPTIBILTIY REFER TO [address] [place] [address] Drive NO [id number]    CULTURE, BLOOD [[id number]]  (Abnormal) Collected:  05/09/19 1822    Order Status:  Completed Specimen:  Whole Blood Updated:  05/14/19 0717     Special Requests: --        RIGHT  FOREARM       GRAM STAIN       GRAM POS COCCI IN CLUSTERS                  AEROBIC AND ANAEROBIC BOTTLES                  CRITICAL RESULT NOT CALLED DUE TO PREVIOUS NOTIFICATION OF CRITICAL RESULT WITHIN THE LAST 24 HOURS.            Culture result: STAPHYLOCOCCUS AUREUS         FOR SUSCEPTIBILITY REFER TO ACC NO [id number]    CULTURE, BLOOD [[id number]]  (Abnormal)  (Susceptibility) Collected:  05/09/19 1903    Order Status:  Completed Specimen:  Whole Blood Updated:  05/14/19 0713     Special Requests: --        RIGHT  Antecubital       GRAM STAIN       GRAM POS COCCI IN CLUSTERS                  AEROBIC AND ANAEROBIC BOTTLES                  RESULTS VERIFIED, PHONED TO AND READ BACK BY Sindi Dickerson, RN @ 5323 ON 5/10/19 BY ADS           Culture result: STAPHYLOCOCCUS AUREUS               MRSA target DNA sequences not detected: SA target DNA sequence detected within the sample. Test performed by PCR  Culture/Sensitivity to follow          CULTURE, URINE [[id number]]  (Abnormal)  (Susceptibility) Collected:  05/09/19 2027    Order Status:  Completed Specimen:  Urine from Clean catch Updated:  05/13/19 0716     Special Requests: NO SPECIAL REQUESTS        Culture result:       >100,000 COLONIES/mL ESCHERICHIA COLI                  10,000 to 50,000 COLONIES/mL STAPHYLOCOCCUS AUREUS                Imaging:    MRI SHOULDER LT W WO CONT   Final Result   IMPRESSION:   1. Acute infectious/inflammatory subacromial/subdeltoid bursitis suspected with   a reactive myositis in the anterior belly and possibly the middle belly of the   deltoid. 2. No bone marrow signal changes noted at this point to strongly indicate   osteomyelitis. 3. The glenohumeral and AC joints are unremarkable. No septic arthrosis   suspected at this point. 4. The rotator cuff tendon is intact. MRI LUMB SPINE W WO CONT   Final Result   IMPRESSION:   1.  T12-L1 and L3-4 right facet inflammatory changes still present, but   improved. 2.  Right SI joint inflammatory changes also appear improved. 3.  Stable mild edema and enhancement in the L1 vertebral body. This could also   represent an inflammatory process with mild osteomyelitis not excluded. CT CHEST WO CONT   Final Result   IMPRESSION:     1. Cavitary nodules/masses involving all lobes of the lungs with the largest   nodule in the right lung apex measuring 3.3 cm x 2.7 cm. This appearance can be   seen as a result of septic emboli to the lungs, or could represent necrotic   pulmonary metastases.  Recommend correlation for signs/symptoms of infection   which would favor septic and bladder. 2. Bilateral pleural effusions, right greater than left. The right pleural   effusion demonstrates loculated appearing components. Empyema is not excluded if   the patient has symptoms of pneumonia. 3. Multiple findings suggesting fluid overload. MRI LUMB SPINE W WO CONT   Preliminary Result   IMPRESSION:      1. Abnormal enhancement posterior to the articular facet joints at the T12-L1,   L3-L4 and L4-L5 levels. Findings are concerning for septic facet arthritis. See   above description. Similar findings are present around the right SI joint which   is likely infected as well. 2. Minimal enhancement along the inferior endplate of L1. This may be   degenerative in etiology. Consider short follow-up MR imaging of the lumbar   spine in several weeks for further assessment. MRI [facility] SPINE W CONT   Final Result   IMPRESSION:      1. Bilateral pleural effusions with cavitary lung lesions. Correlate with   pulmonary imaging. 2. No significant thoracic spinal stenosis or findings of discitis. 2. Edema-like signal around the right-sided articular facet at T12-L1. Facet   arthritis including septic arthritis could account for this finding. See MRI   lumbar spine for further description. MRI CERV SPINE W CONT   Final Result   IMPRESSION:   1. Mild spinal stenosis at C2-C3 and C3-C4. 2. Mild to moderate spinal stenosis at C4-C5. XR CHEST SNGL V   Final Result   IMPRESSION: New faint infiltrate right base, possibly lobar pneumonia. Left lung   infiltrate is cleared.                  Echo-   Results for orders placed or performed during the hospital encounter of 05/09/19   2D ECHO COMPLETE ADULT (TTE) W OR [address] [facility]  [address], [address] [facility]  [phone]    Transthoracic Echocardiogram  2D, M-mode, Doppler, and Color Doppler    Patient: Riana Can  MR #: [id number]  : 1985  Age: 26 years  Gender: Female  Study date: 13-May-2019  Account #: [de-identified]  Height: 63 in  Weight: 169.6 lb  BSA: 1.8 mï¾²  Status:Routine  Location: 816  BP: 109/ 73    Allergies: NO KNOWN ALLERGENS    Sonographer:  Brittanie Messina RDCS  Group:  [facility]  Referring Physician:  Gulshan Chamberlain MD  Reading Physician:  Berenice Ayala MD [facility]    INDICATIONS: Assess LV Function. PROCEDURE: This was a routine study. A transthoracic echocardiogram was  performed. The study included complete 2D imaging, M-mode, complete spectral  Doppler, and color Doppler. Echocardiographic views were limited by poor  patient compliance. Image quality was adequate. LEFT VENTRICLE: Size was normal. Systolic function was normal. Ejection  fraction was estimated in the range of 55 % to 60 %. There were no regional  wall motion abnormalities. Wall thickness was normal. The E/e' ratio was   7.65. Left ventricular diastolic function parameters were normal.    RIGHT VENTRICLE: The size was normal. Systolic function was normal. The  tricuspid jet envelope definition was inadequate for estimation of RV   systolic  pressure. LEFT ATRIUM: Size was normal.    RIGHT ATRIUM: Size was normal.    SYSTEMIC VEINS: IVC: The inferior vena cava was normal in size. The  respirophasic change in diameter was less than 50%. AORTIC VALVE: The valve was probably trileaflet. There was no evidence for  stenosis. There was no insufficiency. MITRAL VALVE: Valve structure was normal. There was no evidence for stenosis. There was mild regurgitation. TRICUSPID VALVE: The valve structure was normal. There was no evidence for  stenosis. There was mild regurgitation. PULMONIC VALVE: Not well visualized. There was no evidence for stenosis. There  was trivial regurgitation. PERICARDIUM: There was no pericardial effusion. AORTA: The root exhibited normal size.     SUMMARY:    - Left ventricle: Systolic function was normal. Ejection fraction was  estimated in the range of 55 % to 60 %. There were no regional wall motion  abnormalities. -  Inferior vena cava, hepatic veins: The respirophasic change in diameter   was  less than 50%. -  Mitral valve: There was mild regurgitation.    -  Tricuspid valve: There was mild regurgitation. SYSTEM MEASUREMENT TABLES    2D mode  AoR Diam (2D): 2.6 cm  LA Dimension (2D): 3.5 cm  Left Atrium Systolic Volume Index; Method of Disks, Biplane; 2D mode;: 28.1  ml/m2  IVS/LVPW (2D): 1  IVSd (2D): 0.9 cm  LVIDd (2D): 4.4 cm  LVIDs (2D): 3 cm  LVOT Area (2D): 3.1 cm2  LVPWd (2D): 0.9 cm  RVIDd (2D): 2.9 cm    Tissue Doppler Imaging  LV Peak Early Rocha Tissue Zheng; Lateral MA (TDI): 16.7 cm/s  LV Peak Early Rocha Tissue Zheng; Medial MA (TDI): 12.7 cm/s    Unspecified Scan Mode  Peak Grad; Mean; Antegrade Flow: 16 mm[Hg]  Vmax; Antegrade Flow: 195 cm/s  LVOT Diam: 2 cm  MV Peak Zheng/LV Peak Tissue Zheng E-Wave; Lateral MA: 6.6  MV Peak Zheng/LV Peak Tissue Zheng E-Wave; Medial MA: 8.7    Prepared and signed by    Carolyn Cannon MD [facility]  Signed 13-May-2019 18:30:36       Discharge Exam:  Visit Vitals  BP (!) 87/58   Pulse 100   Temp 98.3 °F (36.8 °C)   Resp 18   Ht 5' 3\" (1.6 m)   Wt 68.2 kg (150 lb 6.4 oz)   SpO2 98%   Breastfeeding?  Yes   BMI 26.64 kg/m²     Patient Vitals for the past 24 hrs:   Temp Pulse Resp BP SpO2   06/01/19 1100 98 °F (36.7 °C) (!) 101 20 (!) 89/60 98 %   06/01/19 0700 98.3 °F (36.8 °C) 100 18 (!) 87/58 98 %   06/01/19 0231 99.1 °F (37.3 °C) (!) 104 18 96/57 98 %   05/31/19 2308 99.1 °F (37.3 °C) (!) 102 18 92/64 100 %   05/31/19 1922 98.9 °F (37.2 °C) (!) 116 18 95/66 97 %   05/31/19 1532 98.7 °F (37.1 °C) (!) 116 19 119/81 100 %   05/31/19 1148 98.9 °F (37.2 °C) (!) 109 18 105/68 92 %       General appearance: alert, cooperative, no distress, appears stated age  Lungs: clear to auscultation bilaterally  Heart: regular rate and rhythm, S1, S2 normal, no murmur, click, rub or gallop  Abdomen: soft, non-tender. Bowel sounds normal. No masses,  no organomegaly  Extremities: no cyanosis or edema, pain to move L shoulder  Neurologic: Grossly normal    Disposition: home  Discharge Condition: stable  Patient Instructions:   Current Discharge Medication List      START taking these medications    Details   cephALEXin (KEFLEX) 500 mg capsule Take 1 Cap by mouth four (4) times daily for 28 days. Indications: MSSA bacteremia  Qty: 112 Cap, Refills: 0         STOP taking these medications       rivaroxaban (XARELTO) 15 mg (42)- 20 mg (9) DsPk Comments:   Reason for Stopping:               Activity: Activity as tolerated  Diet: Regular Diet    Follow-up  ·   To return to hospital after family event to complete IV antibiotic (Cefazolin) with EOT planned for 6/24  Time spent to discharge patient 28 minutes  Signed:  Marcos Sandhu.  Aftab Pierson MD  6/1/2019  11:20 AM

## 2019-06-01 NOTE — PROGRESS NOTES
Patient stable with no complaints at this time. Patient is resting in bed watching TV. Call light within reach and patient instructed to call if assistance is needed.   Report to be given to oncoming RN 7p-7a

## 2019-06-02 VITALS
HEART RATE: 90 BPM | TEMPERATURE: 98 F | RESPIRATION RATE: 19 BRPM | HEIGHT: 63 IN | DIASTOLIC BLOOD PRESSURE: 61 MMHG | SYSTOLIC BLOOD PRESSURE: 92 MMHG | OXYGEN SATURATION: 97 % | WEIGHT: 150.4 LBS | BODY MASS INDEX: 26.65 KG/M2

## 2019-06-02 PROCEDURE — 74011250637 HC RX REV CODE- 250/637: Performed by: INTERNAL MEDICINE

## 2019-06-02 PROCEDURE — 74011250637 HC RX REV CODE- 250/637: Performed by: HOSPITALIST

## 2019-06-02 RX ADMIN — METHADONE HYDROCHLORIDE 10 MG: 10 TABLET ORAL at 08:58

## 2019-06-02 RX ADMIN — CEPHALEXIN 500 MG: 500 CAPSULE ORAL at 00:20

## 2019-06-02 RX ADMIN — TRAMADOL HYDROCHLORIDE 50 MG: 50 TABLET, FILM COATED ORAL at 02:13

## 2019-06-02 RX ADMIN — CEPHALEXIN 500 MG: 500 CAPSULE ORAL at 05:47

## 2019-06-02 RX ADMIN — Medication 10 ML: at 05:47

## 2019-06-02 RX ADMIN — GUAIFENESIN 1200 MG: 600 TABLET, EXTENDED RELEASE ORAL at 08:58

## 2019-06-02 NOTE — PROGRESS NOTES
Pt is stable. Alert and oriented x4. Respirations even and unlabored. Heart sounds regular. Is currently resting in bed. Does not appear to be in distress. Bed is in low position, wheels locked and call light in reach. Safety measure in place. Pt is stable. SBAR report given to oncoming nurse.

## 2019-06-02 NOTE — DISCHARGE SUMMARY
Discharge Summary     Patient: Mary Ohara MRN: [id number]  SSN: [id number]    YOB: 1985  Age: 26 y.o. Sex: female       Admit Date: 5/9/2019    Discharge Date: 6/2/2019      Admission Diagnoses: Severe sepsis ([facility] [A41.9, R65.20]    Discharge Diagnoses:   Problem List as of 6/2/2019 Never Reviewed          Codes Class Noted - Resolved    * (Principal) MSSA bacteremia ICD-10-CM: R78.81  ICD-9-CM: 790.7, 041.11  6/1/2019 - Present        Pneumonia ICD-10-CM: J18.9  ICD-9-CM: 486  5/28/2019 - Present        UTI (urinary tract infection) ICD-10-CM: N39.0  ICD-9-CM: 599.0  5/9/2019 - Present        IVDU (intravenous drug user) ICD-10-CM: F19.90  ICD-9-CM: 305.90  2/1/2019 - Present        RESOLVED: Severe sepsis ([facility] ICD-10-CM: A41.9, R65.20  ICD-9-CM: 038.9, 995.92  5/9/2019 - 6/1/2019        RESOLVED: Hypokalemia ICD-10-CM: E87.6  ICD-9-CM: 276.8  5/9/2019 - 6/1/2019        RESOLVED: Shortness of breath ICD-10-CM: R06.02  ICD-9-CM: 786.05  2/1/2019 - 5/9/2019        RESOLVED: Acute pulmonary embolism ([facility] ICD-10-CM: I26.99  ICD-9-CM: 415.19  2/1/2019 - 5/9/2019        RESOLVED: Hospital-acquired bacterial pneumonia ICD-10-CM: J15.9  ICD-9-CM: 482.9  2/1/2019 - 5/9/2019        RESOLVED: Drug abuse ([facility] ICD-10-CM: F19.10  ICD-9-CM: 305.90  11/12/2018 - 5/9/2019        RESOLVED: Cellulitis of right arm ICD-10-CM: O25.417  ICD-9-CM: 682.3  11/11/2018 - 5/9/2019               Discharge Condition: Good    Hospital Course: Mary Ohara is a 26 y.o. female with a past medical history of active IV drug use (heroin) who presented to the ER on 5/9/19 with report of 3 days of feeling chills and body aches all over. Pt was found to be septic and was admitted for treatment, presumably from UTI. However, her blood cultures that were obtained at admission grew MSSA. UCx was positive for E. Coli. Her bacteremia is due to her IVDA. Pt underwent TTE and TC for her MSSA bacteremia.  Ne vegetations or evidence of endocarditis was seen. Pt also had complaint of back pain, so MRI lumbar, thoracic and cervical spine was performed. 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 CT chest was performed which revealed cavitary nodules/masses involving all lobes of the lungs with the largest nodule in the right lung apex measuring 3.3 cm x 2.7 cm. These were diagnosed as septic emboli. Pt also with bilateral pleural effusions, R > L, with concern for loculations. After almost 2 weeks of therapy, patient had repeat MRI, which showed continued inflammatory changes, but improved since prior imaging. For antibiotics, pt was initially started on Rocephin, Zosyn and Vancomycin. ID was consulted and patient was started on Nafcillin and then switched to Ancef due to diarrhea and volume overload. Per ID recommendations, she should have therapy through 6/24/19. However, pt wanted to see her sister graduate from high school on 6/3. Because of patient's IVDA, she is not a candidate for PICC placement and outpatient treatment. Per ID, patient cleared to be discharged home on 6/2 with Keflex and is strongly encouraged to return to the hospital on 6/3 after graduation to complete her IV antibiotic therapy. She is prescribed 4 weeks of Keflex in case she decides to not return to the hospital.     Pt was started on methadone for her IV heroin abuse. She was encouraged to avoid heroin and recommended several resources for help, including [facility], as an outpatient. Ideally patient will return to the hospital and an initial appointment for methadone clinic could be facilitated closer to her anticipated discharge late June.      Consults: Cardiology and Infectious Disease    Significant Diagnostic Studies: imaging as per above, blood cultures as per above    Physical exam:  Visit Vitals  BP 92/61   Pulse 90   Temp 98 °F (36.7 °C)   Resp 19   Ht 5' 3\" (1.6 m)   Wt 68.2 kg (150 lb 6.4 oz)   SpO2 97%   Breastfeeding? Yes   BMI 26.64 kg/m²     General:  Alert, cooperative, no distress. Head:  Normocephalic, without obvious abnormality, atraumatic. Eyes:  Conjunctivae/corneas clear. Pupils equal, round, reactive to light. Extraocular movements intact. Lungs:   Clear to auscultation bilaterally. Chest wall:  No tenderness or deformity. Heart:  Regular rate and rhythm, S1, S2 normal, no murmur, click, rub, or gallop. Abdomen:   Soft, non-tender. Bowel sounds normal. No masses. No organomegaly. Extremities: Extremities normal, atraumatic, no cyanosis or edema. Pulses: 2+ and symmetric all extremities. Skin: Skin color, texture, turgor normal. No rashes or lesions. Lymph nodes: Cervical, supraclavicular, and axillary nodes normal.   Neurologic: CNII-XII intact. Normal strength, sensation, and reflexes throughout. Data Review:  I have reviewed all labs, meds, telemetry events, and studies from the last 24 hours:    No results found for this or any previous visit (from the past 24 hour(s)).      All Micro Results     Procedure Component Value Units Date/Time    CULTURE, BLOOD [[id number]] Collected:  05/24/19 1046    Order Status:  Completed Specimen:  Blood Updated:  05/29/19 0645     Special Requests: --        LEFT  HAND       Culture result: NO GROWTH 5 DAYS       CULTURE, BLOOD [[id number]] Collected:  05/24/19 1049    Order Status:  Completed Specimen:  Blood Updated:  05/29/19 0645     Special Requests: --        RIGHT  ARM       Culture result: NO GROWTH 5 DAYS       CULTURE, BLOOD [[id number]] Collected:  05/14/19 0752    Order Status:  Completed Specimen:  Blood Updated:  05/19/19 0641     Special Requests: LEFT HAND        Culture result: NO GROWTH 5 DAYS       CULTURE, BLOOD [[id number]]  (Abnormal) Collected:  05/11/19 0854    Order Status:  Completed Specimen:  Blood Updated:  05/14/19 0719     Special Requests: --        RIGHT  ARM       GRAM STAIN       GRAM POS COCCI IN CLUSTERS            PEDIATRIC BOTTLE               CRITICAL RESULT NOT CALLED DUE TO PREVIOUS NOTIFICATION OF CRITICAL RESULT WITHIN THE LAST 24 HOURS. Culture result: STAPHYLOCOCCUS AUREUS         FOR SUSCEPTIBILITY REFER TO [id number]    CULTURE, BLOOD [[id number]]  (Abnormal) Collected:  05/11/19 0854    Order Status:  Completed Specimen:  Blood Updated:  05/14/19 0718     Special Requests: --        LEFT  HAND       GRAM STAIN GRAM POSITIVE COCCI         PEDIATRIC BOTTLE               RESULTS VERIFIED, PHONED TO AND READ BACK BY Rose Hernandez @2797 05/12/2019 [facility]           Culture result: STAPHYLOCOCCUS AUREUS         FOR SUSCEPTIBILTIY REFER TO [address] [place] [address] Drive NO [id number]    CULTURE, BLOOD [[id number]]  (Abnormal) Collected:  05/09/19 1822    Order Status:  Completed Specimen:  Whole Blood Updated:  05/14/19 0717     Special Requests: --        RIGHT  FOREARM       GRAM STAIN       GRAM POS COCCI IN CLUSTERS                  AEROBIC AND ANAEROBIC BOTTLES                  CRITICAL RESULT NOT CALLED DUE TO PREVIOUS NOTIFICATION OF CRITICAL RESULT WITHIN THE LAST 24 HOURS. Culture result: STAPHYLOCOCCUS AUREUS         FOR SUSCEPTIBILITY REFER TO ACC NO [id number]    CULTURE, BLOOD [[id number]]  (Abnormal)  (Susceptibility) Collected:  05/09/19 1903    Order Status:  Completed Specimen:  Whole Blood Updated:  05/14/19 0713     Special Requests: --        RIGHT  Antecubital       GRAM STAIN       GRAM POS COCCI IN CLUSTERS                  AEROBIC AND ANAEROBIC BOTTLES                  RESULTS VERIFIED, PHONED TO AND READ BACK BY Carlton Betancur RN @ 0184 ON 5/10/19 BY ADS           Culture result: STAPHYLOCOCCUS AUREUS               MRSA target DNA sequences not detected: SA target DNA sequence detected within the sample.  Test performed by PCR  Culture/Sensitivity to follow          CULTURE, URINE [[id number]]  (Abnormal)  (Susceptibility) Collected:  19    Order Status:  Completed Specimen:  Urine from Clean catch Updated:  19 0733     Special Requests: NO SPECIAL REQUESTS        Culture result:       >100,000 COLONIES/mL ESCHERICHIA COLI                  10,000 to 50,000 COLONIES/mL STAPHYLOCOCCUS AUREUS                Results for orders placed or performed during the hospital encounter of 19   2D ECHO COMPLETE ADULT (TTE) W OR WO CONTR    Narrative    Maddy Jaime [address] [facility], [address] [facility]  [phone]    Transthoracic Echocardiogram  2D, M-mode, Doppler, and Color Doppler    Patient: Deisi Jaimes  MR #: [id number]  : 1985  Age: 26 years  Gender: Female  Study date: 13-May-2019  Account #: [de-identified]  Height: 63 in  Weight: 169.6 lb  BSA: 1.8 mï¾²  Status:Routine  Location: 816  BP: 109/ 73    Allergies: NO KNOWN ALLERGENS    Sonographer:  Chinyere Pendleton RD  Group:  [facility]  Referring Physician:  Hansa Valdes MD  Reading Physician:  Flakita Veras MD [facility]    INDICATIONS: Assess LV Function. PROCEDURE: This was a routine study. A transthoracic echocardiogram was  performed. The study included complete 2D imaging, M-mode, complete spectral  Doppler, and color Doppler. Echocardiographic views were limited by poor  patient compliance. Image quality was adequate. LEFT VENTRICLE: Size was normal. Systolic function was normal. Ejection  fraction was estimated in the range of 55 % to 60 %. There were no regional  wall motion abnormalities. Wall thickness was normal. The E/e' ratio was   7.65. Left ventricular diastolic function parameters were normal.    RIGHT VENTRICLE: The size was normal. Systolic function was normal. The  tricuspid jet envelope definition was inadequate for estimation of RV   systolic  pressure.     LEFT ATRIUM: Size was normal.    RIGHT ATRIUM: Size was normal.    SYSTEMIC VEINS: IVC: The inferior vena cava was normal in size. The  respirophasic change in diameter was less than 50%. AORTIC VALVE: The valve was probably trileaflet. There was no evidence for  stenosis. There was no insufficiency. MITRAL VALVE: Valve structure was normal. There was no evidence for stenosis. There was mild regurgitation. TRICUSPID VALVE: The valve structure was normal. There was no evidence for  stenosis. There was mild regurgitation. PULMONIC VALVE: Not well visualized. There was no evidence for stenosis. There  was trivial regurgitation. PERICARDIUM: There was no pericardial effusion. AORTA: The root exhibited normal size. SUMMARY:    -  Left ventricle: Systolic function was normal. Ejection fraction was  estimated in the range of 55 % to 60 %. There were no regional wall motion  abnormalities. -  Inferior vena cava, hepatic veins: The respirophasic change in diameter   was  less than 50%. -  Mitral valve: There was mild regurgitation.    -  Tricuspid valve: There was mild regurgitation. SYSTEM MEASUREMENT TABLES    2D mode  AoR Diam (2D): 2.6 cm  LA Dimension (2D): 3.5 cm  Left Atrium Systolic Volume Index; Method of Disks, Biplane; 2D mode;: 28.1  ml/m2  IVS/LVPW (2D): 1  IVSd (2D): 0.9 cm  LVIDd (2D): 4.4 cm  LVIDs (2D): 3 cm  LVOT Area (2D): 3.1 cm2  LVPWd (2D): 0.9 cm  RVIDd (2D): 2.9 cm    Tissue Doppler Imaging  LV Peak Early Rocha Tissue Zheng; Lateral MA (TDI): 16.7 cm/s  LV Peak Early Rocha Tissue Zheng; Medial MA (TDI): 12.7 cm/s    Unspecified Scan Mode  Peak Grad; Mean; Antegrade Flow: 16 mm[Hg]  Vmax;  Antegrade Flow: 195 cm/s  LVOT Diam: 2 cm  MV Peak Zheng/LV Peak Tissue Zheng E-Wave; Lateral MA: 6.6  MV Peak Zheng/LV Peak Tissue Zheng E-Wave; Medial MA: 8.7    Prepared and signed by    Mimi Higuera MD [facility]  Signed 13-May-2019 18:30:36         Current Meds:  Current Facility-Administered Medications   Medication Dose Route Frequency    cephALEXin (KEFLEX) capsule 500 mg  500 mg Oral Q6H    guaiFENesin ER (MUCINEX) tablet 1,200 mg  1,200 mg Oral Q12H    benzonatate (TESSALON) capsule 100 mg  100 mg Oral TID PRN    bisacodyl (DULCOLAX) suppository 10 mg  10 mg Rectal DAILY PRN    methadone (DOLOPHINE) tablet 10 mg  10 mg Oral BID    traMADol (ULTRAM) tablet 50 mg  50 mg Oral Q6H PRN    acetaminophen (TYLENOL) tablet 650 mg  650 mg Oral Q6H PRN    promethazine (PHENERGAN) tablet 25 mg  25 mg Oral Q6H PRN    sodium chloride (NS) flush 5-40 mL  5-40 mL IntraVENous Q8H    sodium chloride (NS) flush 5-40 mL  5-40 mL IntraVENous PRN    ondansetron (ZOFRAN) injection 4 mg  4 mg IntraVENous Q4H PRN    alcohol 62% (NOZIN) nasal  1 Ampule  1 Ampule Topical Q12H       Other Studies (last 24 hours):  No results found. Disposition: home with intent to return on 6/3 for continued IV abx therapy    Discharge Medications:   Current Discharge Medication List      START taking these medications    Details   cephALEXin (KEFLEX) 500 mg capsule Take 1 Cap by mouth four (4) times daily for 28 days. Indications: MSSA bacteremia  Qty: 112 Cap, Refills: 0         STOP taking these medications       rivaroxaban (XARELTO) 15 mg (42)- 20 mg (9) DsPk Comments:   Reason for Stopping:               Activity: Activity as tolerated  Diet: Regular Diet  Wound Care: None needed    Follow-up Appointments   Procedures    FOLLOW UP VISIT Appointment in: Other ([address] [place] [address]) Return to hospital on 6/3     Return to hospital on 6/3     Standing Status:   Standing     Number of Occurrences:   1     Order Specific Question:   Appointment in     Answer:    Other (Specify)       Signed By: Viri Gil     June 2, 2019

## 2019-06-02 NOTE — PROGRESS NOTES
SBAR report received from [place], RN. Pt is stable, alert and oriented x4. Calm and cooperative. Respirations even and unlabored, heart sounds regular. Does not appear to be in distress. Does not appear to be in distress, Sitting up in bed and watching TV. Safety measures in place, bed low and locked, call light in reach.

## 2019-06-02 NOTE — PROGRESS NOTES
6/1/2019      Problem: Mobility Impaired (Adult and Pediatric)  Goal: *Acute Goals and Plan of Care (Insert Text)  Description  Goals:  (1.)Ms. Lenka Chavira will move from supine to sit and sit to supine , scoot up and down and roll side to side with INDEPENDENT within 1 treatment day(s). (2.)Ms. Lenka Chavira will transfer from bed to chair and chair to bed with MODIFIED INDEPENDENCE using the least restrictive device within 1 treatment day(s). (3.)Ms. Lenka Chavira will ambulate with modified independent for 25 feet with the least restrictive device within 6 treatment day(s). PHYSICAL THERAPY: Re-evaluation, Discharge and PM 6/1/2019  INPATIENT: PT Visit Days : 2  Payor: Zora Patino / Plan: [facility] % / Product Type: Anila Escalera /       NAME/AGE/GENDER: Thompson Hinojosa is a 26 y.o. female   PRIMARY DIAGNOSIS: Severe sepsis ([facility] [A41.9, R65.20] MSSA bacteremia   MSSA bacteremia    Procedure(s) (LRB):  MRI ANESTHESIA/ THORACIC SPINE WITH AND WITHOUT CONTRAST/ CERVICAL SPINE WITH AND WITHOUT CONTRAST/ LUMBAR WITH AND WITHOUT CONTRAST/ 816 (N/A)  18 Days Post-Op  ICD-10: Treatment Diagnosis:    · Generalized Muscle Weakness (M62.81)  · Difficulty in walking, Not elsewhere classified (R26.2)  · Other abnormalities of gait and mobility (R26.89)   Precaution/Allergies:  Patient has no known allergies. ASSESSMENT:     Ms. Lenka Chavira is a 26year old  female with an admitting diagnosis of Severe sepsis secondary to vertebral osteomyelitis. Her PMH includes IV drug use, UTI, Hx of PE and hypokalemia. She is agreeable only to PT reevaluation today with therapeutic activities. 6/1/19: pt sitting edge of bed upon arrival. . Bed mobility with Mod I and sit to stand. Pt ambulated in the room with a slight limp but safe and modified independent in room. Therapeutic activities are performed for safety for which she is agreeable. She returned to supine in bed with needs in reach.  She is scheduled for hospital discharge as well tomorrow 2019. This section established at most recent assessment   PROBLEM LIST (Impairments causing functional limitations):  1. Decreased Strength  2. Decreased ADL/Functional Activities  3. Decreased Transfer Abilities  4. Decreased Ambulation Ability/Technique  5. Increased Pain  6. Decreased Activity Tolerance   INTERVENTIONS PLANNED: (Benefits and precautions of physical therapy have been discussed with the patient.)  1. Bed Mobility  2. Gait Training  3. Therapeutic Activites  4. Therapeutic Exercise/Strengthening  5. Transfer Training     TREATMENT PLAN: Frequency/Duration: no additional PT at this time. Patient is scheduled for discharge at this time. Rehabilitation Potential For Stated Goals: Good     REHAB RECOMMENDATIONS (at time of discharge pending progress):    Placement:   patient is modified independent and will discharge home from hospital tomorrow 2019. Equipment:    None at this time. HISTORY:   History of Present Injury/Illness (Reason for Referral):  Zena Tirado is a 26 y.o. female with a past medical history of IV drug use who presents to the ER with report of 3 days of feeling chills and body aches all over. She denies any shana fevers, nausea, vomiting, dysuria, cough, congestion. Severe sepsis ([address] (2019) Vertebral Osteomyelitis. Active Problems:    IVDU (intravenous drug user) (2019)     UTI (urinary tract infection) (2019)     History of pulmonary embolism (2019)     Hypokalemia (2019)    Past Medical History/Comorbidities:   Ms. Tracie Klein  has no past medical history on file. Ms. Tracie Klein  has a past surgical history that includes hx gyn; hx tubal ligation (Bilateral); hx  section; and hx orthopaedic (2018).   Social History/Living Environment:   Home Environment: Private residence  # Steps to Enter: 5  One/Two Story Residence: Two story, live on 1st floor  Living Alone: No  Support Systems: Friends \ neighbors  Patient Expects to be Discharged to[de-identified] Private residence  Current DME Used/Available at Home: None  Prior Level of Function/Work/Activity:  Patient reports she lives in a 2 story residence with her friend and her PLOF was independent without an assistive device. Number of Personal Factors/Comorbidities that affect the Plan of Care: 1-2: MODERATE COMPLEXITY   EXAMINATION:   Most Recent Physical Functioning:   Gross Assessment:  AROM: Generally decreased, functional  PROM: Generally decreased, functional  Strength: Generally decreased, functional  Tone: Normal  Sensation: Intact               Posture:     Balance:  Sitting: Intact  Standing: Impaired  Standing - Static: Good  Standing - Dynamic : Fair Bed Mobility:  Rolling: Modified independent  Supine to Sit: Modified independent  Sit to Supine: Modified independent  Scooting: Modified independent  Wheelchair Mobility:     Transfers:  Sit to Stand: Modified independent  Stand to Sit: Modified independent  Bed to Chair: Modified independent  Gait:     Base of Support: Center of gravity altered  Speed/Ada: Fluctuations; Pace decreased (<100 feet/min)  Step Length: Left shortened;Right shortened  Gait Abnormalities: Decreased step clearance  Distance (ft): 25 Feet (ft)(refused to go outside room or any farther)  Assistive Device: (no assistive device)  Ambulation - Level of Assistance: Modified independent  Interventions: Manual cues; Safety awareness training; Tactile cues; Verbal cues; Visual/Demos      Body Structures Involved:  1. Metabolic  2. Endocrine Body Functions Affected:  1. Neuromusculoskeletal  2. Movement Related Activities and Participation Affected:  1. General Tasks and Demands  2. Mobility  3.  Self Care   Number of elements that affect the Plan of Care: 3: MODERATE COMPLEXITY   CLINICAL PRESENTATION:   Presentation: Evolving clinical presentation with changing clinical characteristics: MODERATE COMPLEXITY   CLINICAL DECISION MAKING:   [employer] AM-PAC 6 Clicks   Basic Mobility Inpatient Short Form  How much difficulty does the patient currently have. .. Unable A Lot A Little None   1. Turning over in bed (including adjusting bedclothes, sheets and blankets)? ? 1   ? 2   ? 3   ? 4   2. Sitting down on and standing up from a chair with arms ( e.g., wheelchair, bedside commode, etc.)   ? 1   ? 2   ? 3   ? 4   3. Moving from lying on back to sitting on the side of the bed?   ? 1   ? 2   ? 3   ? 4   How much help from another person does the patient currently need. .. Total A Lot A Little None   4. Moving to and from a bed to a chair (including a wheelchair)? ? 1   ? 2   ? 3   ? 4   5. Need to walk in hospital room? ? 1   ? 2   ? 3   ? 4   6. Climbing 3-5 steps with a railing? ? 1   ? 2   ? 3   ? 4   © 2007, Trustees of [address] [place] [address], under license to [employer]. All rights reserved      Score:  Initial: 18 Most Recent: X (Date: -- )    Interpretation of Tool:  Represents activities that are increasingly more difficult (i.e. Bed mobility, Transfers, Gait). Medical Necessity:   No additional PT indicated at this time. .  Reason for Services/Other Comments:  · No additional PT indicated at this time. Use of outcome tool(s) and clinical judgement create a POC that gives a: Questionable prediction of patient's progress: MODERATE COMPLEXITY            TREATMENT:   (In addition to Assessment/Re-Assessment sessions the following treatments were rendered)   Pre-treatment Symptoms/Complaints:  No complaints of pain  Pain: Initial:   Pain Intensity 1: (no specific pain denoted)  Post Session:  Not rated     Therapeutic Activity: (    8 minutes): Therapeutic activities including Bed transfers and Ambulation on level ground to improve mobility, strength, balance and endurance. Required minimal Manual cues; Safety awareness training; Tactile cues; Verbal cues; Visual/Demos to promote static and dynamic balance in standing with use of the walker. Braces/Orthotics/Lines/Etc:   · IV  · O2 Device: Room air  Treatment/Session Assessment:    · Response to Treatment:  See above. Poor disposition  · Interdisciplinary Collaboration:   o Physical Therapist  o Registered Nurse  · After treatment position/precautions:   o Supine in bed  o Bed in low position  o Call light within reach  o RN notified   · Compliance with Program/Exercises: noncompliant   · Recommendations/Intent for next treatment session:  No additional PT indicated at this time.      Total Treatment Duration:  PT Patient Time In/Time Out  Time In: 1910  Time Out: 3330 June Hoyos, PT

## 2019-06-02 NOTE — DISCHARGE INSTRUCTIONS
Please take your Keflex as prescribed. Please return to the hospital for admission on 6/3 to complete your IV antibiotic treatment. Patient Education        Urinary Tract Infection in Women: Care Instructions  Your Care Instructions    A urinary tract infection, or UTI, is a general term for an infection anywhere between the kidneys and the urethra (where urine comes out). Most UTIs are bladder infections. They often cause pain or burning when you urinate. UTIs are caused by bacteria and can be cured with antibiotics. Be sure to complete your treatment so that the infection goes away. Follow-up care is a key part of your treatment and safety. Be sure to make and go to all appointments, and call your doctor if you are having problems. It's also a good idea to know your test results and keep a list of the medicines you take. How can you care for yourself at home? · Take your antibiotics as directed. Do not stop taking them just because you feel better. You need to take the full course of antibiotics. · Drink extra water and other fluids for the next day or two. This may help wash out the bacteria that are causing the infection. (If you have kidney, heart, or liver disease and have to limit fluids, talk with your doctor before you increase your fluid intake.)  · Avoid drinks that are carbonated or have caffeine. They can irritate the bladder. · Urinate often. Try to empty your bladder each time. · To relieve pain, take a hot bath or lay a heating pad set on low over your lower belly or genital area. Never go to sleep with a heating pad in place. To prevent UTIs  · Drink plenty of water each day. This helps you urinate often, which clears bacteria from your system. (If you have kidney, heart, or liver disease and have to limit fluids, talk with your doctor before you increase your fluid intake.)  · Urinate when you need to. · Urinate right after you have sex. · Change sanitary pads often.   · Avoid douches, bubble baths, feminine hygiene sprays, and other feminine hygiene products that have deodorants. · After going to the bathroom, wipe from front to back. When should you call for help? Call your doctor now or seek immediate medical care if:    · Symptoms such as fever, chills, nausea, or vomiting get worse or appear for the first time.     · You have new pain in your back just below your rib cage. This is called flank pain.     · There is new blood or pus in your urine.     · You have any problems with your antibiotic medicine.    Watch closely for changes in your health, and be sure to contact your doctor if:    · You are not getting better after taking an antibiotic for 2 days.     · Your symptoms go away but then come back. Where can you learn more? Go to http://julita-ariana.info/. Enter J601 in the search box to learn more about \"Urinary Tract Infection in Women: Care Instructions. \"  Current as of: March 20, 2018  Content Version: 11.9  © 2242-0864 [employer]. Care instructions adapted under license by [employer] (which disclaims liability or warranty for this information). If you have questions about a medical condition or this instruction, always ask your healthcare professional. [place] [address] any warranty or liability for your use of this information. Patient Education        Pneumonia: Care Instructions  Your Care Instructions    Pneumonia is an infection of the lungs. Most cases are caused by infections from bacteria or viruses. Pneumonia may be mild or very severe. If it is caused by bacteria, you will be treated with antibiotics. It may take a few weeks to a few months to recover fully from pneumonia, depending on how sick you were and whether your overall health is good. Follow-up care is a key part of your treatment and safety. Be sure to make and go to all appointments, and call your doctor if you are having problems. It's also a good idea to know your test results and keep a list of the medicines you take. How can you care for yourself at home? · Take your antibiotics exactly as directed. Do not stop taking the medicine just because you are feeling better. You need to take the full course of antibiotics. · Take your medicines exactly as prescribed. Call your doctor if you think you are having a problem with your medicine. · Get plenty of rest and sleep. You may feel weak and tired for a while, but your energy level will improve with time. · To prevent dehydration, drink plenty of fluids, enough so that your urine is light yellow or clear like water. Choose water and other caffeine-free clear liquids until you feel better. If you have kidney, heart, or liver disease and have to limit fluids, talk with your doctor before you increase the amount of fluids you drink. · Take care of your cough so you can rest. A cough that brings up mucus from your lungs is common with pneumonia. It is one way your body gets rid of the infection. But if coughing keeps you from resting or causes severe fatigue and chest-wall pain, talk to your doctor. He or she may suggest that you take a medicine to reduce the cough. · Use a vaporizer or humidifier to add moisture to your bedroom. Follow the directions for cleaning the machine. · Do not smoke or allow others to smoke around you. Smoke will make your cough last longer. If you need help quitting, talk to your doctor about stop-smoking programs and medicines. These can increase your chances of quitting for good. · Take an over-the-counter pain medicine, such as acetaminophen (Tylenol), ibuprofen (Advil, Motrin), or naproxen (Aleve). Read and follow all instructions on the label. · Do not take two or more pain medicines at the same time unless the doctor told you to. Many pain medicines have acetaminophen, which is Tylenol. Too much acetaminophen (Tylenol) can be harmful.   · If you were given a spirometer to measure how well your lungs are working, use it as instructed. This can help your doctor tell how your recovery is going. · To prevent pneumonia in the future, talk to your doctor about getting a flu vaccine (once a year) and a pneumococcal vaccine (one time only for most people). When should you call for help? Call 911 anytime you think you may need emergency care. For example, call if:    · You have severe trouble breathing.    Call your doctor now or seek immediate medical care if:    · You cough up dark brown or bloody mucus (sputum).     · You have new or worse trouble breathing.     · You are dizzy or lightheaded, or you feel like you may faint.    Watch closely for changes in your health, and be sure to contact your doctor if:    · You have a new or higher fever.     · You are coughing more deeply or more often.     · You are not getting better after 2 days (48 hours).     · You do not get better as expected. Where can you learn more? Go to http://julita-ariana.info/. Enter [id number] in the search box to learn more about \"Pneumonia: Care Instructions. \"  Current as of: September 5, 2018  Content Version: 11.9  © 1071-7804 [employer]. Care instructions adapted under license by [employer] (which disclaims liability or warranty for this information). If you have questions about a medical condition or this instruction, always ask your healthcare professional. [place] [address] any warranty or liability for your use of this information.          DISCHARGE SUMMARY from Nurse    PATIENT INSTRUCTIONS:    After general anesthesia or intravenous sedation, for 24 hours or while taking prescription Narcotics:  · Limit your activities  · Do not drive and operate hazardous machinery  · Do not make important personal or business decisions  · Do  not drink alcoholic beverages  · If you have not urinated within 8 hours after discharge, please contact your surgeon on call. Report the following to your surgeon:  · Excessive pain, swelling, redness or odor of or around the surgical area  · Temperature over 100.5  · Nausea and vomiting lasting longer than 4 hours or if unable to take medications  · Any signs of decreased circulation or nerve impairment to extremity: change in color, persistent  numbness, tingling, coldness or increase pain  · Any questions    What to do at Home:  Recommended activity: Activity as tolerated,     If you experience any of the following symptoms fever greater then 100.5, pain unrelieved by medication, increase in shortness of breath, please follow up with primary care doctor. *  Please give a list of your current medications to your Primary Care Provider. *  Please update this list whenever your medications are discontinued, doses are      changed, or new medications (including over-the-counter products) are added. *  Please carry medication information at all times in case of emergency situations. These are general instructions for a healthy lifestyle:    No smoking/ No tobacco products/ Avoid exposure to second hand smoke  Surgeon General's Warning:  Quitting smoking now greatly reduces serious risk to your health. Obesity, smoking, and sedentary lifestyle greatly increases your risk for illness    A healthy diet, regular physical exercise & weight monitoring are important for maintaining a healthy lifestyle    You may be retaining fluid if you have a history of heart failure or if you experience any of the following symptoms:  Weight gain of 3 pounds or more overnight or 5 pounds in a week, increased swelling in our hands or feet or shortness of breath while lying flat in bed. Please call your doctor as soon as you notice any of these symptoms; do not wait until your next office visit.     Recognize signs and symptoms of STROKE:    F-face looks uneven    A-arms unable to move or move unevenly    S-speech slurred or non-existent    T-time-call 911 as soon as signs and symptoms begin-DO NOT go       Back to bed or wait to see if you get better-TIME IS BRAIN. Warning Signs of HEART ATTACK     Call 911 if you have these symptoms:   Chest discomfort. Most heart attacks involve discomfort in the center of the chest that lasts more than a few minutes, or that goes away and comes back. It can feel like uncomfortable pressure, squeezing, fullness, or pain.  Discomfort in other areas of the upper body. Symptoms can include pain or discomfort in one or both arms, the back, neck, jaw, or stomach.  Shortness of breath with or without chest discomfort.  Other signs may include breaking out in a cold sweat, nausea, or lightheadedness. Don't wait more than five minutes to call 911 - MINUTES MATTER! Fast action can save your life. Calling 911 is almost always the fastest way to get lifesaving treatment. Emergency Medical Services staff can begin treatment when they arrive -- up to an hour sooner than if someone gets to the hospital by car. The discharge information has been reviewed with the patient. The patient verbalized understanding. Discharge medications reviewed with the patient and appropriate educational materials and side effects teaching were provided.   ___________________________________________________________________________________________________________________________________

## 2019-06-04 ENCOUNTER — HOSPITAL ENCOUNTER (INPATIENT)
Age: 34
LOS: 11 days | Discharge: ANOTHER HOSPITAL | DRG: 871 | End: 2019-06-15
Attending: EMERGENCY MEDICINE | Admitting: HOSPITALIST
Payer: SUBSIDIZED

## 2019-06-04 ENCOUNTER — APPOINTMENT (OUTPATIENT)
Dept: GENERAL RADIOLOGY | Age: 34
DRG: 871 | End: 2019-06-04
Attending: INTERNAL MEDICINE
Payer: SUBSIDIZED

## 2019-06-04 ENCOUNTER — APPOINTMENT (OUTPATIENT)
Dept: GENERAL RADIOLOGY | Age: 34
DRG: 871 | End: 2019-06-04
Attending: EMERGENCY MEDICINE
Payer: SUBSIDIZED

## 2019-06-04 DIAGNOSIS — R41.82 ALTERED MENTAL STATUS, UNSPECIFIED ALTERED MENTAL STATUS TYPE: ICD-10-CM

## 2019-06-04 DIAGNOSIS — A41.9 SEPSIS (HCC): ICD-10-CM

## 2019-06-04 DIAGNOSIS — F19.10 DRUG ABUSE (HCC): Primary | ICD-10-CM

## 2019-06-04 DIAGNOSIS — A41.9 SEPSIS, DUE TO UNSPECIFIED ORGANISM: ICD-10-CM

## 2019-06-04 PROBLEM — D64.9 ANEMIA: Status: ACTIVE | Noted: 2019-06-04

## 2019-06-04 PROBLEM — R00.0 TACHYCARDIA: Status: ACTIVE | Noted: 2019-06-04

## 2019-06-04 LAB
ALBUMIN SERPL-MCNC: 2.4 G/DL (ref 3.5–5)
ALBUMIN/GLOB SERPL: 0.4 {RATIO} (ref 1.2–3.5)
ALP SERPL-CCNC: 89 U/L (ref 50–130)
ALT SERPL-CCNC: 13 U/L (ref 12–65)
AMPHET UR QL SCN: POSITIVE
ANION GAP SERPL CALC-SCNC: 10 MMOL/L (ref 7–16)
APAP SERPL-MCNC: <0 UG/ML (ref 10–30)
AST SERPL-CCNC: 12 U/L (ref 15–37)
ATRIAL RATE: 125 BPM
BACTERIA URNS QL MICRO: 0 /HPF
BARBITURATES UR QL SCN: NEGATIVE
BASOPHILS # BLD: 0 K/UL (ref 0–0.2)
BASOPHILS NFR BLD: 1 % (ref 0–2)
BENZODIAZ UR QL: POSITIVE
BILIRUB SERPL-MCNC: 0.3 MG/DL (ref 0.2–1.1)
BUN SERPL-MCNC: 12 MG/DL (ref 6–23)
CALCIUM SERPL-MCNC: 9 MG/DL (ref 8.3–10.4)
CALCULATED P AXIS, ECG09: 72 DEGREES
CALCULATED R AXIS, ECG10: 74 DEGREES
CALCULATED T AXIS, ECG11: 52 DEGREES
CANNABINOIDS UR QL SCN: NEGATIVE
CASTS URNS QL MICRO: NORMAL /LPF
CHLORIDE SERPL-SCNC: 99 MMOL/L (ref 98–107)
CO2 SERPL-SCNC: 28 MMOL/L (ref 21–32)
COCAINE UR QL SCN: NEGATIVE
CREAT SERPL-MCNC: 0.64 MG/DL (ref 0.6–1)
CRYSTALS URNS QL MICRO: 0 /LPF
DIAGNOSIS, 93000: NORMAL
DIFFERENTIAL METHOD BLD: ABNORMAL
EOSINOPHIL # BLD: 0.2 K/UL (ref 0–0.8)
EOSINOPHIL NFR BLD: 3 % (ref 0.5–7.8)
EPI CELLS #/AREA URNS HPF: NORMAL /HPF
ERYTHROCYTE [DISTWIDTH] IN BLOOD BY AUTOMATED COUNT: 14.3 % (ref 11.9–14.6)
ETHANOL SERPL-MCNC: <3 MG/DL
GLOBULIN SER CALC-MCNC: 5.8 G/DL (ref 2.3–3.5)
GLUCOSE SERPL-MCNC: 109 MG/DL (ref 65–100)
HCG UR QL: NEGATIVE
HCT VFR BLD AUTO: 25 % (ref 35.8–46.3)
HGB BLD-MCNC: 7.8 G/DL (ref 11.7–15.4)
IMM GRANULOCYTES # BLD AUTO: 0 K/UL (ref 0–0.5)
IMM GRANULOCYTES NFR BLD AUTO: 0 % (ref 0–5)
LACTATE BLD-SCNC: 0.72 MMOL/L (ref 0.5–1.9)
LYMPHOCYTES # BLD: 1.9 K/UL (ref 0.5–4.6)
LYMPHOCYTES NFR BLD: 30 % (ref 13–44)
MCH RBC QN AUTO: 26.1 PG (ref 26.1–32.9)
MCHC RBC AUTO-ENTMCNC: 31.2 G/DL (ref 31.4–35)
MCV RBC AUTO: 83.6 FL (ref 79.6–97.8)
METHADONE UR QL: POSITIVE
MONOCYTES # BLD: 0.5 K/UL (ref 0.1–1.3)
MONOCYTES NFR BLD: 9 % (ref 4–12)
MUCOUS THREADS URNS QL MICRO: 0 /LPF
NEUTS SEG # BLD: 3.5 K/UL (ref 1.7–8.2)
NEUTS SEG NFR BLD: 58 % (ref 43–78)
NRBC # BLD: 0 K/UL (ref 0–0.2)
OPIATES UR QL: POSITIVE
OTHER OBSERVATIONS,UCOM: NORMAL
P-R INTERVAL, ECG05: 130 MS
PCP UR QL: NEGATIVE
PLATELET # BLD AUTO: 499 K/UL (ref 150–450)
PMV BLD AUTO: 8.9 FL (ref 9.4–12.3)
POTASSIUM SERPL-SCNC: 3.6 MMOL/L (ref 3.5–5.1)
PROT SERPL-MCNC: 8.2 G/DL (ref 6.3–8.2)
Q-T INTERVAL, ECG07: 308 MS
QRS DURATION, ECG06: 76 MS
QTC CALCULATION (BEZET), ECG08: 444 MS
RBC # BLD AUTO: 2.99 M/UL (ref 4.05–5.2)
RBC #/AREA URNS HPF: NORMAL /HPF
SALICYLATES SERPL-MCNC: <1.7 MG/DL (ref 2.8–20)
SODIUM SERPL-SCNC: 137 MMOL/L (ref 136–145)
VENTRICULAR RATE, ECG03: 125 BPM
WBC # BLD AUTO: 6.1 K/UL (ref 4.3–11.1)
WBC URNS QL MICRO: NORMAL /HPF

## 2019-06-04 PROCEDURE — 87040 BLOOD CULTURE FOR BACTERIA: CPT

## 2019-06-04 PROCEDURE — 74011250636 HC RX REV CODE- 250/636: Performed by: INTERNAL MEDICINE

## 2019-06-04 PROCEDURE — 74011000258 HC RX REV CODE- 258: Performed by: EMERGENCY MEDICINE

## 2019-06-04 PROCEDURE — 87086 URINE CULTURE/COLONY COUNT: CPT

## 2019-06-04 PROCEDURE — 99285 EMERGENCY DEPT VISIT HI MDM: CPT | Performed by: EMERGENCY MEDICINE

## 2019-06-04 PROCEDURE — 96375 TX/PRO/DX INJ NEW DRUG ADDON: CPT | Performed by: EMERGENCY MEDICINE

## 2019-06-04 PROCEDURE — 74011250636 HC RX REV CODE- 250/636: Performed by: EMERGENCY MEDICINE

## 2019-06-04 PROCEDURE — 81003 URINALYSIS AUTO W/O SCOPE: CPT | Performed by: EMERGENCY MEDICINE

## 2019-06-04 PROCEDURE — 85025 COMPLETE CBC W/AUTO DIFF WBC: CPT

## 2019-06-04 PROCEDURE — 96374 THER/PROPH/DIAG INJ IV PUSH: CPT | Performed by: EMERGENCY MEDICINE

## 2019-06-04 PROCEDURE — 74011250636 HC RX REV CODE- 250/636: Performed by: HOSPITALIST

## 2019-06-04 PROCEDURE — 80307 DRUG TEST PRSMV CHEM ANLYZR: CPT

## 2019-06-04 PROCEDURE — 80053 COMPREHEN METABOLIC PANEL: CPT

## 2019-06-04 PROCEDURE — 73030 X-RAY EXAM OF SHOULDER: CPT

## 2019-06-04 PROCEDURE — 74011250637 HC RX REV CODE- 250/637: Performed by: INTERNAL MEDICINE

## 2019-06-04 PROCEDURE — 71045 X-RAY EXAM CHEST 1 VIEW: CPT

## 2019-06-04 PROCEDURE — 93005 ELECTROCARDIOGRAM TRACING: CPT | Performed by: EMERGENCY MEDICINE

## 2019-06-04 PROCEDURE — 83605 ASSAY OF LACTIC ACID: CPT

## 2019-06-04 PROCEDURE — 77030020263 HC SOL INJ SOD CL0.9% LFCR 1000ML

## 2019-06-04 PROCEDURE — 74011250637 HC RX REV CODE- 250/637: Performed by: HOSPITALIST

## 2019-06-04 PROCEDURE — 81015 MICROSCOPIC EXAM OF URINE: CPT

## 2019-06-04 PROCEDURE — 65270000029 HC RM PRIVATE

## 2019-06-04 PROCEDURE — 81025 URINE PREGNANCY TEST: CPT

## 2019-06-04 RX ORDER — ONDANSETRON 2 MG/ML
4 INJECTION INTRAMUSCULAR; INTRAVENOUS
Status: COMPLETED | OUTPATIENT
Start: 2019-06-04 | End: 2019-06-04

## 2019-06-04 RX ORDER — ONDANSETRON 2 MG/ML
4 INJECTION INTRAMUSCULAR; INTRAVENOUS
Status: DISCONTINUED | OUTPATIENT
Start: 2019-06-04 | End: 2019-06-12 | Stop reason: HOSPADM

## 2019-06-04 RX ORDER — METHADONE HYDROCHLORIDE 10 MG/1
10 TABLET ORAL 2 TIMES DAILY
Status: DISCONTINUED | OUTPATIENT
Start: 2019-06-04 | End: 2019-06-12 | Stop reason: HOSPADM

## 2019-06-04 RX ORDER — SODIUM CHLORIDE 0.9 % (FLUSH) 0.9 %
5-40 SYRINGE (ML) INJECTION AS NEEDED
Status: DISCONTINUED | OUTPATIENT
Start: 2019-06-04 | End: 2019-06-11

## 2019-06-04 RX ORDER — SODIUM CHLORIDE 9 MG/ML
125 INJECTION, SOLUTION INTRAVENOUS CONTINUOUS
Status: DISCONTINUED | OUTPATIENT
Start: 2019-06-04 | End: 2019-06-06

## 2019-06-04 RX ORDER — SODIUM CHLORIDE 0.9 % (FLUSH) 0.9 %
5-40 SYRINGE (ML) INJECTION EVERY 8 HOURS
Status: DISCONTINUED | OUTPATIENT
Start: 2019-06-04 | End: 2019-06-11

## 2019-06-04 RX ORDER — CEFAZOLIN SODIUM/WATER 2 G/20 ML
2 SYRINGE (ML) INTRAVENOUS EVERY 6 HOURS
Status: DISCONTINUED | OUTPATIENT
Start: 2019-06-04 | End: 2019-06-10

## 2019-06-04 RX ORDER — ENOXAPARIN SODIUM 100 MG/ML
40 INJECTION SUBCUTANEOUS EVERY 24 HOURS
Status: DISCONTINUED | OUTPATIENT
Start: 2019-06-04 | End: 2019-06-05

## 2019-06-04 RX ORDER — DIPHENHYDRAMINE HCL 25 MG
25 CAPSULE ORAL
Status: DISCONTINUED | OUTPATIENT
Start: 2019-06-04 | End: 2019-06-12 | Stop reason: HOSPADM

## 2019-06-04 RX ORDER — ACETAMINOPHEN 325 MG/1
650 TABLET ORAL
Status: DISCONTINUED | OUTPATIENT
Start: 2019-06-04 | End: 2019-06-12 | Stop reason: HOSPADM

## 2019-06-04 RX ADMIN — Medication 2 G: at 17:32

## 2019-06-04 RX ADMIN — Medication 2 G: at 11:41

## 2019-06-04 RX ADMIN — SODIUM CHLORIDE 125 ML/HR: 900 INJECTION, SOLUTION INTRAVENOUS at 21:23

## 2019-06-04 RX ADMIN — ACETAMINOPHEN 650 MG: 325 TABLET, FILM COATED ORAL at 11:49

## 2019-06-04 RX ADMIN — CEFTRIAXONE 1 G: 1 INJECTION, POWDER, FOR SOLUTION INTRAMUSCULAR; INTRAVENOUS at 03:28

## 2019-06-04 RX ADMIN — Medication 2 G: at 06:00

## 2019-06-04 RX ADMIN — ONDANSETRON 4 MG: 2 INJECTION INTRAMUSCULAR; INTRAVENOUS at 02:38

## 2019-06-04 RX ADMIN — METHADONE HYDROCHLORIDE 10 MG: 10 TABLET ORAL at 17:32

## 2019-06-04 RX ADMIN — SODIUM CHLORIDE 1000 ML: 900 INJECTION, SOLUTION INTRAVENOUS at 02:00

## 2019-06-04 RX ADMIN — SODIUM CHLORIDE 125 ML/HR: 900 INJECTION, SOLUTION INTRAVENOUS at 06:00

## 2019-06-04 RX ADMIN — METHADONE HYDROCHLORIDE 10 MG: 10 TABLET ORAL at 11:41

## 2019-06-04 RX ADMIN — SODIUM CHLORIDE 125 ML/HR: 900 INJECTION, SOLUTION INTRAVENOUS at 15:05

## 2019-06-04 RX ADMIN — ENOXAPARIN SODIUM 40 MG: 40 INJECTION SUBCUTANEOUS at 07:51

## 2019-06-04 RX ADMIN — Medication 5 ML: at 05:47

## 2019-06-04 NOTE — H&P
Hospitalist H&P/Consult Note     Admit Date:  2019 12:42 AM   Name:  Hazel Monte   Age:  26 y.o.  :  1985   MRN:  [id number]   PCP:  Sherman Joya MD  Treatment Team: Attending Provider: Andi Smith MD; Primary Nurse: Luis Muñoz    HPI:   Patient is a 27 y/o female with hx IV drug abuse, polysubstance abuse (heroin, amphetamines, THC, opiates) who was admitted - for MSSA bacteremia. She was to be on IV abx per ID through  but was discharged to go to her daughter's graduation and to return for further treatment. She was given an Rx for keflex. Tonight she was found altered at a gas station with her head in a cooler. She admitted to doing heroin again. Her drug screen is also positive for amphetamines. She is tachycardic to 139. WBC ct and lactate normal. Urine shows sterile pyuria. New cultures were obtained. IV antibiotics were started. Will readmit for possible new onset sepsis. 10 systems reviewed and negative except as noted in HPI. No past medical history on file. Past Surgical History:   Procedure Laterality Date    HX  SECTION      HX GYN      HX ORTHOPAEDIC  2018    left arm     HX TUBAL LIGATION Bilateral       Prior to Admission Medications   Prescriptions Last Dose Informant Patient Reported? Taking? cephALEXin (KEFLEX) 500 mg capsule   No No   Sig: Take 1 Cap by mouth four (4) times daily for 28 days. Indications: MSSA bacteremia      Facility-Administered Medications: None     No Known Allergies   Social History     Tobacco Use    Smoking status: Never Smoker    Smokeless tobacco: Never Used   Substance Use Topics    Alcohol use: No     Frequency: Never      No family history on file.    Immunization History   Administered Date(s) Administered    TB Skin Test (PPD) Intradermal 2019, 2019       Objective:     Patient Vitals for the past 24 hrs:   Temp Pulse Resp BP SpO2   19 0335 98.3 °F (36.8 °C) 99 18 93/54 100 % 06/04/19 0043 100 °F (37.8 °C) (!) 139 18 114/68 98 %     Oxygen Therapy  O2 Sat (%): 100 % (06/04/19 0335)  O2 Device: Room air (06/04/19 0332)    Intake/Output Summary (Last 24 hours) at 6/4/2019 0426  Last data filed at 6/4/2019 0345  Gross per 24 hour   Intake 1050 ml   Output    Net 1050 ml       Physical Exam:  General:    Well nourished. lethargic  Eyes:   Normal sclera. Extraocular movements intact. ENT:  Normocephalic, atraumatic. Moist mucous membranes  CV:   tachycardic. No murmur, rub, or gallop. Lungs:  CTAB. No wheezing, rhonchi, or rales. Abdomen: Soft, nontender, nondistended. Bowel sounds normal.   Extremities: Warm and dry. No cyanosis or edema. Neurologic: CN II-XII grossly intact. Sensation intact. Skin:     No rashes or jaundice. No wounds. Psych:  Normal mood and confused affect. I reviewed the labs, imaging, EKGs, telemetry, and other studies done this admission.   Data Review:   Recent Results (from the past 24 hour(s))   HCG URINE, QL. - POC    Collection Time: 06/04/19  1:39 AM   Result Value Ref Range    Pregnancy test,urine (POC) NEGATIVE  NEG     DRUG SCREEN, URINE    Collection Time: 06/04/19  1:42 AM   Result Value Ref Range    PCP(PHENCYCLIDINE) NEGATIVE       BENZODIAZEPINES POSITIVE      COCAINE NEGATIVE       AMPHETAMINES POSITIVE      METHADONE POSITIVE      THC (TH-CANNABINOL) NEGATIVE       OPIATES POSITIVE      BARBITURATES NEGATIVE      URINE MICROSCOPIC    Collection Time: 06/04/19  1:42 AM   Result Value Ref Range    WBC  0 /hpf    RBC 0-3 0 /hpf    Epithelial cells 5-10 0 /hpf    Bacteria 0 0 /hpf    Casts 20-50 0 /lpf    Crystals, urine 0 0 /LPF    Mucus 0 0 /lpf    Other observations RESULTS VERIFIED MANUALLY     CULTURE, BLOOD    Collection Time: 06/04/19  2:18 AM   Result Value Ref Range    Special Requests: LEFT FOREARM      Culture result: PENDING    CBC WITH AUTOMATED DIFF    Collection Time: 06/04/19  2:32 AM   Result Value Ref Range WBC 6.1 4.3 - 11.1 K/uL    RBC 2.99 (L) 4.05 - 5.2 M/uL    HGB 7.8 (L) 11.7 - 15.4 g/dL    HCT 25.0 (L) 35.8 - 46.3 %    MCV 83.6 79.6 - 97.8 FL    MCH 26.1 26.1 - 32.9 PG    MCHC 31.2 (L) 31.4 - 35.0 g/dL    RDW 14.3 11.9 - 14.6 %    PLATELET 765 (H) 889 - 450 K/uL    MPV 8.9 (L) 9.4 - 12.3 FL    ABSOLUTE NRBC 0.00 0.0 - 0.2 K/uL    DF AUTOMATED      NEUTROPHILS 58 43 - 78 %    LYMPHOCYTES 30 13 - 44 %    MONOCYTES 9 4.0 - 12.0 %    EOSINOPHILS 3 0.5 - 7.8 %    BASOPHILS 1 0.0 - 2.0 %    IMMATURE GRANULOCYTES 0 0.0 - 5.0 %    ABS. NEUTROPHILS 3.5 1.7 - 8.2 K/UL    ABS. LYMPHOCYTES 1.9 0.5 - 4.6 K/UL    ABS. MONOCYTES 0.5 0.1 - 1.3 K/UL    ABS. EOSINOPHILS 0.2 0.0 - 0.8 K/UL    ABS. BASOPHILS 0.0 0.0 - 0.2 K/UL    ABS. IMM. GRANS. 0.0 0.0 - 0.5 K/UL   METABOLIC PANEL, COMPREHENSIVE    Collection Time: 06/04/19  2:32 AM   Result Value Ref Range    Sodium 137 136 - 145 mmol/L    Potassium 3.6 3.5 - 5.1 mmol/L    Chloride 99 98 - 107 mmol/L    CO2 28 21 - 32 mmol/L    Anion gap 10 7 - 16 mmol/L    Glucose 109 (H) 65 - 100 mg/dL    BUN 12 6 - 23 MG/DL    Creatinine 0.64 0.6 - 1.0 MG/DL    GFR est AA >60 >60 ml/min/1.73m2    GFR est non-AA >60 >60 ml/min/1.73m2    Calcium 9.0 8.3 - 10.4 MG/DL    Bilirubin, total 0.3 0.2 - 1.1 MG/DL    ALT (SGPT) 13 12 - 65 U/L    AST (SGOT) 12 (L) 15 - 37 U/L    Alk.  phosphatase 89 50 - 130 U/L    Protein, total 8.2 6.3 - 8.2 g/dL    Albumin 2.4 (L) 3.5 - 5.0 g/dL    Globulin 5.8 (H) 2.3 - 3.5 g/dL    A-G Ratio 0.4 (L) 1.2 - 3.5     ETHYL ALCOHOL    Collection Time: 06/04/19  2:32 AM   Result Value Ref Range    ALCOHOL(ETHYL),SERUM <3 MG/DL   SALICYLATE    Collection Time: 06/04/19  2:32 AM   Result Value Ref Range    Salicylate level <0.7 (L) 2.8 - 20.0 MG/DL   ACETAMINOPHEN    Collection Time: 06/04/19  2:32 AM   Result Value Ref Range    Acetaminophen level <0 (LL) 10.0 - 30 ug/mL   POC LACTIC ACID    Collection Time: 06/04/19  2:54 AM   Result Value Ref Range    Lactic Acid (POC) 0. 72 0.5 - 1.9 mmol/L       Imaging Studies:  CXR Results  (Last 48 hours)               06/04/19 0128  XR CHEST PORT Final result    Impression:  IMPRESSION: Normal chest.       Narrative:  EXAM: XR CHEST PORT       INDICATION: sepsis       COMPARISON: 5/13/2019       FINDINGS: A portable AP radiograph of the chest was obtained at 0123 hours. The   patient is on a cardiac monitor. The lungs are clear. The cardiac and   mediastinal contours and pulmonary vascularity are normal.  The bones and soft   tissues are grossly within normal limits.                 CT Results  (Last 48 hours)    None          Assessment and Plan:     Hospital Problems as of 6/4/2019 Never Reviewed          Codes Class Noted - Resolved POA    * (Principal) Sepsis ([facility] ICD-10-CM: A41.9  ICD-9-CM: 038.9, 995.91  6/4/2019 - Present Yes        Drug abuse ([facility] ICD-10-CM: F19.10  ICD-9-CM: 305.90  6/4/2019 - Present Yes        Anemia ICD-10-CM: D64.9  ICD-9-CM: 285.9  6/4/2019 - Present Yes        Altered mental status ICD-10-CM: R41.82  ICD-9-CM: 780.97  6/4/2019 - Present Yes        Tachycardia ICD-10-CM: R00.0  ICD-9-CM: 785.0  6/4/2019 - Present Yes        IVDU (intravenous drug user) ICD-10-CM: F19.90  ICD-9-CM: 305.90  2/1/2019 - Present Yes              PLAN:  · Admit to medical bed  · Resume IV ancef for recent MSSA bacteremia  · Per ID patient to be on IV abx through 6/24  · May need to resume methadone for heroin withdrawal  · IV fluids for now  · Bedrest until more alert  · No other narcotics to be given in hospital  · SQ lovenox for DVT prophylaxis      Estimated LOS:  Greater than 2 midnights    Signed:  Robinson Staley MD

## 2019-06-04 NOTE — ED PROVIDER NOTES
She is a 33-year-old female who was brought to the emergency department today via EMS from a local convenience store. She was found with her head in a cooler. He is not a very good history. She does admit to abusing heroin. She was just discharged from the hospital yesterday after a prolonged admission for sepsis. The history is provided by the patient and the EMS personnel. Other          No past medical history on file. Past Surgical History:   Procedure Laterality Date    HX  SECTION      HX GYN      HX ORTHOPAEDIC  2018    left arm     HX TUBAL LIGATION Bilateral          No family history on file.     Social History     Socioeconomic History    Marital status: SINGLE     Spouse name: Not on file    Number of children: Not on file    Years of education: Not on file    Highest education level: Not on file   Occupational History    Not on file   Social Needs    Financial resource strain: Not on file    Food insecurity:     Worry: Not on file     Inability: Not on file    Transportation needs:     Medical: Not on file     Non-medical: Not on file   Tobacco Use    Smoking status: Never Smoker    Smokeless tobacco: Never Used   Substance and Sexual Activity    Alcohol use: No     Frequency: Never    Drug use: Yes     Types: Marijuana, Methamphetamines, Heroin, Opiates     Comment: daily use    Sexual activity: Not on file   Lifestyle    Physical activity:     Days per week: Not on file     Minutes per session: Not on file    Stress: Not on file   Relationships    Social connections:     Talks on phone: Not on file     Gets together: Not on file     Attends Judaism service: Not on file     Active member of club or organization: Not on file     Attends meetings of clubs or organizations: Not on file     Relationship status: Not on file    Intimate partner violence:     Fear of current or ex partner: Not on file     Emotionally abused: Not on file     Physically abused: Not on file     Forced sexual activity: Not on file   Other Topics Concern    Not on file   Social History Narrative    Not on file         ALLERGIES: Patient has no known allergies. Review of Systems   Unable to perform ROS: Mental status change       Vitals:    06/04/19 0043   BP: 114/68   Pulse: (!) 139   Resp: 18   Temp: 100 °F (37.8 °C)   SpO2: 98%   Weight: 65.8 kg (145 lb)   Height: 5' 3\" (1.6 m)            Physical Exam   Constitutional: She appears well-developed and well-nourished. Slightly confused, repeatedly falling asleep. HENT:   Head: Normocephalic and atraumatic. Eyes: Pupils are equal, round, and reactive to light. Conjunctivae and EOM are normal.   Neck: Normal range of motion. Neck supple. Cardiovascular: Regular rhythm and intact distal pulses. Tachycardia present. Pulmonary/Chest: Effort normal and breath sounds normal.   Abdominal: Soft. There is no tenderness. There is no rebound and no guarding. Musculoskeletal: Normal range of motion. She exhibits no edema or deformity. Lymphadenopathy:     She has no cervical adenopathy. Neurological: She is alert. She has normal strength. She is disoriented. No cranial nerve deficit or sensory deficit. GCS eye subscore is 4. GCS verbal subscore is 5. GCS motor subscore is 6. Skin: Skin is warm and dry. No rash noted. Nursing note and vitals reviewed. MDM  Number of Diagnoses or Management Options  Diagnosis management comments: Records were reviewed and she does have a history of drug abuse. She had an admission for almost a month with sepsis, bacteremia, septic emboli in the lungs, questionable septic arthritis.     3:33 AM  EKG reviewed by me shows sinus tachycardia rate of 125 with no acute ischemia  Portable chest x-ray negative  Urinalysis has significant white blood cells  Patient is altered and confused but this may be related to her substance abuse urine drug screen is positive for opiates again she does admit to using heroin. With temperature 100 and the tachycardia sepsis workup was initiated. Left take acid came back negative. Blood cultures were obtained. Urine was also cultured. I ordered Rocephin for possible UTI. Blood pressure did drop as low as 80 while asleep. I consulted with Dr. Prosper Dumont of the hospitalist service for admission and further management. Voice dictation software was used during the making of this note. This software is not perfect and grammatical and other typographical errors may be present. This note has been proofread, but may still contain errors.   Venancio Gillespie MD; 6/4/2019 @3:34 AM   ===================================================================         Amount and/or Complexity of Data Reviewed  Clinical lab tests: ordered and reviewed  Tests in the radiology section of CPT®: ordered and reviewed  Review and summarize past medical records: yes  Discuss the patient with other providers: yes  Independent visualization of images, tracings, or specimens: yes    Risk of Complications, Morbidity, and/or Mortality  Presenting problems: moderate  Diagnostic procedures: moderate  Management options: moderate    Patient Progress  Patient progress: stable         Procedures

## 2019-06-04 NOTE — ED NOTES
TRANSFER - OUT REPORT:    Verbal [facility]   [facility] to [facility] for routine progression of care       Report consisted of patients Situation, Background, Assessment and   Recommendations(SBAR). Information from the following report(s) SBAR, Kardex, ED Summary, Intake/Output and MAR was reviewed with the receiving nurse. Lines:   Peripheral IV 06/04/19 Left; Inner Antecubital (Active)        Opportunity for questions and clarification was provided.       Patient transported with:   Registered Nurse

## 2019-06-04 NOTE — ED TRIAGE NOTES
Pt presents to the ED via EMS,  EMS reports police called EMS because pt was found at a gas station with her head in the cooler,  Pt admits to heroin,  Denies any other drugs tonight.   Otherwise no complaints

## 2019-06-04 NOTE — PROGRESS NOTES
Interdisciplinary team rounds were held 6/4/2019 with the following team members:Care Management, Nursing, Nutrition, Pharmacy, Physical Therapy and Physician . Plan of care discussed. See clinical pathway and/or care plan for interventions and desired outcomes.

## 2019-06-04 NOTE — PROGRESS NOTES
TRANSFER - IN REPORT:    Verbal report received from PRABHA Barone(name) on Curtis Leak  being received from ED(unit) for routine progression of care      Report consisted of patients Situation, Background, Assessment and   Recommendations(SBAR). Information from the following report(s) SBAR, ED Summary, [place] and Recent Results was reviewed with the receiving nurse. Opportunity for questions and clarification was provided. Assessment completed upon patients arrival to unit and care assumed.

## 2019-06-04 NOTE — PROGRESS NOTES
06/04/19 0400   Peripheral Vascular   Peripheral Vascular (WDL) WDL   Dual Skin Pressure Injury Assessment   Dual Skin Pressure Injury Assessment WDL   Second Care Provider (Based on Facility Policy) Robbie Castro RN   Skin Integumentary   Skin Integumentary (WDL) X    Pressure  Injury Documentation No Pressure Injury Noted-Pressure Ulcer Prevention Initiated   Skin Color Pale   Skin Condition/Temp Dry; Warm   Skin Integrity Intact; Piercing(s); Tattoos (comment)   Turgor Non-tenting   Hair Growth Present   Varicosities Absent   Wound Prevention and Protection Methods   Orientation of Wound Prevention Posterior   Location of Wound Prevention Sacrum/Coccyx   Dressing Present  No   Wound Offloading (Prevention Methods) Bed, pressure reduction mattress   Skin intact with tatoos and piercing. Right groin site where blood was drawn from. Dressing intact.

## 2019-06-04 NOTE — PROGRESS NOTES
Assessment done via doc flow sheet. Pt resting quietly in bed, easily awakens when name is called. Drowsy, oriented to self, disoriented to place and time. Calm and cooperative, complained of back and lef pains, repositioned in bed comfortable. Bed low & locked, bed alarm set and call light within reach, pt instructed to call for assistance as needed.

## 2019-06-04 NOTE — PROGRESS NOTES
Progress Note    Patient: Breana Bright MRN: [id number]  SSN: [id number]    YOB: 1985  Age: 26 y.o. Sex: female      Admit Date: 6/4/2019    LOS: 0 days     Subjective:   Patient examined at bedside. She stated having body aches, her right shoulder and back. She requested methadone for pain. Objective:     Vitals:    06/04/19 0043 06/04/19 0335   BP: 114/68 93/54   Pulse: (!) 139 99   Resp: 18 18   Temp: 100 °F (37.8 °C) 98.3 °F (36.8 °C)   SpO2: 98% 100%   Weight: 65.8 kg (145 lb)    Height: 5' 3\" (1.6 m)         Intake and Output:  Current Shift: No intake/output data recorded. Last three shifts: 06/02 1901 - 06/04 0700  In: 1050 [I.V.:1050]  Out: -     Physical Exam:   GENERAL: alert, in distress due to pain   EYE: negative  LYMPHATIC: Cervical, supraclavicular, and axillary nodes normal.   THROAT & NECK: normal and no erythema or exudates noted. LUNG: clear to auscultation bilaterally  HEART: regular rate and rhythm, S1, S2 normal, no murmur, click, rub or gallop  ABDOMEN: soft, non-tender. Bowel sounds normal. No masses,  no organomegaly  EXTREMITIES: no edema over her right shoulder   SKIN: Normal.  NEUROLOGIC: negative  PSYCHIATRIC: non focal    Lab/Data Review: All lab results for the last 24 hours reviewed. Assessment:     Principal Problem:    Sepsis ([address] (6/4/2019)    Active Problems:    IVDU (intravenous drug user) (2/1/2019)      Drug abuse ([facility] (6/4/2019)      Anemia (6/4/2019)      Altered mental status (6/4/2019)      Tachycardia (6/4/2019)        Plan:     · Continue IV ancef for recent MSSA bacteremia  · Per ID patient to be on IV abx through 6/24  · Start methadone. She is an active heroin user   · IV fluids for now  · Her right shoulder XR was normal, possible OA   · No other narcotics to be given in hospital  · SQ lovenox for DVT prophylaxis    Code status: full    Disposition: she will stay to complete IV antibiotics until 6/24. The patient is uninsured. NO BILL CHARGE FOR THIS NOTE.  SAME DAY ADMISSION     Signed By: Norene Mcardle, MD     June 4, 2019

## 2019-06-04 NOTE — PROGRESS NOTES
Care Management Interventions  Transition of Care Consult (CM Consult): Discharge Planning  Discharge Location  Discharge Placement: Home       Sunday Stall with David Austin will see pt on Wed. Chart reviewed. Pt was discharged from [facility] on 6-2 at pt's request to attend a family graduation. Pt is a known IV drug user ( heroin). Pt was suppose to remain hospitalized till 6-24 for IV ABX. ADIN spoke with pt who is agreeable to speaking with a [employer] rep to get treatment. [facility] [place] and left VM message.

## 2019-06-05 LAB
ANION GAP SERPL CALC-SCNC: 6 MMOL/L (ref 7–16)
BUN SERPL-MCNC: 5 MG/DL (ref 6–23)
CALCIUM SERPL-MCNC: 8.3 MG/DL (ref 8.3–10.4)
CHLORIDE SERPL-SCNC: 106 MMOL/L (ref 98–107)
CO2 SERPL-SCNC: 26 MMOL/L (ref 21–32)
CREAT SERPL-MCNC: 0.57 MG/DL (ref 0.6–1)
ERYTHROCYTE [DISTWIDTH] IN BLOOD BY AUTOMATED COUNT: 14.5 % (ref 11.9–14.6)
FERRITIN SERPL-MCNC: 132 NG/ML (ref 8–388)
GLUCOSE SERPL-MCNC: 104 MG/DL (ref 65–100)
HCT VFR BLD AUTO: 21.7 % (ref 35.8–46.3)
HGB BLD-MCNC: 6.5 G/DL (ref 11.7–15.4)
HGB BLD-MCNC: 9.2 G/DL (ref 11.7–15.4)
IRON SATN MFR SERPL: 11 %
IRON SERPL-MCNC: 22 UG/DL
MCH RBC QN AUTO: 26.1 PG (ref 26.1–32.9)
MCHC RBC AUTO-ENTMCNC: 30 G/DL (ref 31.4–35)
MCV RBC AUTO: 87.1 FL (ref 79.6–97.8)
NRBC # BLD: 0 K/UL (ref 0–0.2)
PLATELET # BLD AUTO: 334 K/UL (ref 150–450)
PMV BLD AUTO: 8.7 FL (ref 9.4–12.3)
POTASSIUM SERPL-SCNC: 3.6 MMOL/L (ref 3.5–5.1)
RBC # BLD AUTO: 2.49 M/UL (ref 4.05–5.2)
SODIUM SERPL-SCNC: 138 MMOL/L (ref 136–145)
TIBC SERPL-MCNC: 198 UG/DL (ref 250–450)
WBC # BLD AUTO: 4.8 K/UL (ref 4.3–11.1)

## 2019-06-05 PROCEDURE — 74011250636 HC RX REV CODE- 250/636: Performed by: HOSPITALIST

## 2019-06-05 PROCEDURE — 77030020263 HC SOL INJ SOD CL0.9% LFCR 1000ML

## 2019-06-05 PROCEDURE — 74011250637 HC RX REV CODE- 250/637: Performed by: INTERNAL MEDICINE

## 2019-06-05 PROCEDURE — 74011250636 HC RX REV CODE- 250/636: Performed by: INTERNAL MEDICINE

## 2019-06-05 PROCEDURE — 83540 ASSAY OF IRON: CPT

## 2019-06-05 PROCEDURE — 85018 HEMOGLOBIN: CPT

## 2019-06-05 PROCEDURE — 85027 COMPLETE CBC AUTOMATED: CPT

## 2019-06-05 PROCEDURE — 86900 BLOOD TYPING SEROLOGIC ABO: CPT

## 2019-06-05 PROCEDURE — 74011250637 HC RX REV CODE- 250/637: Performed by: HOSPITALIST

## 2019-06-05 PROCEDURE — 77030039270 HC TU BLD FLTR CARD -A

## 2019-06-05 PROCEDURE — 82728 ASSAY OF FERRITIN: CPT

## 2019-06-05 PROCEDURE — 36430 TRANSFUSION BLD/BLD COMPNT: CPT

## 2019-06-05 PROCEDURE — 30233N1 TRANSFUSION OF NONAUTOLOGOUS RED BLOOD CELLS INTO PERIPHERAL VEIN, PERCUTANEOUS APPROACH: ICD-10-PCS | Performed by: HOSPITALIST

## 2019-06-05 PROCEDURE — 80048 BASIC METABOLIC PNL TOTAL CA: CPT

## 2019-06-05 PROCEDURE — 65270000029 HC RM PRIVATE

## 2019-06-05 PROCEDURE — 36415 COLL VENOUS BLD VENIPUNCTURE: CPT

## 2019-06-05 PROCEDURE — 86923 COMPATIBILITY TEST ELECTRIC: CPT

## 2019-06-05 PROCEDURE — P9016 RBC LEUKOCYTES REDUCED: HCPCS

## 2019-06-05 RX ORDER — SODIUM CHLORIDE 9 MG/ML
250 INJECTION, SOLUTION INTRAVENOUS AS NEEDED
Status: DISCONTINUED | OUTPATIENT
Start: 2019-06-05 | End: 2019-06-12 | Stop reason: HOSPADM

## 2019-06-05 RX ADMIN — METHADONE HYDROCHLORIDE 10 MG: 10 TABLET ORAL at 17:11

## 2019-06-05 RX ADMIN — Medication 10 ML: at 13:11

## 2019-06-05 RX ADMIN — Medication 10 ML: at 22:22

## 2019-06-05 RX ADMIN — ACETAMINOPHEN 650 MG: 325 TABLET, FILM COATED ORAL at 08:50

## 2019-06-05 RX ADMIN — Medication 2 G: at 00:26

## 2019-06-05 RX ADMIN — SODIUM CHLORIDE 125 ML/HR: 900 INJECTION, SOLUTION INTRAVENOUS at 22:22

## 2019-06-05 RX ADMIN — METHADONE HYDROCHLORIDE 10 MG: 10 TABLET ORAL at 08:50

## 2019-06-05 RX ADMIN — Medication 2 G: at 17:11

## 2019-06-05 RX ADMIN — Medication 2 G: at 13:11

## 2019-06-05 RX ADMIN — Medication 10 ML: at 05:53

## 2019-06-05 RX ADMIN — SODIUM CHLORIDE 125 ML/HR: 900 INJECTION, SOLUTION INTRAVENOUS at 05:50

## 2019-06-05 RX ADMIN — Medication 2 G: at 05:50

## 2019-06-05 NOTE — PROGRESS NOTES
Progress Note    Patient: Patti Bethea MRN: [id number]  SSN: [id number]    YOB: 1985  Age: 26 y.o. Sex: female      Admit Date: 6/4/2019    LOS: 1 day     Subjective:   Feels better, denies nausea, vomiting, or pain. Objective:     Vitals:    06/04/19 2055 06/04/19 2314 06/05/19 0332 06/05/19 0416   BP: 100/60 94/60 (!) 88/57 90/52   Pulse: 94 100 96 (!) 104   Resp: 18 17 18    Temp: 98.9 °F (37.2 °C) 99.3 °F (37.4 °C) 99.1 °F (37.3 °C)    SpO2: 98% 100% 97% 96%   Weight:       Height:            Intake and Output:  Current Shift: No intake/output data recorded. Last three shifts: 06/03 1901 - 06/05 0700  In: 3811 [I.V.:3811]  Out: 625 [Urine:625]    Physical Exam:   GENERAL: alert  EYE: negative  LYMPHATIC: Cervical, supraclavicular, and axillary nodes normal.   THROAT & NECK: normal and no erythema or exudates noted. LUNG: clear to auscultation bilaterally  HEART: regular rate and rhythm, S1, S2 normal, no murmur, click, rub or gallop  ABDOMEN: soft, non-tender. Bowel sounds normal. No masses,  no organomegaly  EXTREMITIES: no edema over her right shoulder . No edema,  No cyanosis   SKIN: Normal.  NEUROLOGIC: negative  PSYCHIATRIC: non focal    Lab/Data Review: All lab results for the last 24 hours reviewed. Assessment:     Principal Problem:    Sepsis ([address] (6/4/2019)    Active Problems:    IVDU (intravenous drug user) (2/1/2019)      Drug abuse ([address] (6/4/2019)      Anemia (6/4/2019)      Altered mental status (6/4/2019)      Tachycardia (6/4/2019)        Plan:     · Continue IV ancef for recent MSSA bacteremia  · Per ID patient to be on IV abx through 6/24  · On methadone while in house for uncontrolled pain. She is an active heroin user   · Her altered mental status, was secondary to encephalopathy related to drugs, it is now resolved   · She had an anemia of 6.5 gr today, plan to transfuse 2 PRBC   · Check anemia work up , FOBT.  If her iron stores are low, will give iv iron DVT prophylaxis: discontinue lovenox due to anemia, and start GCS instead     Code status: full    Disposition: she will stay to complete IV antibiotics until 6/24. The patient is uninsured.      Signed By: Fernando Ludwig MD     June 5, 2019

## 2019-06-05 NOTE — PROGRESS NOTES
Shift assessment complete. Pt resting quietly in bed. Drowsy but oriented x4. Resp even and unlabored. Pt has no complaints. Call light within reach. Pt instructed to call for assistance.

## 2019-06-05 NOTE — PROGRESS NOTES
Nutrition  Reason for assessment: Referral received from nursing admission Malnutrition Screening Tool   Recently Lost Weight Without Trying: Unsure     Eating Poorly Due to Decreased Appetite: Yes  Assessment:   Diet: DIET REGULAR    Food/Nutrition Patient History:  Pt presented to ED via EMS from a convenience store with altered mental status; admitted with sepsis. Past medical history notable for IV Drug Abuse, polysubstance abuse (heroin, amphetamines, THC, opiates). Pt was recently discharged from the [facility] (5/09-6/02) r/t MSSA bacteremia. Pt lives with a roommate and endorses poor po intake at home; generally eats dry cereal when she is hungry; minimal food preparation. Pt thinks she weighed ~ 165#  6 months ago. Anthropometrics:Height: 5' 3\" (160 cm),  Weight: 65.8 kg (145 lb), Weight source not specified, Body mass index is 25.69 kg/m². BMI class of overweight. Weight history per EMR:  Unable to verify if weights are actual vs stated due to functionality of [facility]. WT / BMI WEIGHT BODY MASS INDEX   6/4/2019 145 lb 25.69 kg/m2   5/30/2019 150 lb 6.4 oz 26.64 kg/m2   3/30/2019 165 lb 29.23 kg/m2   2/1/2019  28.34 kg/m2   1/31/2019 160 lb    Pt meets ASPEN CRITERIA FOR MALNUTRITION: moderate malnutrition social/environmental-weight loss of 9% past 6 months and eating < 75% estimated needs past 3 months. Macronutrient needs:  EER:  4779-5015 kcal /day (25-30 kcal/kg BW)  EPR:  79-98 grams protein/day (1.2-1.5 grams/kg BW)  Intake/Comparative Standards: Per RD meal rounds: pt ate 90% small pizza, did not eat anything else at lunch. No recorded intake. Pt endorses she ate a few bites of biscuit and gravy and oatmeal at breakfast this am.  Based on minimal data, pt potentially meets 45-50% of estimated kcal needs and 25-35% estimated protein needs.   Nutrition Diagnosis: Inadequate oral intake r/t decreased ability to consume sufficient oral intake as evidenced by estimates of insufficient intake of energy or high quality protein from diet when compared to requirements. Intervention:  Meals and snacks: Continue current diet. Nutrition Supplement Therapy: Initiated Ensure Enlive on all meal trays. Ordered weight   Discharge nutrition plan: Too soon to determine.   Coordination of Nutrition Care:  Interdisciplinary Rounds    Ashleigh Heard, [address] [place] [address], [address] [place] [address]  [phone]

## 2019-06-05 NOTE — CDMP QUERY
Pt admitted with sepsis./Pt noted to have AMS on admission. If possible, please document in the progress notes and d/c summary if you are evaluating and / or treating any of the following:       Metabolic Encephalopathy   Septic Encephalopathy   Toxic Encephalopathy   Encephalopathy due to medications or drugs (please specify)   Toxic Metabolic Encephalopathy   Other Encephalopathy   Other, please specify   Clinically unable to determine    The medical record reflects the following:     Risk Factors: age, IV drug abuse, polysubstance abuse, readmission for sepsis     Clinical Indicators: per Dr. Evelio Bowen H&P written on 6/4 @ 9457 \"Tonight she was found altered at a gas station with her head in a cooler. \", per PRABHA Schafer progress note on 6/4 @ [phone] \"Drowsy, oriented to self, disoriented to place and time. \"      Treatment: NS 125mls/hr, Ancef, Methadone 10mg BID, and bedrest until more alert    Thanks,  JEROMY Gupta, RN, CDS  Compliant Documentation Management Program  [phone]

## 2019-06-05 NOTE — PROGRESS NOTES
Shift assessment completed. See flow sheet for details. Pt is alert, oriented but drowsy. NAD noted. Bed in low and locked position. Bed alarm on. Pt is instructed to call for assistance. Call light within reach.

## 2019-06-05 NOTE — PROGRESS NOTES
Interdisciplinary team rounds were held 6/5/2019 with the following team members:Care Management, Nursing, Nutrition, Pharmacy, Physical Therapy and Physician. Plan of care discussed. See clinical pathway and/or care plan for interventions and desired outcomes.

## 2019-06-05 NOTE — PROGRESS NOTES
Spiritual Care Visit. Initial visit. Patient was visited by [facility]. Entered by Marco Antonio Boss, Staff .  Adina, Sonia

## 2019-06-06 LAB
ABO + RH BLD: NORMAL
BACTERIA SPEC CULT: NORMAL
BASOPHILS # BLD: 0 K/UL (ref 0–0.2)
BASOPHILS NFR BLD: 1 % (ref 0–2)
BLD PROD TYP BPU: NORMAL
BLD PROD TYP BPU: NORMAL
BLOOD GROUP ANTIBODIES SERPL: NORMAL
BPU ID: NORMAL
BPU ID: NORMAL
CROSSMATCH RESULT,%XM: NORMAL
CROSSMATCH RESULT,%XM: NORMAL
DIFFERENTIAL METHOD BLD: ABNORMAL
EOSINOPHIL # BLD: 0.2 K/UL (ref 0–0.8)
EOSINOPHIL NFR BLD: 4 % (ref 0.5–7.8)
ERYTHROCYTE [DISTWIDTH] IN BLOOD BY AUTOMATED COUNT: 14.6 % (ref 11.9–14.6)
HCT VFR BLD AUTO: 28.8 % (ref 35.8–46.3)
HGB BLD-MCNC: 9 G/DL (ref 11.7–15.4)
IMM GRANULOCYTES # BLD AUTO: 0 K/UL (ref 0–0.5)
IMM GRANULOCYTES NFR BLD AUTO: 0 % (ref 0–5)
LYMPHOCYTES # BLD: 1.6 K/UL (ref 0.5–4.6)
LYMPHOCYTES NFR BLD: 30 % (ref 13–44)
MCH RBC QN AUTO: 27.2 PG (ref 26.1–32.9)
MCHC RBC AUTO-ENTMCNC: 31.3 G/DL (ref 31.4–35)
MCV RBC AUTO: 87 FL (ref 79.6–97.8)
MONOCYTES # BLD: 0.6 K/UL (ref 0.1–1.3)
MONOCYTES NFR BLD: 12 % (ref 4–12)
NEUTS SEG # BLD: 2.9 K/UL (ref 1.7–8.2)
NEUTS SEG NFR BLD: 53 % (ref 43–78)
NRBC # BLD: 0 K/UL (ref 0–0.2)
PLATELET # BLD AUTO: 296 K/UL (ref 150–450)
PMV BLD AUTO: 9.1 FL (ref 9.4–12.3)
RBC # BLD AUTO: 3.31 M/UL (ref 4.05–5.2)
SERVICE CMNT-IMP: NORMAL
SPECIMEN EXP DATE BLD: NORMAL
STATUS OF UNIT,%ST: NORMAL
STATUS OF UNIT,%ST: NORMAL
UNIT DIVISION, %UDIV: 0
UNIT DIVISION, %UDIV: 0
WBC # BLD AUTO: 5.4 K/UL (ref 4.3–11.1)

## 2019-06-06 PROCEDURE — 74011250637 HC RX REV CODE- 250/637: Performed by: INTERNAL MEDICINE

## 2019-06-06 PROCEDURE — 65270000029 HC RM PRIVATE

## 2019-06-06 PROCEDURE — 36415 COLL VENOUS BLD VENIPUNCTURE: CPT

## 2019-06-06 PROCEDURE — 77030020263 HC SOL INJ SOD CL0.9% LFCR 1000ML

## 2019-06-06 PROCEDURE — 74011250636 HC RX REV CODE- 250/636: Performed by: INTERNAL MEDICINE

## 2019-06-06 PROCEDURE — 74011250636 HC RX REV CODE- 250/636: Performed by: HOSPITALIST

## 2019-06-06 PROCEDURE — 85025 COMPLETE CBC W/AUTO DIFF WBC: CPT

## 2019-06-06 RX ADMIN — Medication 2 G: at 17:47

## 2019-06-06 RX ADMIN — METHADONE HYDROCHLORIDE 10 MG: 10 TABLET ORAL at 17:47

## 2019-06-06 RX ADMIN — Medication 2 G: at 00:20

## 2019-06-06 RX ADMIN — Medication 2 G: at 12:01

## 2019-06-06 RX ADMIN — SODIUM CHLORIDE 125 ML/HR: 900 INJECTION, SOLUTION INTRAVENOUS at 12:06

## 2019-06-06 RX ADMIN — Medication 2 G: at 06:10

## 2019-06-06 RX ADMIN — SODIUM CHLORIDE 125 ML/HR: 900 INJECTION, SOLUTION INTRAVENOUS at 06:11

## 2019-06-06 RX ADMIN — METHADONE HYDROCHLORIDE 10 MG: 10 TABLET ORAL at 09:00

## 2019-06-06 RX ADMIN — Medication 10 ML: at 06:10

## 2019-06-06 NOTE — PROGRESS NOTES
Interdisciplinary team rounds were held 6/6/2019 with the following team members:Care Management, Nursing, Nutrition, Pharmacy, Physical Therapy and Physician. Plan of care discussed. See clinical pathway and/or care plan for interventions and desired outcomes.

## 2019-06-06 NOTE — PROGRESS NOTES
Am assessment completed. Pt is alert and oriented x 4, lungs clear, breathing non-labored. Pt verbalizes needs well. Continue to monitor.

## 2019-06-06 NOTE — PROGRESS NOTES
Progress Note    Patient: Deane Bosworth MRN: [id number]  SSN: [id number]    YOB: 1985  Age: 26 y.o. Sex: female      Admit Date: 6/4/2019    LOS: 2 days     Subjective:   She was found walking inside her room. Complains of bilateral shoulder pain. Objective:     Vitals:    06/05/19 1713 06/05/19 2014 06/05/19 2327 06/06/19 0337   BP: 101/70 107/65 92/66 105/68   Pulse: 83 90 83 80   Resp: 16 18 18 18   Temp: 98.2 °F (36.8 °C) 98.8 °F (37.1 °C) 98.8 °F (37.1 °C) 99.4 °F (37.4 °C)   SpO2: 94% 95% 99% 94%   Weight:       Height:            Intake and Output:  Current Shift: No intake/output data recorded. Last three shifts: 06/04 1901 - 06/06 0700  In: 6005.6 [I.V.:5174]  Out: 725 [Urine:725]    Physical Exam:   GENERAL: alert  EYE: negative  LYMPHATIC: Cervical, supraclavicular, and axillary nodes normal.   THROAT & NECK: normal and no erythema or exudates noted. LUNG: clear to auscultation bilaterally  HEART: regular rate and rhythm, S1, S2 normal, no murmur, click, rub or gallop  ABDOMEN: soft, non-tender. Bowel sounds normal. No masses,  no organomegaly  EXTREMITIES: no edema over her right shoulder . No edema,  No cyanosis   SKIN: Normal.  NEUROLOGIC: negative  PSYCHIATRIC: non focal    Lab/Data Review: All lab results for the last 24 hours reviewed. Assessment:     Principal Problem:    Sepsis ([address] (6/4/2019)    Active Problems:    IVDU (intravenous drug user) (2/1/2019)      Drug abuse ([address] (6/4/2019)      Anemia (6/4/2019)      Altered mental status (6/4/2019)      Tachycardia (6/4/2019)        Plan:     · Continue IV ancef for recent MSSA bacteremia   · Per ID patient to be on IV abx through 6/24  · On methadone while in house for uncontrolled pain. She is an active heroin user   · Her altered mental status, was secondary to encephalopathy related to drugs, it is now resolved   · She has CORAZNO, status post 2 PRBC.  Her Hb is 9gr - will transfuse iv iron while in house · Moderate malnutrition: started on supplements- counseled - monitor     DVT prophylaxis: GCS     Code status: full    Disposition: she will stay to complete IV antibiotics until 6/24. The patient is uninsured.      Signed By: Elizabeth Serna MD     June 6, 2019

## 2019-06-06 NOTE — PROGRESS NOTES
Angelo met Hafsa ([employer] rep) last night after he met with pt. Prabhjot Camp provided support/listening/resources through a long discussion with pt. He strongly encouraged pt to consider long term treatment at [facility] in [place], [place]. He informed that pt is a part of the [place] [facility] and that they would pay for this treatment. He also informed they do not provide/continue Methadone so this would require a taper prior to discharge. [facility] and Prabhjot Camp felt like this could be paid by the [facility] as well. Hafsa plans to cont to see/ pt about her addiction throughout the long length of stay till 6/24.  Angelo cont to follow and assist.   Ashley Bojorquez

## 2019-06-06 NOTE — CDMP QUERY
Pt admitted with sepsis and has malnutrition documented. Please further specify type of malnutrition. ? Malnutrition (mild, moderate, severe) ? Protein calorie malnutrition (mild, moderate, severe) ? Other (please specify) ? Unable to determine The medical record reflects the following: 
  Risk Factors: age, IV drug use, polysubstance abuse and recent hospital stay for MSSA bacteremia Clinical Indicators: Pt meets ASPEN CRITERIA FOR MALNUTRITION: moderate malnutrition social/environmental-weight loss of 9% past 6 months and eating < 75% estimated needs past 3 months. Macronutrient needs: EER:  1161-3716 kcal /day (25-30 kcal/kg BW) EPR:  79-98 grams protein/day (1.2-1.5 grams/kg BW) Anthropometrics:Height: 5' 3\" (160 cm),  Weight: 65.8 kg (145 lb), Weight source not specified, Body mass index is 25.69 kg/m². BMI class of overweight. Weight history per EMR:  Unable to verify if weights are actual vs stated due to functionality of [facility]. WT / BMI WEIGHT BODY MASS INDEX  
6/4/2019 145 lb 25.69 kg/m2 5/30/2019 150 lb 6.4 oz 26.64 kg/m2 3/30/2019 165 lb 29.23 kg/m2 2/1/2019   28.34 kg/m2 1/31/2019 160 lb    
 
  Treatment: RD consult and initiation of Ensure Enlive on all meal trays Jarred Son BSN, RN, CDS Compliant Documentation Management Program 
[phone]

## 2019-06-06 NOTE — PROGRESS NOTES
Problem: Patient Education: Go to Patient Education Activity  Goal: Patient/Family Education  Outcome: Progressing Towards Goal     Problem: Sepsis: Day of Diagnosis  Goal: Off Pathway (Use only if patient is Off Pathway)  Outcome: Progressing Towards Goal     Problem: Sepsis: Day of Diagnosis  Goal: *Fluid resuscitation  Outcome: Progressing Towards Goal     Problem: Sepsis: Day of Diagnosis  Goal: *Paired blood cultures prior to first dose of antibiotic  Outcome: Progressing Towards Goal     Problem: Sepsis: Day of Diagnosis  Goal: *Lactic acid with first set of blood cultures  Outcome: Progressing Towards Goal     Problem: Sepsis: Day of Diagnosis  Goal: *Pneumococcal immunization (if eligible)  Outcome: Progressing Towards Goal     Problem: Sepsis: Day of Diagnosis  Goal: *Influenza immunization (if eligible)  Outcome: Progressing Towards Goal

## 2019-06-06 NOTE — PROGRESS NOTES
Shift assessment complete. A&OX4. Lungs clear on auscultation. Respirations present. Even and unlabored. No s/s of distress. Zero c/o pain at this time. Call light within reach. Encouraged patient to call for assistance. Patient verbalizes understanding. See Doc Flowsheet for assessment details. Patient resting in bed. Will continue to monitor.

## 2019-06-07 LAB
HCT VFR BLD AUTO: 29.9 % (ref 35.8–46.3)
HGB BLD-MCNC: 9.7 G/DL (ref 11.7–15.4)

## 2019-06-07 PROCEDURE — 74011250636 HC RX REV CODE- 250/636: Performed by: INTERNAL MEDICINE

## 2019-06-07 PROCEDURE — 36415 COLL VENOUS BLD VENIPUNCTURE: CPT

## 2019-06-07 PROCEDURE — 65270000029 HC RM PRIVATE

## 2019-06-07 PROCEDURE — 85018 HEMOGLOBIN: CPT

## 2019-06-07 PROCEDURE — 74011250637 HC RX REV CODE- 250/637: Performed by: HOSPITALIST

## 2019-06-07 PROCEDURE — 74011250637 HC RX REV CODE- 250/637: Performed by: INTERNAL MEDICINE

## 2019-06-07 PROCEDURE — C1751 CATH, INF, PER/CENT/MIDLINE: HCPCS

## 2019-06-07 PROCEDURE — 76937 US GUIDE VASCULAR ACCESS: CPT

## 2019-06-07 PROCEDURE — 74011250636 HC RX REV CODE- 250/636: Performed by: HOSPITALIST

## 2019-06-07 RX ORDER — SODIUM CHLORIDE 0.9 % (FLUSH) 0.9 %
10 SYRINGE (ML) INJECTION EVERY 8 HOURS
Status: DISCONTINUED | OUTPATIENT
Start: 2019-06-07 | End: 2019-06-11

## 2019-06-07 RX ORDER — SODIUM CHLORIDE 0.9 % (FLUSH) 0.9 %
10 SYRINGE (ML) INJECTION AS NEEDED
Status: DISCONTINUED | OUTPATIENT
Start: 2019-06-07 | End: 2019-06-11

## 2019-06-07 RX ADMIN — Medication 10 ML: at 22:00

## 2019-06-07 RX ADMIN — Medication 2 G: at 19:35

## 2019-06-07 RX ADMIN — ONDANSETRON 4 MG: 2 INJECTION INTRAMUSCULAR; INTRAVENOUS at 19:33

## 2019-06-07 RX ADMIN — Medication 10 ML: at 14:32

## 2019-06-07 RX ADMIN — ACETAMINOPHEN 650 MG: 325 TABLET, FILM COATED ORAL at 19:33

## 2019-06-07 RX ADMIN — ONDANSETRON 4 MG: 2 INJECTION INTRAMUSCULAR; INTRAVENOUS at 14:32

## 2019-06-07 RX ADMIN — METHADONE HYDROCHLORIDE 10 MG: 10 TABLET ORAL at 17:27

## 2019-06-07 RX ADMIN — Medication 10 ML: at 05:59

## 2019-06-07 RX ADMIN — SODIUM CHLORIDE 250 MG: 900 INJECTION, SOLUTION INTRAVENOUS at 14:58

## 2019-06-07 RX ADMIN — METHADONE HYDROCHLORIDE 10 MG: 10 TABLET ORAL at 09:39

## 2019-06-07 RX ADMIN — Medication 2 G: at 14:33

## 2019-06-07 NOTE — PROGRESS NOTES
Progress Note    Patient: Jovi Wilson MRN: [id number]  SSN: [id number]    YOB: 1985  Age: 26 y.o. Sex: female      Admit Date: 6/4/2019    LOS: 3 days     Subjective:   Feels better. Objective:     Vitals:    06/06/19 2333 06/07/19 0342 06/07/19 0410 06/07/19 0808   BP: (!) 108/33 (!) 67/48 94/62 111/71   Pulse: 88 91  80   Resp: 17 18  18   Temp: 99.2 °F (37.3 °C) 98 °F (36.7 °C)  98.8 °F (37.1 °C)   SpO2: 93% 100%  97%   Weight:       Height:            Intake and Output:  Current Shift: No intake/output data recorded. Last three shifts: 06/05 1901 - 06/07 0700  In: 2413 [I.V.:2413]  Out: 100 [Urine:100]    Physical Exam:   GENERAL: alert  EYE: negative  LYMPHATIC: Cervical, supraclavicular, and axillary nodes normal.   THROAT & NECK: normal and no erythema or exudates noted. LUNG: clear to auscultation bilaterally  HEART: regular rate and rhythm, S1, S2 normal, no murmur, click, rub or gallop  ABDOMEN: soft, non-tender. Bowel sounds normal. No masses,  no organomegaly  EXTREMITIES: no edema over her right shoulder . No edema,  No cyanosis   SKIN: Normal.  NEUROLOGIC: negative  PSYCHIATRIC: non focal    Lab/Data Review: All lab results for the last 24 hours reviewed. Assessment:     Principal Problem:    Sepsis ([address] (6/4/2019)    Active Problems:    IVDU (intravenous drug user) (2/1/2019)      Drug abuse ([address] (6/4/2019)      Anemia (6/4/2019)      Altered mental status (6/4/2019)      Tachycardia (6/4/2019)        Plan:     · Continue IV ancef for recent MSSA bacteremia -  · For Mid line today   · Per ID patient to be on IV abx through 6/24  · On methadone while in house for uncontrolled pain. She is an active heroin user   · Her altered mental status, was secondary to encephalopathy related to drugs, it is now resolved   · She has CORAZON, status post 2 PRBC.  Transfuse iv iron while in house   · Moderate malnutrition: started on supplements- counseled - monitor   · Sitter-    DVT prophylaxis: GCS     Code status: full    Disposition: she will stay to complete IV antibiotics until 6/24. The patient is uninsured.      Signed By: Rafa Vallecillo MD     June 7, 2019

## 2019-06-07 NOTE — PROGRESS NOTES
MIDLINE Placement Note    PRE-PROCEDURE VERIFICATION  PROCEDURE DETAIL  Time out completed with elizabet lazcano rn and everyone in agreement with procedure. A single lumen Midline was started for vascular access.  The following documentation is in addition to the Midline properties in the lines/airways flowsheet :  Lot #: [id number]  Xylocaine used: y  Mid-Arm Circumference: 28 (cm)  Internal Catheter Length: 15 (cm)  Internal Catheter Total Length: 15 (cm)  Vein Selection for Midline:right basilic            Line is okay to use:

## 2019-06-07 NOTE — PROGRESS NOTES
Problem: Patient Education: Go to Patient Education Activity  Goal: Patient/Family Education  Outcome: Progressing Towards Goal     Problem: Sepsis: Day of Diagnosis  Goal: Off Pathway (Use only if patient is Off Pathway)  Outcome: Progressing Towards Goal     Problem: Sepsis: Day of Diagnosis  Goal: *Fluid resuscitation  Outcome: Progressing Towards Goal     Problem: Sepsis: Day of Diagnosis  Goal: *Paired blood cultures prior to first dose of antibiotic  Outcome: Progressing Towards Goal     Problem: Sepsis: Day of Diagnosis  Goal: *First dose of  appropriate antibiotic within 3 hours of arrival to ED, within 1 hour of arrival to ICU  Outcome: Progressing Towards Goal     Problem: Sepsis: Day of Diagnosis  Goal: *Lactic acid with first set of blood cultures  Outcome: Progressing Towards Goal     Problem: Sepsis: Day of Diagnosis  Goal: *Pneumococcal immunization (if eligible)  Outcome: Progressing Towards Goal     Problem: Sepsis: Day of Diagnosis  Goal: *Influenza immunization (if eligible)  Outcome: Progressing Towards Goal     Problem: Sepsis: Day of Diagnosis  Goal: Activity/Safety  Outcome: Progressing Towards Goal     Problem: Sepsis: Day 2  Goal: Consults, if ordered  Outcome: Progressing Towards Goal     Problem: Sepsis: Day 2  Goal: Diagnostic Test/Procedures  Outcome: Progressing Towards Goal     Problem: Sepsis: Day 2  Goal: Nutrition/Diet  Outcome: Progressing Towards Goal     Problem: Sepsis: Day 2  Goal: Discharge Planning  Outcome: Progressing Towards Goal     Problem: Sepsis: Day 2  Goal: Medications  Outcome: Progressing Towards Goal     Problem: Sepsis: Day 2  Goal: Medications  Outcome: Progressing Towards Goal     Problem: Sepsis: Day 2  Goal: Treatments/Interventions/Procedures  Outcome: Progressing Towards Goal     Problem: Sepsis: Day 2  Goal: Psychosocial  Outcome: Progressing Towards Goal

## 2019-06-07 NOTE — PROGRESS NOTES
Interdisciplinary team rounds were held 6/7/2019 with the following team members:Care Management, Nursing, Nutrition, Pharmacy, Physical Therapy and Physician. Plan of care discussed. See clinical pathway and/or care plan for interventions and desired outcomes.

## 2019-06-07 NOTE — PROGRESS NOTES
Am assessment completed. Pt is alert and oriented x 4, lungs diminished. Verbalizes needs well. Up in room. No complaints of pain. Continue to monitor.

## 2019-06-08 PROCEDURE — 74011250637 HC RX REV CODE- 250/637: Performed by: INTERNAL MEDICINE

## 2019-06-08 PROCEDURE — 74011250636 HC RX REV CODE- 250/636: Performed by: INTERNAL MEDICINE

## 2019-06-08 PROCEDURE — 65270000029 HC RM PRIVATE

## 2019-06-08 RX ORDER — BISACODYL 5 MG
10 TABLET, DELAYED RELEASE (ENTERIC COATED) ORAL
Status: COMPLETED | OUTPATIENT
Start: 2019-06-08 | End: 2019-06-08

## 2019-06-08 RX ORDER — AMOXICILLIN 250 MG
1 CAPSULE ORAL DAILY
Status: DISCONTINUED | OUTPATIENT
Start: 2019-06-08 | End: 2019-06-12 | Stop reason: HOSPADM

## 2019-06-08 RX ADMIN — Medication 2 G: at 02:00

## 2019-06-08 RX ADMIN — Medication 2 G: at 20:27

## 2019-06-08 RX ADMIN — Medication 10 ML: at 22:47

## 2019-06-08 RX ADMIN — METHADONE HYDROCHLORIDE 10 MG: 10 TABLET ORAL at 17:34

## 2019-06-08 RX ADMIN — SENNOSIDES AND DOCUSATE SODIUM 1 TABLET: 8.6; 5 TABLET ORAL at 15:17

## 2019-06-08 RX ADMIN — SODIUM CHLORIDE 250 MG: 900 INJECTION, SOLUTION INTRAVENOUS at 09:29

## 2019-06-08 RX ADMIN — METHADONE HYDROCHLORIDE 10 MG: 10 TABLET ORAL at 09:30

## 2019-06-08 RX ADMIN — BISACODYL 10 MG: 5 TABLET, COATED ORAL at 15:17

## 2019-06-08 RX ADMIN — Medication 2 G: at 15:18

## 2019-06-08 RX ADMIN — Medication 10 ML: at 05:46

## 2019-06-08 RX ADMIN — Medication 2 G: at 09:29

## 2019-06-08 NOTE — PROGRESS NOTES
Shift assessment done. Pt AAO x4. Respirations even and unlabored. HR regular. Abdomen soft with active bowel sounds. Sitter at bedside. Bed low and locked. Call light in reach. Will continue to monitor.

## 2019-06-08 NOTE — PROGRESS NOTES
Problem: Patient Education: Go to Patient Education Activity  Goal: Patient/Family Education  Outcome: Progressing Towards Goal     Problem: Sepsis: Day 3  Goal: *Oxygen saturation within defined limits  Outcome: Progressing Towards Goal  Goal: *Vital sign stability  Outcome: Progressing Towards Goal  Goal: *Tolerating diet  Outcome: Progressing Towards Goal  Goal: Activity/Safety  Outcome: Progressing Towards Goal  Goal: Nutrition/Diet  Outcome: Progressing Towards Goal     Problem: Sepsis: Day 3  Goal: *Vital sign stability  Outcome: Progressing Towards Goal     Problem: Sepsis: Day 3  Goal: *Vital sign stability  Outcome: Progressing Towards Goal

## 2019-06-08 NOTE — PROGRESS NOTES
Progress Note    Patient: Bebo Bryant MRN: [id number]  SSN: [id number]    YOB: 1985  Age: 26 y.o. Sex: female      Admit Date: 6/4/2019    LOS: 4 days     Subjective:   Complains of constipation  . Objective:     Vitals:    06/07/19 2001 06/07/19 2346 06/08/19 0325 06/08/19 0814   BP: 98/69 97/64 109/78 115/78   Pulse: 78 84 79 79   Resp: 18 18 17 16   Temp: 98.2 °F (36.8 °C) 98.2 °F (36.8 °C) 97.6 °F (36.4 °C) 98.2 °F (36.8 °C)   SpO2: 94% 96% 99% 96%   Weight:       Height:            Intake and Output:  Current Shift: No intake/output data recorded. Last three shifts: 06/06 1901 - 06/08 0700  In: -   Out: 100 [Urine:100]    Physical Exam:   GENERAL: alert  EYE: negative  LYMPHATIC: Cervical, supraclavicular, and axillary nodes normal.   THROAT & NECK: normal and no erythema or exudates noted. LUNG: clear to auscultation bilaterally  HEART: regular rate and rhythm, S1, S2 normal, no murmur, click, rub or gallop  ABDOMEN: soft, non-tender. Bowel sounds normal. No masses,  no organomegaly  EXTREMITIES: no edema over her right shoulder . No edema,  No cyanosis   SKIN: Normal.  NEUROLOGIC: negative  PSYCHIATRIC: non focal    Lab/Data Review: All lab results for the last 24 hours reviewed. Assessment:     Principal Problem:    Sepsis ([address] (6/4/2019)    Active Problems:    IVDU (intravenous drug user) (2/1/2019)      Drug abuse ([address] (6/4/2019)      Anemia (6/4/2019)      Altered mental status (6/4/2019)      Tachycardia (6/4/2019)        Plan:     · Continue IV ancef for recent MSSA bacteremia via Mid line    · Per ID patient to be on IV abx through 6/24  · On methadone while in house for uncontrolled pain. She is an active heroin user   · Her altered mental status, was secondary to encephalopathy related to drugs, it is now resolved   · She has CORAZON, status post 2 PRBC.  Transfuse iv iron while in house   · Moderate malnutrition: on supplements- counseled - monitor   · Constipation: start senna and dulcolax    DVT prophylaxis: GCS     Code status: full    Disposition: she will stay to complete IV antibiotics until 6/24. The patient is uninsured.      Signed By: Alba Segundo MD     June 8, 2019

## 2019-06-08 NOTE — PROGRESS NOTES
Am assessment completed. Pt is alert and oriented x 4. Lungs clear, breathing non-labored. Bowel sounds + q 4 continent of bowel and bladder. Verbalizes needs well. Denies pain. Continue to monitor.

## 2019-06-08 NOTE — PROGRESS NOTES
Pt was in the bathroom for almost 15 mins per sitter. Checked pt in the bathroom and got mad to this nurse. She states \"I'm ok! Why do you keep on checking me? \" Seen pt in front the bathroom's mirror putting lipstick. This nurse told the sitter just to keep an eye on the patient. Will continue to monitor.

## 2019-06-09 ENCOUNTER — APPOINTMENT (OUTPATIENT)
Dept: ULTRASOUND IMAGING | Age: 34
DRG: 871 | End: 2019-06-09
Attending: INTERNAL MEDICINE
Payer: SUBSIDIZED

## 2019-06-09 PROBLEM — M46.35: Status: ACTIVE | Noted: 2019-06-09

## 2019-06-09 PROCEDURE — 65270000029 HC RM PRIVATE

## 2019-06-09 PROCEDURE — 74011250636 HC RX REV CODE- 250/636: Performed by: HOSPITALIST

## 2019-06-09 PROCEDURE — 74011250637 HC RX REV CODE- 250/637: Performed by: INTERNAL MEDICINE

## 2019-06-09 PROCEDURE — 93970 EXTREMITY STUDY: CPT

## 2019-06-09 PROCEDURE — 74011250636 HC RX REV CODE- 250/636: Performed by: INTERNAL MEDICINE

## 2019-06-09 RX ADMIN — Medication 10 ML: at 05:17

## 2019-06-09 RX ADMIN — Medication 2 G: at 01:58

## 2019-06-09 RX ADMIN — Medication 2 G: at 09:24

## 2019-06-09 RX ADMIN — Medication 10 ML: at 23:34

## 2019-06-09 RX ADMIN — ONDANSETRON 4 MG: 2 INJECTION INTRAMUSCULAR; INTRAVENOUS at 10:52

## 2019-06-09 RX ADMIN — Medication 2 G: at 13:53

## 2019-06-09 RX ADMIN — Medication 2 G: at 20:44

## 2019-06-09 RX ADMIN — SENNOSIDES AND DOCUSATE SODIUM 1 TABLET: 8.6; 5 TABLET ORAL at 09:24

## 2019-06-09 RX ADMIN — METHADONE HYDROCHLORIDE 10 MG: 10 TABLET ORAL at 17:52

## 2019-06-09 RX ADMIN — METHADONE HYDROCHLORIDE 10 MG: 10 TABLET ORAL at 09:24

## 2019-06-09 RX ADMIN — SODIUM CHLORIDE 250 MG: 900 INJECTION, SOLUTION INTRAVENOUS at 10:45

## 2019-06-09 RX ADMIN — Medication 10 ML: at 23:33

## 2019-06-09 NOTE — PROGRESS NOTES
Progress Note    Patient: Mary Ohara MRN: [id number]  SSN: [id number]    YOB: 1985  Age: 26 y.o. Sex: female      Admit Date: 6/4/2019    LOS: 5 days     Subjective:   She complains of right leg pain and edema today. Denies falls. Denies sob, chest pain. .   Objective:     Vitals:    06/08/19 2009 06/08/19 2335 06/09/19 0339 06/09/19 0824   BP: 99/70 121/84 117/77 100/72   Pulse: 85 96 91 95   Resp: 17 18 18 15   Temp: 98.9 °F (37.2 °C) 98.1 °F (36.7 °C) 98.1 °F (36.7 °C) 98 °F (36.7 °C)   SpO2: 97% 100% 98% 98%   Weight:       Height:            Intake and Output:  Current Shift: No intake/output data recorded. Last three shifts: No intake/output data recorded. Physical Exam:   GENERAL: alert  EYE: negative  LYMPHATIC: Cervical, supraclavicular, and axillary nodes normal.   THROAT & NECK: normal and no erythema or exudates noted. LUNG: clear to auscultation bilaterally  HEART: regular rate and rhythm, S1, S2 normal, no murmur, click, rub or gallop  ABDOMEN: soft, non-tender. Bowel sounds normal. No masses,  no organomegaly  EXTREMITIES: right leg edema up to the distal thigh ; mild tense to touch, mild tenderness, no redness R > L   SKIN: Normal.  NEUROLOGIC: negative  PSYCHIATRIC: non focal    Lab/Data Review: All lab results for the last 24 hours reviewed. Assessment:     Principal Problem:    Sepsis ([address] (6/4/2019)    Active Problems:    IVDU (intravenous drug user) (2/1/2019)      Drug abuse ([address] (6/4/2019)      Anemia (6/4/2019)      Altered mental status (6/4/2019)      Tachycardia (6/4/2019)        Plan:     · Continue IV ancef for recent MSSA bacteremia via Mid line    · Per ID patient to be on IV abx through 6/24  · Right leg pain, concern for DVT - check duplex and start xarelto if positive - she had a PE in 2018, which was treated   · On methadone while in house for uncontrolled pain.  She is an active heroin user   · Her altered mental status, was secondary to encephalopathy related to drugs, it is now resolved   · History of L1 OM, on iv ancef   · She has CORAZON, status post 2 PRBC. Transfuse iv iron while in house   · Moderate malnutrition: on supplements- counseled - monitor   · Constipation: start senna and dulcolax    DVT prophylaxis: GCS     Code status: full    Disposition: she will stay to complete IV antibiotics until 6/24. The patient is uninsured.      Signed By: Carlie Moran MD     June 9, 2019

## 2019-06-09 NOTE — PROGRESS NOTES
Shift assessment done. Pt AAO x4. Respirations even and unlabored. HR regular. Denies needs and pain this time. Safety measures provided. Will continue to monitor.

## 2019-06-09 NOTE — PROGRESS NOTES
Assessment completed and documented. Lung sounds diminished. Heart sounds regular. BLE edema noted. Abdomen soft. Bowel sounds active. Currently up in chair eating breakfast. Will continue to monitor with hourly rounding.

## 2019-06-10 ENCOUNTER — APPOINTMENT (OUTPATIENT)
Dept: ULTRASOUND IMAGING | Age: 34
DRG: 871 | End: 2019-06-10
Attending: INTERNAL MEDICINE
Payer: SUBSIDIZED

## 2019-06-10 ENCOUNTER — APPOINTMENT (OUTPATIENT)
Dept: MRI IMAGING | Age: 34
DRG: 871 | End: 2019-06-10
Attending: INTERNAL MEDICINE
Payer: SUBSIDIZED

## 2019-06-10 LAB
BASOPHILS # BLD: 0 K/UL (ref 0–0.2)
BASOPHILS NFR BLD: 1 % (ref 0–2)
DIFFERENTIAL METHOD BLD: ABNORMAL
EOSINOPHIL # BLD: 0.1 K/UL (ref 0–0.8)
EOSINOPHIL NFR BLD: 1 % (ref 0.5–7.8)
ERYTHROCYTE [DISTWIDTH] IN BLOOD BY AUTOMATED COUNT: 14.7 % (ref 11.9–14.6)
HCT VFR BLD AUTO: 31.1 % (ref 35.8–46.3)
HGB BLD-MCNC: 9.9 G/DL (ref 11.7–15.4)
IMM GRANULOCYTES # BLD AUTO: 0 K/UL (ref 0–0.5)
IMM GRANULOCYTES NFR BLD AUTO: 1 % (ref 0–5)
LYMPHOCYTES # BLD: 1.9 K/UL (ref 0.5–4.6)
LYMPHOCYTES NFR BLD: 30 % (ref 13–44)
MCH RBC QN AUTO: 27.3 PG (ref 26.1–32.9)
MCHC RBC AUTO-ENTMCNC: 31.8 G/DL (ref 31.4–35)
MCV RBC AUTO: 85.9 FL (ref 79.6–97.8)
MONOCYTES # BLD: 0.7 K/UL (ref 0.1–1.3)
MONOCYTES NFR BLD: 10 % (ref 4–12)
NEUTS SEG # BLD: 3.8 K/UL (ref 1.7–8.2)
NEUTS SEG NFR BLD: 58 % (ref 43–78)
NRBC # BLD: 0 K/UL (ref 0–0.2)
PLATELET # BLD AUTO: 343 K/UL (ref 150–450)
PMV BLD AUTO: 9.3 FL (ref 9.4–12.3)
RBC # BLD AUTO: 3.62 M/UL (ref 4.05–5.2)
WBC # BLD AUTO: 6.6 K/UL (ref 4.3–11.1)

## 2019-06-10 PROCEDURE — 74011250637 HC RX REV CODE- 250/637: Performed by: HOSPITALIST

## 2019-06-10 PROCEDURE — 65270000029 HC RM PRIVATE

## 2019-06-10 PROCEDURE — 77030012890

## 2019-06-10 PROCEDURE — 74011250636 HC RX REV CODE- 250/636: Performed by: INTERNAL MEDICINE

## 2019-06-10 PROCEDURE — 73221 MRI JOINT UPR EXTREM W/O DYE: CPT

## 2019-06-10 PROCEDURE — 85025 COMPLETE CBC W/AUTO DIFF WBC: CPT

## 2019-06-10 PROCEDURE — 93971 EXTREMITY STUDY: CPT

## 2019-06-10 PROCEDURE — 74011250637 HC RX REV CODE- 250/637: Performed by: INTERNAL MEDICINE

## 2019-06-10 RX ORDER — CEPHALEXIN 500 MG/1
500 CAPSULE ORAL EVERY 6 HOURS
Status: DISCONTINUED | OUTPATIENT
Start: 2019-06-10 | End: 2019-06-12 | Stop reason: ALTCHOICE

## 2019-06-10 RX ORDER — POLYETHYLENE GLYCOL 3350 17 G/17G
17 POWDER, FOR SOLUTION ORAL DAILY
Status: DISCONTINUED | OUTPATIENT
Start: 2019-06-10 | End: 2019-06-12 | Stop reason: HOSPADM

## 2019-06-10 RX ORDER — ENOXAPARIN SODIUM 100 MG/ML
1 INJECTION SUBCUTANEOUS EVERY 12 HOURS
Status: DISCONTINUED | OUTPATIENT
Start: 2019-06-10 | End: 2019-06-12 | Stop reason: HOSPADM

## 2019-06-10 RX ORDER — MIRTAZAPINE 15 MG/1
7.5 TABLET, FILM COATED ORAL
Status: DISCONTINUED | OUTPATIENT
Start: 2019-06-10 | End: 2019-06-12 | Stop reason: HOSPADM

## 2019-06-10 RX ORDER — GUAIFENESIN 100 MG/5ML
100 SOLUTION ORAL
Status: DISCONTINUED | OUTPATIENT
Start: 2019-06-10 | End: 2019-06-12 | Stop reason: HOSPADM

## 2019-06-10 RX ADMIN — ACETAMINOPHEN 650 MG: 325 TABLET, FILM COATED ORAL at 23:08

## 2019-06-10 RX ADMIN — CEPHALEXIN 500 MG: 500 CAPSULE ORAL at 18:06

## 2019-06-10 RX ADMIN — Medication 10 ML: at 06:00

## 2019-06-10 RX ADMIN — GUAIFENESIN 100 MG: 200 SOLUTION ORAL at 18:05

## 2019-06-10 RX ADMIN — SODIUM CHLORIDE 250 MG: 900 INJECTION, SOLUTION INTRAVENOUS at 09:53

## 2019-06-10 RX ADMIN — CEPHALEXIN 500 MG: 500 CAPSULE ORAL at 23:08

## 2019-06-10 RX ADMIN — Medication 10 ML: at 14:46

## 2019-06-10 RX ADMIN — POLYETHYLENE GLYCOL (3350) 17 G: 17 POWDER, FOR SOLUTION ORAL at 14:46

## 2019-06-10 RX ADMIN — Medication 2 G: at 03:03

## 2019-06-10 RX ADMIN — Medication 2 G: at 14:46

## 2019-06-10 RX ADMIN — METHADONE HYDROCHLORIDE 10 MG: 10 TABLET ORAL at 18:06

## 2019-06-10 RX ADMIN — Medication 10 ML: at 05:27

## 2019-06-10 RX ADMIN — Medication 2 G: at 08:46

## 2019-06-10 RX ADMIN — ACETAMINOPHEN 650 MG: 325 TABLET, FILM COATED ORAL at 14:57

## 2019-06-10 RX ADMIN — SENNOSIDES AND DOCUSATE SODIUM 1 TABLET: 8.6; 5 TABLET ORAL at 08:46

## 2019-06-10 RX ADMIN — ENOXAPARIN SODIUM 70 MG: 60 INJECTION SUBCUTANEOUS at 18:05

## 2019-06-10 RX ADMIN — MIRTAZAPINE 7.5 MG: 15 TABLET, FILM COATED ORAL at 22:35

## 2019-06-10 RX ADMIN — METHADONE HYDROCHLORIDE 10 MG: 10 TABLET ORAL at 08:46

## 2019-06-10 NOTE — PROGRESS NOTES
Shift assessment complete. A&OX4. Lungs clear on auscultation. Respirations present. Even and unlabored. No s/s of distress. No c/o pain at this time. Call light within reach. Encouraged patient to call for assistance. Patient verbalizes understanding. See Doc Flowsheet for assessment details. Patient resting in bed. Will continue to monitor.

## 2019-06-10 NOTE — PROGRESS NOTES
Assessment completed and documented. Lung sounds clear. Heart sounds regular. Abdomen soft with mild tenderness. Patient complains that she feels constipated again but states she did have a bm and feel better yesterday. Bowel sounds active. Midline infusing without difficulty and good blood return noted. Patient voiding without difficulty. Currently resting in bed. Will continue to monitor with hourly rounding.

## 2019-06-10 NOTE — PROGRESS NOTES
Progress Note    Patient: Francesco  MRN: [id number]  SSN: [id number]    YOB: 1985  Age: 26 y.o. Sex: female      Admit Date: 6/4/2019    LOS: 6 days     Subjective:   Ms Ness Pena is a 25 y/o female with hx prior DVT/PE on xarelto in 2018, off now, IV drug abuse, polysubstance abuse (heroin, amphetamines, THC, opiates) who was admitted 5/9-6/2 for MSSA bacteremia, L1 osteomyelitis, left shoulder bursitis. She was to be on IV abx per ID through 6/24 but was discharged to go to her daughter's graduation on po keflex. She was re-admitted for AMS and heroin use wandering in the street. Her iv ancef was resumed via midline. Unfortunately, she developed an acute thrombosis associated to her catheter over her left axillary and basilic veins on 4/67. lovenox was started. Picc team is unable to place anymore line in view of clots. Plan for IR consult for central line placement. Objective:     Vitals:    06/09/19 1544 06/09/19 1958 06/09/19 2325 06/10/19 0355   BP: 103/66 115/67 112/76 92/58   Pulse: 95 90 98 (!) 102   Resp: 18 17 17 17   Temp: 98.4 °F (36.9 °C) 98.5 °F (36.9 °C) 98.7 °F (37.1 °C) 99.3 °F (37.4 °C)   SpO2: 97% 98% 97% 96%   Weight:       Height:            Intake and Output:  Current Shift: No intake/output data recorded. Last three shifts: No intake/output data recorded. Physical Exam:   GENERAL: alert  EYE: negative  LYMPHATIC: Cervical, supraclavicular, and axillary nodes normal.   THROAT & NECK: normal and no erythema or exudates noted. LUNG: clear to auscultation bilaterally  HEART: regular rate and rhythm, S1, S2 normal, no murmur, click, rub or gallop  ABDOMEN: soft, non-tender. Bowel sounds normal. No masses,  no organomegaly  EXTREMITIES:left arm pain at the axillary area. right leg edema up to the distal thigh ; mild tense to touch, mild tenderness, no redness R > L   SKIN: Normal.  NEUROLOGIC: negative  PSYCHIATRIC: non focal    Lab/Data Review:   All lab results for the last 24 hours reviewed. Assessment:     Principal Problem:    Sepsis ([facility] (6/4/2019)    Active Problems:    IVDU (intravenous drug user) (2/1/2019)      Drug abuse ([address] (6/4/2019)      Anemia (6/4/2019)      Altered mental status (6/4/2019)      Tachycardia (6/4/2019)      Infection of T12-L1 disc space ([facility] (6/9/2019)        Plan:     · Continue IV ancef for recent MSSA bacteremia eot 6/24  · Keflex in the mean time since she lost her Midline due to an acute DVT  · IR consult for central line placement  · Start lovenox sq bid   · On methadone while in house for uncontrolled pain. She is an active heroin user   · History of L1 OM, on iv ancef   · She has CORAZON, status post 2 PRBC. · Moderate malnutrition: on supplements- counseled - monitor   · Constipation:  senna and miralax     DVT prophylaxis: GCS     Code status: full    Disposition: she will stay to complete IV antibiotics until 6/24. The patient is uninsured.      Signed By: Carlie Moran MD     Silvia 10, 2019

## 2019-06-10 NOTE — PROGRESS NOTES
Duplex shows catheter associated thrombosis of the axillary and basilic veins. The patient complains of pain over her axillary area. Plan to remove midline  Start po keflex in the mean time a new midline or picc line can be placed. In case picc team is unable to access a new line, she will require IR for central line. Will start lovenox sq.

## 2019-06-10 NOTE — PROGRESS NOTES
Problem: Patient Education: Go to Patient Education Activity  Goal: Patient/Family Education  Outcome: Progressing Towards Goal     Problem: Patient Education: Go to Patient Education Activity  Goal: Patient/Family Education  Outcome: Progressing Towards Goal     Problem: Nutrition Deficit  Goal: *Optimize nutritional status  Outcome: Progressing Towards Goal

## 2019-06-11 LAB
ANION GAP SERPL CALC-SCNC: 9 MMOL/L (ref 7–16)
BASOPHILS # BLD: 0 K/UL (ref 0–0.2)
BASOPHILS NFR BLD: 1 % (ref 0–2)
BUN SERPL-MCNC: 4 MG/DL (ref 6–23)
CALCIUM SERPL-MCNC: 8.6 MG/DL (ref 8.3–10.4)
CHLORIDE SERPL-SCNC: 102 MMOL/L (ref 98–107)
CO2 SERPL-SCNC: 29 MMOL/L (ref 21–32)
CREAT SERPL-MCNC: 0.46 MG/DL (ref 0.6–1)
DIFFERENTIAL METHOD BLD: ABNORMAL
EOSINOPHIL # BLD: 0 K/UL (ref 0–0.8)
EOSINOPHIL NFR BLD: 1 % (ref 0.5–7.8)
ERYTHROCYTE [DISTWIDTH] IN BLOOD BY AUTOMATED COUNT: 14.9 % (ref 11.9–14.6)
GLUCOSE SERPL-MCNC: 96 MG/DL (ref 65–100)
HCT VFR BLD AUTO: 32.6 % (ref 35.8–46.3)
HGB BLD-MCNC: 10.4 G/DL (ref 11.7–15.4)
IMM GRANULOCYTES # BLD AUTO: 0 K/UL (ref 0–0.5)
IMM GRANULOCYTES NFR BLD AUTO: 0 % (ref 0–5)
LYMPHOCYTES # BLD: 2.1 K/UL (ref 0.5–4.6)
LYMPHOCYTES NFR BLD: 26 % (ref 13–44)
MCH RBC QN AUTO: 27.4 PG (ref 26.1–32.9)
MCHC RBC AUTO-ENTMCNC: 31.9 G/DL (ref 31.4–35)
MCV RBC AUTO: 85.8 FL (ref 79.6–97.8)
MONOCYTES # BLD: 0.9 K/UL (ref 0.1–1.3)
MONOCYTES NFR BLD: 12 % (ref 4–12)
NEUTS SEG # BLD: 5 K/UL (ref 1.7–8.2)
NEUTS SEG NFR BLD: 62 % (ref 43–78)
NRBC # BLD: 0 K/UL (ref 0–0.2)
PLATELET # BLD AUTO: 275 K/UL (ref 150–450)
PMV BLD AUTO: 9 FL (ref 9.4–12.3)
POTASSIUM SERPL-SCNC: 3.5 MMOL/L (ref 3.5–5.1)
RBC # BLD AUTO: 3.8 M/UL (ref 4.05–5.2)
SODIUM SERPL-SCNC: 140 MMOL/L (ref 136–145)
WBC # BLD AUTO: 8.1 K/UL (ref 4.3–11.1)

## 2019-06-11 PROCEDURE — 85025 COMPLETE CBC W/AUTO DIFF WBC: CPT

## 2019-06-11 PROCEDURE — 74011250637 HC RX REV CODE- 250/637: Performed by: INTERNAL MEDICINE

## 2019-06-11 PROCEDURE — 74011250637 HC RX REV CODE- 250/637

## 2019-06-11 PROCEDURE — 74011250637 HC RX REV CODE- 250/637: Performed by: FAMILY MEDICINE

## 2019-06-11 PROCEDURE — 74011250637 HC RX REV CODE- 250/637: Performed by: HOSPITALIST

## 2019-06-11 PROCEDURE — 80048 BASIC METABOLIC PNL TOTAL CA: CPT

## 2019-06-11 PROCEDURE — 36415 COLL VENOUS BLD VENIPUNCTURE: CPT

## 2019-06-11 PROCEDURE — 74011250636 HC RX REV CODE- 250/636: Performed by: INTERNAL MEDICINE

## 2019-06-11 PROCEDURE — 65270000029 HC RM PRIVATE

## 2019-06-11 RX ORDER — IBUPROFEN 200 MG
400 TABLET ORAL
Status: DISCONTINUED | OUTPATIENT
Start: 2019-06-11 | End: 2019-06-12 | Stop reason: HOSPADM

## 2019-06-11 RX ORDER — IBUPROFEN 600 MG/1
TABLET ORAL
Status: COMPLETED
Start: 2019-06-11 | End: 2019-06-11

## 2019-06-11 RX ORDER — IBUPROFEN 600 MG/1
600 TABLET ORAL ONCE
Status: COMPLETED | OUTPATIENT
Start: 2019-06-11 | End: 2019-06-11

## 2019-06-11 RX ADMIN — CEPHALEXIN 500 MG: 500 CAPSULE ORAL at 11:32

## 2019-06-11 RX ADMIN — GUAIFENESIN 100 MG: 200 SOLUTION ORAL at 11:34

## 2019-06-11 RX ADMIN — CEPHALEXIN 500 MG: 500 CAPSULE ORAL at 06:04

## 2019-06-11 RX ADMIN — CEPHALEXIN 500 MG: 500 CAPSULE ORAL at 23:43

## 2019-06-11 RX ADMIN — POLYETHYLENE GLYCOL (3350) 17 G: 17 POWDER, FOR SOLUTION ORAL at 09:12

## 2019-06-11 RX ADMIN — IBUPROFEN 400 MG: 200 TABLET, FILM COATED ORAL at 20:30

## 2019-06-11 RX ADMIN — ACETAMINOPHEN 650 MG: 325 TABLET, FILM COATED ORAL at 09:12

## 2019-06-11 RX ADMIN — IBUPROFEN 600 MG: 600 TABLET ORAL at 01:27

## 2019-06-11 RX ADMIN — ENOXAPARIN SODIUM 70 MG: 60 INJECTION SUBCUTANEOUS at 06:04

## 2019-06-11 RX ADMIN — METHADONE HYDROCHLORIDE 10 MG: 10 TABLET ORAL at 17:30

## 2019-06-11 RX ADMIN — MIRTAZAPINE 7.5 MG: 15 TABLET, FILM COATED ORAL at 20:30

## 2019-06-11 RX ADMIN — CEPHALEXIN 500 MG: 500 CAPSULE ORAL at 17:30

## 2019-06-11 RX ADMIN — METHADONE HYDROCHLORIDE 10 MG: 10 TABLET ORAL at 09:13

## 2019-06-11 RX ADMIN — IBUPROFEN: 600 TABLET ORAL at 03:00

## 2019-06-11 RX ADMIN — SENNOSIDES AND DOCUSATE SODIUM 1 TABLET: 8.6; 5 TABLET ORAL at 09:13

## 2019-06-11 NOTE — PROGRESS NOTES
Problem: Patient Education: Go to Patient Education Activity  Goal: Patient/Family Education  Outcome: Progressing Towards Goal

## 2019-06-11 NOTE — PROGRESS NOTES
Hospitalist Progress Note     Admit Date:  2019 12:42 AM   Name:  Tonya Fall   Age:  26 y.o.  :  1985   MRN:  [id number]   PCP:  Chan Herrera MD  Treatment Team: Attending Provider: Wendy Keller MD; : Mehreen Wallace; Utilization Review: Chloe Segovia RN; Care Manager: Eden López LMSW    HPI/Subjective:   Ms Astrid Bumpers is a 25 y/o female with hx prior DVT/PE on xarelto in 2018, off now, IV drug abuse, polysubstance abuse (heroin, amphetamines, THC, opiates) who was admitted - for MSSA bacteremia, L1 osteomyelitis, left shoulder bursitis. She was to be on IV abx per ID through  but was discharged to go to her daughter's graduation on po keflex. She was re-admitted for AMS and heroin use wandering in the street. Her iv ancef was resumed via midline. Unfortunately, she developed an acute thrombosis associated to her catheter over her left axillary and basilic veins on . lovenox was started. Picc team was unable to place anymore line in view of clots. IR consulted for central line placement.  - pt denies new complaints. Resting comfortably. Some fevers overnight. No pain, SOB    Objective:     Patient Vitals for the past 24 hrs:   Temp Pulse Resp BP SpO2   19 0409 98.8 °F (37.1 °C) (!) 105 18 93/63 94 %   19 0204 99.2 °F (37.3 °C) 92      19 0110 (!) 102 °F (38.9 °C)       19 0027 (!) 101.9 °F (38.8 °C) (!) 110 20 109/69 97 %   06/10/19 1938 98.7 °F (37.1 °C) 93 18 110/76 99 %     Oxygen Therapy  O2 Sat (%): 94 % (19 0409)  O2 Device: Room air (19 1605)      Intake/Output Summary (Last 24 hours) at 2019 0815  Last data filed at 6/10/2019 1800  Gross per 24 hour   Intake    Output 475 ml   Net -475 ml       *Note that automatically entered I/Os may not be accurate; dependent on patient compliance with collection and accurate  by techs. General:    Well nourished. Alert. CV:   RRR.   No murmur, rub, or gallop. Lungs:   CTAB. No wheezing, rhonchi, or rales. Extremities: Warm and dry. No cyanosis or edema. Skin:     No rashes or jaundice. Neuro:  No gross focal deficits    Data Review:  I have reviewed all labs, meds, and studies from the last 24 hours:    Recent Results (from the past 24 hour(s))   CBC WITH AUTOMATED DIFF    Collection Time: 06/11/19  6:44 AM   Result Value Ref Range    WBC 8.1 4.3 - 11.1 K/uL    RBC 3.80 (L) 4.05 - 5.2 M/uL    HGB 10.4 (L) 11.7 - 15.4 g/dL    HCT 32.6 (L) 35.8 - 46.3 %    MCV 85.8 79.6 - 97.8 FL    MCH 27.4 26.1 - 32.9 PG    MCHC 31.9 31.4 - 35.0 g/dL    RDW 14.9 (H) 11.9 - 14.6 %    PLATELET 599 132 - 489 K/uL    MPV 9.0 (L) 9.4 - 12.3 FL    ABSOLUTE NRBC 0.00 0.0 - 0.2 K/uL    DF AUTOMATED      NEUTROPHILS 62 43 - 78 %    LYMPHOCYTES 26 13 - 44 %    MONOCYTES 12 4.0 - 12.0 %    EOSINOPHILS 1 0.5 - 7.8 %    BASOPHILS 1 0.0 - 2.0 %    IMMATURE GRANULOCYTES 0 0.0 - 5.0 %    ABS. NEUTROPHILS 5.0 1.7 - 8.2 K/UL    ABS. LYMPHOCYTES 2.1 0.5 - 4.6 K/UL    ABS. MONOCYTES 0.9 0.1 - 1.3 K/UL    ABS. EOSINOPHILS 0.0 0.0 - 0.8 K/UL    ABS. BASOPHILS 0.0 0.0 - 0.2 K/UL    ABS. IMM.  GRANS. 0.0 0.0 - 0.5 K/UL   METABOLIC PANEL, BASIC    Collection Time: 06/11/19  6:44 AM   Result Value Ref Range    Sodium 140 136 - 145 mmol/L    Potassium 3.5 3.5 - 5.1 mmol/L    Chloride 102 98 - 107 mmol/L    CO2 29 21 - 32 mmol/L    Anion gap 9 7 - 16 mmol/L    Glucose 96 65 - 100 mg/dL    BUN 4 (L) 6 - 23 MG/DL    Creatinine 0.46 (L) 0.6 - 1.0 MG/DL    GFR est AA >60 >60 ml/min/1.73m2    GFR est non-AA >60 >60 ml/min/1.73m2    Calcium 8.6 8.3 - 10.4 MG/DL        All Micro Results     Procedure Component Value Units Date/Time    CULTURE, BLOOD [[id number]] Collected:  06/04/19 0218    Order Status:  Completed Specimen:  Blood Updated:  06/09/19 0527     Special Requests: LEFT FOREARM        Culture result: NO GROWTH 5 DAYS       CULTURE, BLOOD [[id number]] Collected:  06/04/19 1850 Order Status:  Completed Specimen:  Blood Updated:  06/09/19 0527     Special Requests: --        RIGHT  SINGLE PORT       Culture result: NO GROWTH 5 DAYS       CULTURE, URINE [[id number]] Collected:  06/04/19 0142    Order Status:  Completed Specimen:  Urine from Clean catch Updated:  06/06/19 0828     Special Requests: NO SPECIAL REQUESTS        Culture result:       <10,000  COLONIES/mL  NORMAL SKIN MARTITA ISOLATED            No results found for this visit on 06/04/19. Current Meds:  Current Facility-Administered Medications   Medication Dose Route Frequency    polyethylene glycol (MIRALAX) packet 17 g  17 g Oral DAILY    guaiFENesin (ROBITUSSIN) 100 mg/5 mL oral liquid 100 mg  100 mg Oral Q4H PRN    cephALEXin (KEFLEX) capsule 500 mg  500 mg Oral Q6H    mirtazapine (REMERON) tablet 7.5 mg  7.5 mg Oral QHS    enoxaparin (LOVENOX) injection 70 mg  1 mg/kg SubCUTAneous Q12H    senna-docusate (PERICOLACE) 8.6-50 mg per tablet 1 Tab  1 Tab Oral DAILY    sodium chloride (NS) flush 10 mL  10 mL InterCATHeter Q8H    sodium chloride (NS) flush 10 mL  10 mL InterCATHeter PRN    0.9% sodium chloride infusion 250 mL  250 mL IntraVENous PRN    0.9% sodium chloride infusion 250 mL  250 mL IntraVENous PRN    sodium chloride (NS) flush 5-40 mL  5-40 mL IntraVENous Q8H    sodium chloride (NS) flush 5-40 mL  5-40 mL IntraVENous PRN    acetaminophen (TYLENOL) tablet 650 mg  650 mg Oral Q4H PRN    diphenhydrAMINE (BENADRYL) capsule 25 mg  25 mg Oral Q4H PRN    ondansetron (ZOFRAN) injection 4 mg  4 mg IntraVENous Q4H PRN    methadone (DOLOPHINE) tablet 10 mg  10 mg Oral BID       Other Studies (last 24 hours):  Mri Shoulder Lt Wo Cont    Result Date: 6/10/2019  MRI OF THE LEFT SHOULDER WITHOUT CONTRAST. Clinical Indication: Left shoulder pain in a patient with bacteremia, evaluate for possible osteomyelitis.  Procedure: Multiplanar and multisequence MR images of the left shoulder were performed without gadolinium contrast. Comparison: MRI of the left shoulder dated 5/31/2019 FINDINGS: Images are significantly degraded by motion. Imaging possible submitted. AC joint: The AC joint is unchanged when compared to the prior exam. There is no fluid within the [facility] joint itself. A moderate amount of fluid is again noted in the subacromial/subdeltoid bursa in keeping with bursitis. Rotator cuff: There is persistent edema along the supraspinatus and infraspinatus muscle bellies likely representing a mild underlying myositis. The supraspinatus, infraspinatus, teres minor, and subscapularis tendons are intact. No rotator cuff tendon tear evident. Biceps labral complex: Evaluation of the superior labrum is limited by motion degradation. There is no gross tear of the biceps tendon. There is no joint effusion. The joint capsule in axillary recess is intact. Glenohumeral joint: Cellular marrow reconversion signal again noted throughout the imaged osseous structures. No strong findings present to indicate osteomyelitis. There is no joint effusion present at the glenohumeral joint to more strongly suspect septic arthritis. These findings are unchanged from the prior exam. There is persistent inflammation within the anterior belly of the deltoid. Intramuscular abscess cannot be excluded without the benefit of intravenous contrast. Shotty lymph nodes are noted in the left axilla. IMPRESSION: Limited exam secondary to motion degradation on multiple images. 1. Subacromial/subdeltoid bursitis essentially unchanged from the prior exam with reactive myositis suspected in the supraspinatus and infraspinatus muscle bellies. 2. Cellular marrow reconversion signal is noted in the imaged bones. No strong findings present to suspect osteomyelitis in the shoulder. 3. No joint effusion is noted at the [facility]. or glenohumeral joints present to suspect septic arthritis. 4. Acute myositis suspected in the anterior belly of the deltoid with possible pyomyositis. Intramuscular abscess is not able to be excluded without the benefit of intravenous contrast.      Duplex Upper Ext Venous Left    Result Date: 6/10/2019  TITLE: Upper extremity venous ultrasound examination. INDICATION: Left upper extremity swelling and pain x2 days. Left upper extremity midline vascular access device. . TECHNIQUE: Grayscale, Color, and Spectral Doppler interrogation performed. COMPARISON: Chest x-ray 6/4/2019. FINDINGS: Patent right brachiocephalic and subclavian veins. Patent left internal jugular, brachiocephalic, and subclavian veins. Occlusive echogenic thrombus around the catheter in the left axillary and basilic veins. Patent left cephalic, paired brachial, radial, and ulnar veins. IMPRESSION: Catheter associated thrombus in the left axillary and basilic veins.        Assessment and Plan:     Hospital Problems as of 6/11/2019 Never Reviewed          Codes Class Noted - Resolved POA    Infection of T12-L1 disc space ([facility] ICD-10-CM: M46.35  ICD-9-CM: 730.98  6/9/2019 - Present Unknown        Drug abuse ([facility] ICD-10-CM: F19.10  ICD-9-CM: 305.90  6/4/2019 - Present Yes        * (Principal) Sepsis ([facility] ICD-10-CM: A41.9  ICD-9-CM: 038.9, 995.91  6/4/2019 - Present Yes        Anemia ICD-10-CM: D64.9  ICD-9-CM: 285.9  6/4/2019 - Present Yes        Altered mental status ICD-10-CM: R41.82  ICD-9-CM: 780.97  6/4/2019 - Present Yes        Tachycardia ICD-10-CM: R00.0  ICD-9-CM: 785.0  6/4/2019 - Present Yes        IVDU (intravenous drug user) ICD-10-CM: F19.90  ICD-9-CM: 305.90  2/1/2019 - Present Yes              Plan:  MSSA bacteremia  -ancef EOT 6/24 per ID  -needs central line by IR    DVT  -lovenox    Heroin Use  -no IV narcotics  -methadone    DC planning/Dispo:    -home 6/24    Diet:  DIET REGULAR  DIET NUTRITIONAL SUPPLEMENTS      Signed:  Yue Jarvis MD

## 2019-06-11 NOTE — PROGRESS NOTES
Interdisciplinary team rounds were held 6/11/2019 with the following team members:Care Management, Nursing, Nutrition, Pharmacy, Physical Therapy and Physician. Plan of care discussed. See clinical pathway and/or care plan for interventions and desired outcomes.

## 2019-06-11 NOTE — PROGRESS NOTES
Assessment completed and documented. Lung sounds clear. Edema to BLE. Abdomen soft. Bowel sounds active. Patient no longer feels constipated. Lung sounds clear. Heart sounds regular. Patient currently up in chair. Will continue to monitor with hourly rounding.

## 2019-06-12 ENCOUNTER — HOSPITAL ENCOUNTER (OUTPATIENT)
Dept: INTERVENTIONAL RADIOLOGY/VASCULAR | Age: 34
Discharge: HOME | End: 2019-06-12
Attending: INTERNAL MEDICINE
Payer: SUBSIDIZED

## 2019-06-12 VITALS
SYSTOLIC BLOOD PRESSURE: 107 MMHG | RESPIRATION RATE: 22 BRPM | TEMPERATURE: 98.4 F | OXYGEN SATURATION: 100 % | HEART RATE: 118 BPM | DIASTOLIC BLOOD PRESSURE: 72 MMHG

## 2019-06-12 PROBLEM — D63.8 ANEMIA OF CHRONIC DISEASE: Status: ACTIVE | Noted: 2019-06-04

## 2019-06-12 PROBLEM — D63.8 ANEMIA OF CHRONIC DISEASE: Chronic | Status: ACTIVE | Noted: 2019-06-04

## 2019-06-12 PROBLEM — F19.90 IVDU (INTRAVENOUS DRUG USER): Chronic | Status: ACTIVE | Noted: 2019-02-01

## 2019-06-12 PROBLEM — G93.41 ACUTE METABOLIC ENCEPHALOPATHY: Status: ACTIVE | Noted: 2019-06-04

## 2019-06-12 PROCEDURE — C1751 CATH, INF, PER/CENT/MIDLINE: HCPCS

## 2019-06-12 PROCEDURE — 74011250636 HC RX REV CODE- 250/636: Performed by: RADIOLOGY

## 2019-06-12 PROCEDURE — 36558 INSERT TUNNELED CV CATH: CPT

## 2019-06-12 PROCEDURE — 74011250636 HC RX REV CODE- 250/636: Performed by: INTERNAL MEDICINE

## 2019-06-12 PROCEDURE — 74011250637 HC RX REV CODE- 250/637: Performed by: HOSPITALIST

## 2019-06-12 PROCEDURE — 74011000250 HC RX REV CODE- 250: Performed by: RADIOLOGY

## 2019-06-12 PROCEDURE — 74011250637 HC RX REV CODE- 250/637: Performed by: INTERNAL MEDICINE

## 2019-06-12 PROCEDURE — 77030031139 HC SUT VCRL2 J&J -A

## 2019-06-12 PROCEDURE — 65270000029 HC RM PRIVATE

## 2019-06-12 PROCEDURE — 74011250636 HC RX REV CODE- 250/636

## 2019-06-12 RX ORDER — ENOXAPARIN SODIUM 100 MG/ML
1 INJECTION SUBCUTANEOUS EVERY 12 HOURS
Status: DISCONTINUED | OUTPATIENT
Start: 2019-06-12 | End: 2019-06-15 | Stop reason: HOSPADM

## 2019-06-12 RX ORDER — LIDOCAINE HYDROCHLORIDE 20 MG/ML
1-20 INJECTION, SOLUTION EPIDURAL; INFILTRATION; INTRACAUDAL; PERINEURAL ONCE
Status: COMPLETED | OUTPATIENT
Start: 2019-06-12 | End: 2019-06-12

## 2019-06-12 RX ORDER — CEFAZOLIN SODIUM/WATER 2 G/20 ML
2 SYRINGE (ML) INTRAVENOUS EVERY 6 HOURS
Status: DISCONTINUED | OUTPATIENT
Start: 2019-06-12 | End: 2019-06-12 | Stop reason: HOSPADM

## 2019-06-12 RX ORDER — TRAMADOL HYDROCHLORIDE 50 MG/1
50 TABLET ORAL
Status: DISCONTINUED | OUTPATIENT
Start: 2019-06-12 | End: 2019-06-15 | Stop reason: HOSPADM

## 2019-06-12 RX ORDER — HEPARIN SODIUM 200 [USP'U]/100ML
1000 INJECTION, SOLUTION INTRAVENOUS AS NEEDED
Status: DISCONTINUED | OUTPATIENT
Start: 2019-06-12 | End: 2019-06-16 | Stop reason: HOSPADM

## 2019-06-12 RX ORDER — GUAIFENESIN 100 MG/5ML
100 SOLUTION ORAL
Status: DISCONTINUED | OUTPATIENT
Start: 2019-06-12 | End: 2019-06-15 | Stop reason: HOSPADM

## 2019-06-12 RX ORDER — ONDANSETRON 2 MG/ML
4 INJECTION INTRAMUSCULAR; INTRAVENOUS
Status: DISCONTINUED | OUTPATIENT
Start: 2019-06-12 | End: 2019-06-15 | Stop reason: HOSPADM

## 2019-06-12 RX ORDER — IBUPROFEN 200 MG
400 TABLET ORAL
Status: DISCONTINUED | OUTPATIENT
Start: 2019-06-12 | End: 2019-06-15 | Stop reason: HOSPADM

## 2019-06-12 RX ORDER — MIRTAZAPINE 15 MG/1
7.5 TABLET, FILM COATED ORAL
Status: DISCONTINUED | OUTPATIENT
Start: 2019-06-12 | End: 2019-06-15 | Stop reason: HOSPADM

## 2019-06-12 RX ORDER — ACETAMINOPHEN 325 MG/1
650 TABLET ORAL
Status: DISCONTINUED | OUTPATIENT
Start: 2019-06-12 | End: 2019-06-15 | Stop reason: HOSPADM

## 2019-06-12 RX ORDER — METHADONE HYDROCHLORIDE 10 MG/1
10 TABLET ORAL 2 TIMES DAILY
Status: DISCONTINUED | OUTPATIENT
Start: 2019-06-12 | End: 2019-06-15 | Stop reason: HOSPADM

## 2019-06-12 RX ORDER — TRAMADOL HYDROCHLORIDE 50 MG/1
50 TABLET ORAL
Status: DISCONTINUED | OUTPATIENT
Start: 2019-06-12 | End: 2019-06-12 | Stop reason: HOSPADM

## 2019-06-12 RX ORDER — MIDAZOLAM HYDROCHLORIDE 1 MG/ML
3 INJECTION, SOLUTION INTRAMUSCULAR; INTRAVENOUS ONCE
Status: COMPLETED | OUTPATIENT
Start: 2019-06-12 | End: 2019-06-12

## 2019-06-12 RX ORDER — DIPHENHYDRAMINE HCL 25 MG
25 CAPSULE ORAL
Status: DISCONTINUED | OUTPATIENT
Start: 2019-06-12 | End: 2019-06-15 | Stop reason: HOSPADM

## 2019-06-12 RX ORDER — AMOXICILLIN 250 MG
1 CAPSULE ORAL DAILY
Status: DISCONTINUED | OUTPATIENT
Start: 2019-06-13 | End: 2019-06-15 | Stop reason: HOSPADM

## 2019-06-12 RX ORDER — CEFAZOLIN SODIUM/WATER 2 G/20 ML
2 SYRINGE (ML) INTRAVENOUS EVERY 6 HOURS
Status: DISCONTINUED | OUTPATIENT
Start: 2019-06-12 | End: 2019-06-15 | Stop reason: HOSPADM

## 2019-06-12 RX ORDER — POLYETHYLENE GLYCOL 3350 17 G/17G
17 POWDER, FOR SOLUTION ORAL DAILY
Status: DISCONTINUED | OUTPATIENT
Start: 2019-06-13 | End: 2019-06-15 | Stop reason: HOSPADM

## 2019-06-12 RX ADMIN — IBUPROFEN 400 MG: 200 TABLET, FILM COATED ORAL at 19:21

## 2019-06-12 RX ADMIN — TRAMADOL HYDROCHLORIDE 50 MG: 50 TABLET, FILM COATED ORAL at 16:25

## 2019-06-12 RX ADMIN — METHADONE HYDROCHLORIDE 10 MG: 10 TABLET ORAL at 07:47

## 2019-06-12 RX ADMIN — Medication 2 G: at 14:39

## 2019-06-12 RX ADMIN — SENNOSIDES AND DOCUSATE SODIUM 1 TABLET: 8.6; 5 TABLET ORAL at 07:47

## 2019-06-12 RX ADMIN — METHADONE HYDROCHLORIDE 10 MG: 10 TABLET ORAL at 17:29

## 2019-06-12 RX ADMIN — Medication 2 G: at 19:23

## 2019-06-12 RX ADMIN — ENOXAPARIN SODIUM 70 MG: 60 INJECTION SUBCUTANEOUS at 19:07

## 2019-06-12 RX ADMIN — LIDOCAINE HYDROCHLORIDE 200 MG: 20 INJECTION, SOLUTION EPIDURAL; INFILTRATION; INTRACAUDAL; PERINEURAL at 12:15

## 2019-06-12 RX ADMIN — MIDAZOLAM HYDROCHLORIDE 3 MG: 1 INJECTION, SOLUTION INTRAMUSCULAR; INTRAVENOUS at 11:48

## 2019-06-12 RX ADMIN — HEPARIN SODIUM 2000 UNITS: 200 INJECTION, SOLUTION INTRAVENOUS at 12:22

## 2019-06-12 RX ADMIN — ACETAMINOPHEN 650 MG: 325 TABLET, FILM COATED ORAL at 07:47

## 2019-06-12 RX ADMIN — CEPHALEXIN 500 MG: 500 CAPSULE ORAL at 05:36

## 2019-06-12 RX ADMIN — LIDOCAINE HYDROCHLORIDE 300 MG: 10; .005 INJECTION, SOLUTION EPIDURAL; INFILTRATION; INTRACAUDAL; PERINEURAL at 12:16

## 2019-06-12 NOTE — PROGRESS NOTES
Hospitalist Progress Note     Admit Date:  2019 12:42 AM   Name:  Avis Louise   Age:  26 y.o.  :  1985   MRN:  [id number]   PCP:  Mauro Barker MD  Treatment Team: Attending Provider: Brant Wang MD; : Nadya Tim; Utilization Review: Skylar Will RN; Care Manager: Per Fonseca LMSW    HPI/Subjective:   Ms Margene Closs is a 25 y/o female with hx prior DVT/PE on xarelto in 2018, off now, IV drug abuse, polysubstance abuse (heroin, amphetamines, THC, opiates) who was admitted - for MSSA bacteremia, L1 osteomyelitis, left shoulder bursitis. She was to be on IV abx per ID through  but was discharged to go to her daughter's graduation on po keflex. She was re-admitted for AMS and heroin use wandering in the street. Her iv ancef was resumed via midline. Unfortunately, she developed an acute thrombosis associated to her catheter over her left axillary and basilic veins on . lovenox was started. Picc team was unable to place anymore line in view of clots. IR consulted for central line placement.  - pt c/o L arm pain uncontrolled by current tylenol. Fevers overnight, better now. Pt denies other complaints. No CP, SOB, dysuria, diarrhea    Objective:     Patient Vitals for the past 24 hrs:   Temp Pulse Resp BP SpO2   19 0318 99 °F (37.2 °C) 64 18 100/67 98 %   19 2342  (!) 112  93/54    19 2258 97 °F (36.1 °C) (!) 128 20 (!) 84/47 97 %   19 1256 98.2 °F (36.8 °C) (!) 109 18 91/70 98 %     Oxygen Therapy  O2 Sat (%): 98 % (19 0318)  O2 Device: Room air (19 1530)      Intake/Output Summary (Last 24 hours) at 2019 0758  Last data filed at 2019 1255  Gross per 24 hour   Intake    Output 300 ml   Net -300 ml       *Note that automatically entered I/Os may not be accurate; dependent on patient compliance with collection and accurate  by techs. General:    Well nourished. Alert. CV:   RRR.   No murmur, rub, or gallop. Lungs:   CTAB. No wheezing, rhonchi, or rales. Extremities: Warm and dry. No cyanosis or edema. L arm swelling and ttp from DVT  Skin:     No rashes or jaundice. Neuro:  No gross focal deficits    Data Review:  I have reviewed all labs, meds, and studies from the last 24 hours:    No results found for this or any previous visit (from the past 24 hour(s)). All Micro Results     Procedure Component Value Units Date/Time    CULTURE, BLOOD [[id number]] Collected:  06/04/19 0218    Order Status:  Completed Specimen:  Blood Updated:  06/09/19 0527     Special Requests: LEFT FOREARM        Culture result: NO GROWTH 5 DAYS       CULTURE, BLOOD [[id number]] Collected:  06/04/19 0232    Order Status:  Completed Specimen:  Blood Updated:  06/09/19 0527     Special Requests: --        RIGHT  SINGLE PORT       Culture result: NO GROWTH 5 DAYS       CULTURE, URINE [[id number]] Collected:  06/04/19 0142    Order Status:  Completed Specimen:  Urine from Clean catch Updated:  06/06/19 0828     Special Requests: NO SPECIAL REQUESTS        Culture result:       <10,000  COLONIES/mL  NORMAL SKIN MARTITA ISOLATED            No results found for this visit on 06/04/19.     Current Meds:  Current Facility-Administered Medications   Medication Dose Route Frequency    ceFAZolin (ANCEF) 2 g/20 mL in sterile water IV syringe  2 g IntraVENous Q6H    ibuprofen (MOTRIN) tablet 400 mg  400 mg Oral Q6H PRN    polyethylene glycol (MIRALAX) packet 17 g  17 g Oral DAILY    guaiFENesin (ROBITUSSIN) 100 mg/5 mL oral liquid 100 mg  100 mg Oral Q4H PRN    cephALEXin (KEFLEX) capsule 500 mg  500 mg Oral Q6H    mirtazapine (REMERON) tablet 7.5 mg  7.5 mg Oral QHS    enoxaparin (LOVENOX) injection 70 mg  1 mg/kg SubCUTAneous Q12H    senna-docusate (PERICOLACE) 8.6-50 mg per tablet 1 Tab  1 Tab Oral DAILY    0.9% sodium chloride infusion 250 mL  250 mL IntraVENous PRN    0.9% sodium chloride infusion 250 mL  250 mL IntraVENous PRN    acetaminophen (TYLENOL) tablet 650 mg  650 mg Oral Q4H PRN    diphenhydrAMINE (BENADRYL) capsule 25 mg  25 mg Oral Q4H PRN    ondansetron (ZOFRAN) injection 4 mg  4 mg IntraVENous Q4H PRN    methadone (DOLOPHINE) tablet 10 mg  10 mg Oral BID       Other Studies (last 24 hours):  No results found.     Assessment and Plan:     Hospital Problems as of 6/12/2019 Never Reviewed          Codes Class Noted - Resolved POA    Infection of [facility]12-L1 disc [facility] ([facility] ICD-10-CM: M46.35  ICD-9-CM: 730.98  6/9/2019 - Present Yes        Drug abuse ([facility] ICD-10-CM: F19.10  ICD-9-CM: 305.90  6/4/2019 - Present Yes        * (Principal) Sepsis ([facility] ICD-10-CM: A41.9  ICD-9-CM: 038.9, 995.91  6/4/2019 - Present Yes        Anemia ICD-10-CM: D64.9  ICD-9-CM: 285.9  6/4/2019 - Present Yes        Altered mental status ICD-10-CM: R41.82  ICD-9-CM: 780.97  6/4/2019 - Present Yes        Tachycardia ICD-10-CM: R00.0  ICD-9-CM: 785.0  6/4/2019 - Present Yes        IVDU (intravenous drug user) ICD-10-CM: F19.90  ICD-9-CM: 305.90  2/1/2019 - Present Yes              Plan:  MSSA bacteremia  -ancef EOT 6/24 per ID  -central line by IR today    DVT  -lovenox  -resume home xarelto at discharge; avoid in hospital in case we lose line access and need another procedure to replace    Heroin Use  -avoid narcotics; is on methadone    DC planning/Dispo:    -home 6/24    Diet:  DIET REGULAR  DIET NUTRITIONAL SUPPLEMENTS      Signed:  Ludmila Burnham MD

## 2019-06-12 NOTE — PROGRESS NOTES
TRANSFER - OUT REPORT:    Verbal report given to Diamond Gutierrez RN(name) on Curtis Leak  being transferred to Med-Surg(unit) for routine progression of care       Report consisted of patients Situation, Background, Assessment and   Recommendations(SBAR). Information from the following report(s) SBAR, Kardex, Procedure Summary and MAR was reviewed with the receiving nurse. Lines:   Single Lumen Venous Catheter 06/12/19 Right Internal jugular (Active)        Opportunity for questions and clarification was provided. Pt will be transferred back to HOSPITAL [address] via [address] [place] [address].

## 2019-06-12 NOTE — PROGRESS NOTES
Pt assessment completed. Pt in chair. NAD noted. Call light and essentials within reach. Pt aware to call with needs. Will monitor.

## 2019-06-12 NOTE — PROGRESS NOTES
Patient received resting in bed. Medicated for c/o left arm/right leg pain (10/10) with ibuprofen 400mg po per MD order/m patient request. Shift assessment completed. Will monitor.

## 2019-06-12 NOTE — PROCEDURES
Department of Interventional Radiology  [phone]        Interventional Radiology Brief Procedure Note    Patient: Breana Bright MRN: [id number]  SSN: [id number]    YOB: 1985  Age: 26 y.o.   Sex: female      Date of Procedure: 6/12/2019    Pre-Procedure Diagnosis: Infection    Post-Procedure Diagnosis: SAME    Procedure(s): Tunneled Central Venous Catheter    Description of Procedure: As above    Performed By: Migel Goins MD     Assistants: None    Anesthesia:Lidocaine    Estimated Blood Loss: None    Specimens: None    Implants: Tunneled Central Venous Catheter    Findings: Tip in SVC    Complications: None    Recommendations: Okay to use central line    Follow Up: PRN    Signed By: Migel Goins MD     June 12, 2019

## 2019-06-12 NOTE — PROGRESS NOTES
Pt resting in chair and is alert and oriented x 4. She denies pain and is on room air. RR even and unlabored. Call light in reach and pt instructed to call for assistance if needed. Will monitor.

## 2019-06-12 NOTE — PROGRESS NOTES
TRANSFER - IN REPORT:    Verbal report received from [address] [place] [address], RN(name) on Hazel Monte  being received from IR(unit) for routine progression of care      Report consisted of patients Situation, Background, Assessment and   Recommendations(SBAR). Information from the following report(s) OR Summary was reviewed with the receiving nurse. Opportunity for questions and clarification was provided. Assessment completed upon patients arrival to unit and care assumed.

## 2019-06-13 PROCEDURE — 74011250636 HC RX REV CODE- 250/636: Performed by: INTERNAL MEDICINE

## 2019-06-13 PROCEDURE — 65270000029 HC RM PRIVATE

## 2019-06-13 PROCEDURE — 74011250637 HC RX REV CODE- 250/637: Performed by: INTERNAL MEDICINE

## 2019-06-13 RX ADMIN — ENOXAPARIN SODIUM 70 MG: 60 INJECTION SUBCUTANEOUS at 19:41

## 2019-06-13 RX ADMIN — METHADONE HYDROCHLORIDE 10 MG: 10 TABLET ORAL at 17:25

## 2019-06-13 RX ADMIN — METHADONE HYDROCHLORIDE 10 MG: 10 TABLET ORAL at 08:16

## 2019-06-13 RX ADMIN — TRAMADOL HYDROCHLORIDE 50 MG: 50 TABLET, FILM COATED ORAL at 00:28

## 2019-06-13 RX ADMIN — SENNOSIDES AND DOCUSATE SODIUM 1 TABLET: 8.6; 5 TABLET ORAL at 08:16

## 2019-06-13 RX ADMIN — TRAMADOL HYDROCHLORIDE 50 MG: 50 TABLET, FILM COATED ORAL at 13:22

## 2019-06-13 RX ADMIN — TRAMADOL HYDROCHLORIDE 50 MG: 50 TABLET, FILM COATED ORAL at 22:03

## 2019-06-13 RX ADMIN — Medication 2 G: at 08:30

## 2019-06-13 RX ADMIN — ENOXAPARIN SODIUM 70 MG: 60 INJECTION SUBCUTANEOUS at 08:17

## 2019-06-13 RX ADMIN — MIRTAZAPINE 7.5 MG: 15 TABLET, FILM COATED ORAL at 22:03

## 2019-06-13 RX ADMIN — Medication 2 G: at 19:37

## 2019-06-13 RX ADMIN — Medication 2 G: at 15:21

## 2019-06-13 RX ADMIN — Medication 2 G: at 02:50

## 2019-06-13 NOTE — PROGRESS NOTES
Lying supine legs flexed, states left arm hurts, uncooperative, foul language, facial abrasion, no acute distress, neurovascular checks WDL.

## 2019-06-13 NOTE — PROGRESS NOTES
Nutrition follow-up:  Reason for initial assessment: Referral received from nursing admission Malnutrition Screening Tool   Recently Lost Weight Without Trying: Unsure  Eating Poorly Due to Decreased Appetite: Yes  Assessment:   Diet: DIET REGULAR  DIET NUTRITIONAL SUPPLEMENTS All Meals; [employer]      Food/Nutrition Patient History:  Pt presented to ED via EMS from a convenience store with altered mental status; admitted with sepsis. Past medical history notable for IV Drug Abuse, polysubstance abuse (heroin, amphetamines, THC, opiates). MSSA [place]acte[place] IV antibiotics. Up to chair/table at RD visit starting to eat breakfast.  Noted minimal use of R arm when preparing tray, declines assistance. When asked about food intake she replies \"I eat. \"  Her response to supplement intake query \"sometimes. \"  Pt reports ordering ad selena from [facility]  and asks for supplement to be included \"when she wants it. \"  Noted she requested it be removed from current tray service. Pt ends visit by stating \"I just want to be left alone. \"   Anthropometrics:Height: 5' 3\" (160 cm),  Weight: 65.8 kg (145 lb), Weight source not specified, Body mass index is 25.69 kg/m². BMI class of overweight. No new wt available. Per initial assessment: Pt meets ASPEN CRITERIA FOR MALNUTRITION: moderate malnutrition social/environmental-weight loss of 9% past 6 months and eating < 75% estimated needs past 3 months. Macronutrient needs:  EER:  7115-3594 kcal /day (25-30 kcal/kg BW)  EPR:  79-98 grams protein/day (1.2-1.5 grams/kg BW)  Intake/Comparative Standards: Per RD meal rounds: pt preparing to eat am meal but has not consumed anything. No recorded po throughout her admission available. Pt declines to provide a quantifiable po history. Insufficient data to assess her intake at this time.     Intervention:  Meals and snacks: Continue current diet.   Nutrition Supplement Therapy: Continue order for [employer] on all meal trays. Pt continues with the option to decline service. Discharge nutrition plan: Too soon to determine. Coordination of Nutrition Care:  IIDT Rounds.     [place], [address], [address]

## 2019-06-13 NOTE — PROGRESS NOTES
Hospitalist Progress Note     Admit Date:  2019 12:42 AM   Name:  Jose Alberto Mortensen   Age:  26 y.o.  :  1985   MRN:  [id number]   PCP:  Gertrude Severin, MD  Treatment Team: Attending Provider: Neil Lawrence MD; : Flower Avila; Utilization Review: Ana Solis RN; Care Manager: Maged Skinner LMSW    HPI/Subjective:   Ms Leslie Vigil is a 25 y/o female with hx prior DVT/PE on xarelto in 2018, off now, IV drug abuse, polysubstance abuse (heroin, amphetamines, THC, opiates) who was admitted - for MSSA bacteremia, L1 osteomyelitis, left shoulder bursitis. She was to be on IV abx per ID through  but was discharged to go to her daughter's graduation on po keflex. She was re-admitted for AMS and heroin use wandering in the street. Her iv ancef was resumed via midline. Unfortunately, she developed an acute thrombosis associated to her catheter over her left axillary and basilic veins on . lovenox was started. Picc team was unable to place anymore line in view of clots. IR consulted for central line placement.  - pt c/o L arm pain uncontrolled by current tylenol. Fevers overnight, better now. Pt denies other complaints.   No CP, SOB, dysuria, diarrhea     - central line placed for IV abx yest.  low grade fever yest.  Still c/o LUE pain better with tramadol    Objective:     Patient Vitals for the past 24 hrs:   Temp Pulse Resp BP SpO2   19 0413 98.3 °F (36.8 °C) 88 20 90/50 98 %   19 0010 98.4 °F (36.9 °C)       19 (!) 100.8 °F (38.2 °C) (!) 115 25 104/70 98 %   19 1627 98.7 °F (37.1 °C) (!) 121 20 112/67 96 %   19 0914 98.9 °F (37.2 °C) (!) 116        Oxygen Therapy  O2 Sat (%): 98 % (19 0413)  O2 Device: Room air (19 6430)    No intake or output data in the 24 hours ending 19 1350    *Note that automatically entered I/Os may not be accurate; dependent on patient compliance with collection and accurate data entry by techs. General:    Well nourished. Alert. CV:   RRR. No murmur, rub, or gallop. Lungs:   CTAB. No wheezing, rhonchi, or rales. Extremities: Warm and dry. No cyanosis or edema. L arm swelling and ttp from DVT  Skin:     No rashes or jaundice. Neuro:  No gross focal deficits    Data Review:  I have reviewed all labs, meds, and studies from the last 24 hours:    No results found for this or any previous visit (from the past 24 hour(s)). All Micro Results     Procedure Component Value Units Date/Time    CULTURE, BLOOD [[id number]] Collected:  06/04/19 0218    Order Status:  Completed Specimen:  Blood Updated:  06/09/19 0527     Special Requests: LEFT FOREARM        Culture result: NO GROWTH 5 DAYS       CULTURE, BLOOD [[id number]] Collected:  06/04/19 0232    Order Status:  Completed Specimen:  Blood Updated:  06/09/19 0527     Special Requests: --        RIGHT  SINGLE PORT       Culture result: NO GROWTH 5 DAYS       CULTURE, URINE [[id number]] Collected:  06/04/19 0142    Order Status:  Completed Specimen:  Urine from Clean catch Updated:  06/06/19 0828     Special Requests: NO SPECIAL REQUESTS        Culture result:       <10,000  COLONIES/mL  NORMAL SKIN MARTITA ISOLATED            No results found for this visit on 06/04/19.     Current Meds:  Current Facility-Administered Medications   Medication Dose Route Frequency    ceFAZolin (ANCEF) 2 g/20 mL in sterile water IV syringe  2 g IntraVENous Q6H    enoxaparin (LOVENOX) injection 70 mg  1 mg/kg SubCUTAneous Q12H    methadone (DOLOPHINE) tablet 10 mg  10 mg Oral BID    mirtazapine (REMERON) tablet 7.5 mg  7.5 mg Oral QHS    polyethylene glycol (MIRALAX) packet 17 g  17 g Oral DAILY    senna-docusate (PERICOLACE) 8.6-50 mg per tablet 1 Tab  1 Tab Oral DAILY    acetaminophen (TYLENOL) tablet 650 mg  650 mg Oral Q4H PRN    diphenhydrAMINE (BENADRYL) capsule 25 mg  25 mg Oral Q4H PRN    guaiFENesin (ROBITUSSIN) 100 mg/5 mL oral liquid 100 mg  100 mg Oral Q4H PRN    ibuprofen (MOTRIN) tablet 400 mg  400 mg Oral Q6H PRN    ondansetron (ZOFRAN) injection 4 mg  4 mg IntraVENous Q4H PRN    traMADol (ULTRAM) tablet 50 mg  50 mg Oral Q8H PRN     Facility-Administered Medications Ordered in Other Encounters   Medication Dose Route Frequency    heparin (PF) 2 units/ml in NS infusion 2,000 Units  1,000 mL Irrigation PRN       Other Studies (last 24 hours):  Ir Insert Tunl Cvc W/o Port Over 5 Yr    Result Date: 6/12/2019  History: Patient requires long-term IV infusion is a various medications. : Meghana Ash. Radiation dose: 62 cGy-cm2 Procedure: 1. Sedation with Versed 2. Ultrasound-guided access into the right internal jugular vein. A copy of the ultrasound placed in patient's record. 3. Tunneled central line placement under fluoroscopic control. Technique: After obtaining informed written consent from the patient which all risk benefits alternatives were discussed including the risk of bleeding and infection the patient brought to the interventional suite placed in the supine position. Limited sonographic evaluation of the neck was performed. The appropriate skin site was marked, prepped and draped usual sterile fashion and anesthetized with 2% lidocaine. Under direct ultrasound guidance access into the right internal jugular vein was obtained with a micropuncture needle and Seldinger technique. An 018 wire was placed. Utilizing standard upsizing technique a peel-away sheath was placed over the wire. The sheath was capped and flushed. 8 pocket was then created in the infraclavicular right chest wall. Utilizing a tunneler the catheter was pulled through to the neck sternotomy site. The catheter was cut to the appropriate length and placed through the peel-away sheath and the peel-away sheath was removed. The catheter sutured to skin and. The ports were primed with heparinized saline.  There are no immediate complications and the patient tolerated procedure well. Strict sterile maximal barrier technique was utilized, including sterile gloves and gown, as well as hat and mask. Sterile skin solution and drapes were also utilized. Findings: Limited sonographic evaluation of the right internal jugular vein demonstrates patency. Successful placement of tunneled central venous catheter with the tip terminating in the superior vena cava. Impression: 1. Successful ultrasound fluoroscopic guided placement of tunneled central venous catheter.       Assessment and Plan:     Hospital Problems as of 6/13/2019 Never Reviewed          Codes Class Noted - Resolved POA    * (Principal) Infection of T12-L1 disc space ([facility] ICD-10-CM: M46.35  ICD-9-CM: 730.98  6/9/2019 - Present Yes        Drug abuse ([facility] ICD-10-CM: F19.10  ICD-9-CM: 305.90  6/4/2019 - Present Yes        Sepsis ([facility] ICD-10-CM: A41.9  ICD-9-CM: 038.9, 995.91  6/4/2019 - Present Yes        Anemia of chronic disease (Chronic) ICD-10-CM: D63.8  ICD-9-CM: 285.29  6/4/2019 - Present Yes        Acute metabolic encephalopathy HNA-06-ANTONIETA: G93.41  ICD-9-CM: 348.31  6/4/2019 - Present Yes        IVDU (intravenous drug user) (Chronic) ICD-10-CM: F19.90  ICD-9-CM: 305.90  2/1/2019 - Present Yes              Plan:  MSSA bacteremia  -ancef EOT 6/24 per ID  -central line by IR today    DVT  -lovenox  -resume home xarelto at discharge; avoid in hospital in case we lose line access and need another procedure to replace    Heroin Use  -avoid narcotics; is on methadone    DC planning/Dispo:    -home 6/24    Diet:  DIET REGULAR  DIET NUTRITIONAL SUPPLEMENTS      Signed:  Jennifer Roman MD

## 2019-06-13 NOTE — PROGRESS NOTES
Complians of right chest wall pain, stated \" IV has a clot and it hurts\", central line catheter patent and have an immediate blood return, also states\" want to transfer to the hospital in [place], this hospital is incompetent\". Informed Dr. Siva Bacon of the patient's request to be transferred, he indicated that patient could sign AMA. Initial document labeled for Dr. Dima Alvarenga comments.

## 2019-06-14 PROBLEM — A41.9 SEPSIS (HCC): Status: RESOLVED | Noted: 2019-06-04 | Resolved: 2019-06-14

## 2019-06-14 PROBLEM — G93.41 ACUTE METABOLIC ENCEPHALOPATHY: Status: RESOLVED | Noted: 2019-06-04 | Resolved: 2019-06-14

## 2019-06-14 PROCEDURE — 65270000029 HC RM PRIVATE

## 2019-06-14 PROCEDURE — 74011250637 HC RX REV CODE- 250/637: Performed by: INTERNAL MEDICINE

## 2019-06-14 PROCEDURE — 74011250636 HC RX REV CODE- 250/636: Performed by: INTERNAL MEDICINE

## 2019-06-14 RX ORDER — CEFAZOLIN SODIUM/WATER 2 G/20 ML
2 SYRINGE (ML) INTRAVENOUS EVERY 6 HOURS
Qty: 800 ML | Refills: 0 | Status: SHIPPED
Start: 2019-06-14 | End: 2019-06-24

## 2019-06-14 RX ADMIN — METHADONE HYDROCHLORIDE 10 MG: 10 TABLET ORAL at 18:42

## 2019-06-14 RX ADMIN — ENOXAPARIN SODIUM 70 MG: 60 INJECTION SUBCUTANEOUS at 21:22

## 2019-06-14 RX ADMIN — TRAMADOL HYDROCHLORIDE 50 MG: 50 TABLET, FILM COATED ORAL at 09:50

## 2019-06-14 RX ADMIN — Medication 2 G: at 20:06

## 2019-06-14 RX ADMIN — METHADONE HYDROCHLORIDE 10 MG: 10 TABLET ORAL at 08:55

## 2019-06-14 RX ADMIN — Medication 2 G: at 08:57

## 2019-06-14 RX ADMIN — ENOXAPARIN SODIUM 70 MG: 60 INJECTION SUBCUTANEOUS at 08:55

## 2019-06-14 RX ADMIN — Medication 2 G: at 15:21

## 2019-06-14 RX ADMIN — Medication 2 G: at 01:48

## 2019-06-14 RX ADMIN — MIRTAZAPINE 7.5 MG: 15 TABLET, FILM COATED ORAL at 21:22

## 2019-06-14 NOTE — DISCHARGE SUMMARY
Hospitalist Discharge Summary     Admit Date:  2019 12:42 AM   Name:  Jose Alberto Mortensen   Age:  26 y.o.  :  1985   MRN:  [id number]   PCP:  Gertrude Severin, MD  Treatment Team: Attending Provider: Brett Inman MD; : Flower Avila; Utilization Review: Ana Solis RN; Care Manager: Maged Skinner LMSW    Problem List for this Hospitalization:  Hospital Problems as of 2019 Never Reviewed          Codes Class Noted - Resolved POA    * (Principal) Infection of T12-L1 disc space ([facility] [address] ICD-10-CM: M46.35  ICD-9-CM: 730.98  2019 - Present Yes        Drug abuse ([facility] [address] ICD-10-CM: F19.10  ICD-9-CM: 305.90  2019 - Present Yes        Anemia of chronic disease (Chronic) ICD-10-CM: D63.8  ICD-9-CM: 285.29  2019 - Present Yes        IVDU (intravenous drug user) (Chronic) ICD-10-CM: F19.90  ICD-9-CM: 305.90  2019 - Present Yes        RESOLVED: Sepsis [facility] ICD-10-CM: A41.9  ICD-9-CM: 038.9, 995.91  2019 - 2019 Yes        RESOLVED: Acute metabolic encephalopathy [facility]-02-DG: G93.41  ICD-9-CM: 348.31  2019 - 2019 Yes                Hospital Course:  Ms Uriah Hu a 25 y/o female with hx prior DVT/PE on xarelto in 2018, IV drug abuse, polysubstance abuse (heroin, amphetamines, THC, opiates) who was admitted - for MSSA bacteremia, L1 osteomyelitis, left shoulder bursitis. She was to be on IV abx per ID through  but was discharged to go to her daughter's graduation on po keflex. She was re-admitted  for AMS and heroin use, found wandering in the street. Her iv ancef was resumed via midline. Unfortunately, she developed an acute thrombosis associated to her catheter over her left axillary and basilic veins on .  lovenox was started. She reports she had xarelto prior to admission. Picc team was unable to place anymore line in view of clots. IR consulted for central line placement, did tunneled CVC on .   Has been stable vast majority of her stay. She became concerned about the cost of staying here as she is self pay, so requested transfer to [facility].  Apparently she is covered there at little to no cost due to her  heritage. She can be restarted on xarelto for DVT if desired, though lovenox might be cheaper while inpatient. She continues ancef until 6/24, and can be discharged with follow up after that. She was on methadone here 10mg BID; this probably should be weaned off or switched to daily in next 10 days if she is serious about starting to attend methadone clinics    Disposition: Other [facility] with Planned Acute Readmission  Activity: Activity as tolerated  Diet: DIET REGULAR  DIET NUTRITIONAL SUPPLEMENTS All Meals; Ensure Enlive    Follow up instructions, discharge meds at bottom of this note. Plan was discussed with pt. All questions answered. Patient was stable at time of discharge. Patient will call a physician or return if any concerns. Diagnostic Imaging/Tests:   Results   IR INSERT TUNL CVC W/O PORT OVER 5 YR (Accession [id number]) (Order [id number])   Allergies      No Known Allergies   Result Information     Status: Final result (Exam End: 6/12/2019 12:31) Provider Status: Open   Signed by     Signed Date/Time  Phone Pager   Josephine Villa 6/12/2019 14:45 [phone]    Exam Information     Status Exam Begun  Exam Ended    Final [99] 6/12/2019 12:28 6/12/2019 12:31   Study Result     History: Patient requires long-term IV infusion is a various medications.      : Harp.     Radiation dose: 62 cGy-cm2     Procedure:  1. Sedation with Versed   2. Ultrasound-guided access into the right internal jugular vein. A copy of the  ultrasound placed in patient's record.   3. Tunneled central line placement under fluoroscopic control.     Technique:     After obtaining informed written consent from the patient which all risk  benefits alternatives were discussed including the risk of bleeding and  infection the patient brought to the interventional suite placed in the supine  position. Limited sonographic evaluation of the neck was performed. The  appropriate skin site was marked, prepped and draped usual sterile fashion and  anesthetized with 2% lidocaine. Under direct ultrasound guidance access into the  right internal jugular vein was obtained with a micropuncture needle and  Seldinger technique. An 018 wire was placed. Utilizing standard upsizing  technique a peel-away sheath was placed over the wire. The sheath was capped and  flushed. 8 pocket was then created in the infraclavicular right chest wall. Utilizing a tunneler the catheter was pulled through to the neck sternotomy  site. The catheter was cut to the appropriate length and placed through the  peel-away sheath and the peel-away sheath was removed. The catheter sutured to  skin and. The ports were primed with heparinized saline. There are no immediate  complications and the patient tolerated procedure well.     Strict sterile maximal barrier technique was utilized, including sterile gloves  and gown, as well as hat and mask. Sterile skin solution and drapes were also  utilized.     Findings:     Limited sonographic evaluation of the right internal jugular vein demonstrates  patency.     Successful placement of tunneled central venous catheter with the tip  terminating in the superior vena cava.     IMPRESSION  Impression:  1. Successful ultrasound fluoroscopic guided placement of tunneled central  venous catheter.    Imaging     IR INSERT TUNL CVC W/O PORT OVER 5 YR (Order: [id number]) - 6/12/2019   Result History     IR INSERT TUNL CVC W/O PORT OVER 5 YR (Order #[id number]) on 6/12/2019 - Order Result History Report   PACS Images      Show images for IR INSERT TUNL CVC W/O PORT OVER 5 YR   Patient Images      Show images for Benjamin Divine          External Results Report   Order Report      Order Details   Results   DUPLEX UPPER EXT VENOUS LEFT (Accession [id number]) (Order [id number])   Allergies      No Known Allergies   Result Information     Status: Final result (Exam End: 6/10/2019 17:11) Provider Status: Open   Signed by     Signed Date/Time  Phone Pager   Curtis Cervantes 6/10/2019 17:26 [phone]    Exam Information     Status Exam Begun  Exam Ended    Final [99] 6/10/2019 16:45 6/10/2019 17:11   Study Result     TITLE: Upper extremity venous ultrasound examination.     INDICATION: Left upper extremity swelling and pain x2 days. Left upper  extremity midline vascular access device. .     TECHNIQUE: Grayscale, Color, and Spectral Doppler interrogation performed.     COMPARISON: Chest x-ray 6/4/2019.     FINDINGS: Patent right brachiocephalic and subclavian veins.     Patent left internal jugular, brachiocephalic, and subclavian veins. Occlusive  echogenic thrombus around the catheter in the left axillary and basilic veins.    Patent left cephalic, paired brachial, radial, and ulnar veins.     IMPRESSION  IMPRESSION: Catheter associated thrombus in the left axillary and basilic veins.            Imaging     DUPLEX UPPER EXT VENOUS LEFT (Order: [id number]) - 6/10/2019   Result History     DUPLEX UPPER EXT VENOUS LEFT (Order #[id number]) on 6/10/2019 - Order Result History Report   PACS Images      Show images for DUPLEX UPPER EXT VENOUS LEFT   Patient Images      Show images for Simona Simoni          External Results Report   Order Report      Order Details   Results   MRI SHOULDER LT WO CONT (Accession [id number]) (Order [id number])   Allergies      No Known Allergies   Result Information     Status: Final result (Exam End: 6/10/2019 16:47) Provider Status: Open   Signed by     Signed Date/Time  Phone Pager   Liv Golden 6/10/2019 17:12 [phone]    Exam Information     Status Exam Begun  Exam Ended    Final [99] 6/10/2019 15:58 6/10/2019 16:47   Study Result     MRI OF THE LEFT SHOULDER WITHOUT CONTRAST.     Clinical Indication: Left shoulder pain in a patient with bacteremia, evaluate  for possible osteomyelitis.     Procedure: Multiplanar and multisequence MR images of the left shoulder were  performed without gadolinium contrast.     Comparison: MRI of the left shoulder dated 5/31/2019      FINDINGS: Images are significantly degraded by motion.     Imaging possible submitted.     AC joint: The AC joint is unchanged when compared to the prior exam. There is no  fluid within the [facility] joint itself. A moderate amount of fluid is again noted in  the subacromial/subdeltoid bursa in keeping with bursitis.     Rotator cuff: There is persistent edema along the supraspinatus and  infraspinatus muscle bellies likely representing a mild underlying myositis. The  supraspinatus, infraspinatus, teres minor, and subscapularis tendons are intact. No rotator cuff tendon tear evident.     Biceps labral complex: Evaluation of the superior labrum is limited by motion  degradation. There is no gross tear of the biceps tendon. There is no joint  effusion. The joint capsule in axillary recess is intact.     Glenohumeral joint: Cellular marrow reconversion signal again noted throughout  the imaged osseous structures. No strong findings present to indicate  osteomyelitis. There is no joint effusion present at the glenohumeral joint to  more strongly suspect septic arthritis. These findings are unchanged from the  prior exam.     There is persistent inflammation within the anterior belly of the deltoid. Intramuscular abscess cannot be excluded without the benefit of intravenous  contrast. Shotty lymph nodes are noted in the left axilla.     IMPRESSION  IMPRESSION: Limited exam secondary to motion degradation on multiple images. 1. Subacromial/subdeltoid bursitis essentially unchanged from the prior exam  with reactive myositis suspected in the supraspinatus and infraspinatus muscle  bellies.   2. Cellular marrow reconversion signal is noted in the imaged bones. No strong  findings present to suspect osteomyelitis in the shoulder. 3. No joint effusion is noted at the [facility]. or glenohumeral joints present to  suspect septic arthritis. 4. Acute myositis suspected in the anterior belly of the deltoid with possible  pyomyositis. Intramuscular abscess is not able to be excluded without the  benefit of intravenous contrast.         Imaging     MRI SHOULDER LT WO CONT (Order: [id number]) - 6/10/2019   Result History     MRI SHOULDER LT WO CONT (Order #[id number]) on 6/10/2019 - Order Result History Report   PACS Images      Show images for MRI SHOULDER LT WO CONT   Patient Images      Show images for Laneview Josef          External Results Report   Order Report      Order Details   Results   DUPLEX LOWER EXT VENOUS BILAT (Accession [id number]) (Order [id number])   Allergies      No Known Allergies   Result Information     Status: Final result (Exam End: 6/9/2019 18:47) Provider Status: Open   Signed by     Signed Date/Time  Phone Pager   Blaze Trinidad 6/09/2019 18:54 [phone]    Exam Information     Status Exam Begun  Exam Ended    Final [99] 6/09/2019 18:05 6/09/2019 18:47   Study Result     Bilateral lower extremity venous duplex exam.     CLINICAL INDICATION: Moderate bilateral leg swelling for one day     PROCEDURE: Realtime grayscale, color, and spectral Doppler analysis of the  bilateral lower extremity deep venous system from the common femoral vein  through the posterior tibial and peroneal veins.     COMPARISON: No prior similar studies available for direct comparison.     FINDINGS: There is normal compressibility appreciated throughout the imaged  venous structures. Normal flow dynamics are noted.  No echogenic intraluminal  filling defect noted to indicate deep venous thrombosis.      IMPRESSION  IMPRESSION: No sonographic evidence for DVT in either lower extremity.      Imaging     DUPLEX LOWER EXT VENOUS BILAT (Order: [id number]) - 6/9/2019   Result History     DUPLEX LOWER EXT VENOUS BILAT (Order #[id number]) on 6/9/2019 - Order Result History Report   PACS Images      Show images for DUPLEX LOWER EXT VENOUS BILAT   Patient Images      Show images for Bibi Casa          External Results Report   Order Report      Order Details   Results   XR SHOULDER RT AP/LAT MIN 2 V (Accession [id number]) (Order [id number])   Allergies      No Known Allergies   Result Information     Status: Final result (Exam End: 6/4/2019 12:14) Provider Status: Open   Signed by     Signed Date/Time  Phone Pager   Sarai Vidhya 6/04/2019 13:25 [phone]    Exam Information     Status Exam Begun  Exam Ended    Final [99] 6/04/2019 12:05 6/04/2019 12:14   Study Result     Right Shoulder      INDICATION: Right shoulder pain. No known injury.     COMPARISON: None     TECHNIQUE: Three views of the right shoulder were obtained      FINDINGS:      There is no acute fracture or dislocation. Right clavicle and acromioclavicular  joint are intact. Visualized right upper lung is unremarkable.     IMPRESSION  IMPRESSION:     1. Negative right shoulder x-ray.    Imaging     XR SHOULDER RT AP/LAT MIN 2 V (Order: [id number]) - 6/4/2019   Result History     XR SHOULDER RT AP/LAT MIN 2 V (Order #[id number]) on 6/4/2019 - Order Result History Report   PACS Images      Show images for XR SHOULDER RT AP/LAT MIN 2 V   Patient Images      Show images for Bibi Casa          External Results Report   Order Report      Order Details   Results   XR CHEST PORT (Accession [id number]) (Order [id number])   Allergies      No Known Allergies   Result Information     Status: Final result (Exam End: 6/4/2019 01:28) Provider Status: Open   Signed by     Signed Date/Time  Phone Pager   DAVID MCKINNON 6/04/2019 01:31 [phone]    Exam Information     Status Exam Begun  Exam Ended    Final [99] 6/04/2019 01:09 6/04/2019 01:28   Study Result     EXAM: XR CHEST PORT     INDICATION: sepsis     COMPARISON: 5/13/2019     FINDINGS: A portable AP radiograph of the chest was obtained at 0123 hours. The  patient is on a cardiac monitor. The lungs are clear. The cardiac and  mediastinal contours and pulmonary vascularity are normal.  The bones and soft  tissues are grossly within normal limits.      IMPRESSION  IMPRESSION: Normal chest.   Imaging     XR CHEST PORT (Order: [id number]) - 6/4/2019   Result History     XR CHEST PORT (Order #[id number]) on 6/4/2019 - Order Result History Report   PACS Images      Show images for XR CHEST PORT   Patient Images      Show images for Koch Roles          External Results Report   Order Report      Order Details   Results   MRI SHOULDER LT W WO CONT (Accession [id number]) (Order [id number])   Allergies      No Known Allergies   Result Information     Status: Final result (Exam End: 5/31/2019 15:19) Provider Status: Open   Signed by     Signed Date/Time  Phone Pager   Kylah Mishra 5/31/2019 17:02 [phone]    Exam Information     Status Exam Begun  Exam Ended    Final [99] 5/31/2019 14:14 5/31/2019 15:19   Study Result     MRI of the left shoulder with and without contrast.     Clinical location: Left shoulder pain, IV drug abuse     PROCEDURE: Multiplanar and multisequence MR imaging performed through the left  shoulder prior to and following the administration of 14 cc of intravenous  Gadolinium contrast.     COMPARISON: No prior.     FINDINGS:     AC joint: The AC joint is intact. There is a  moderate amount of complex fluid  within the subacromial/subdeltoid bursa with marked thickening and inflammation  of the synovium which shows robust enhancement. An infectious/inflammatory  subacromial/subdeltoid bursitis is suspected.  There is reactive edematous signal  within the anterior belly and to a lesser extent the middle belly of the deltoid  in keeping with a reactive myositis.     Rotator cuff: Supraspinatus, infraspinatus, teres minor, and subscapularis  tendons are intact. No rotator cuff tendon tear evident.     Biceps labral complex: The long head of biceps tendon is intact. The superior  labrum is intact. The joint capsule in the axillary recess is intact.     Glenohumeral joint: Cellular reactive marrow signal is noted within the proximal  humerus and glenoid. No strong findings present to indicate osteomyelitis at  this point. There is no significant glenohumeral joint effusion. No definite  anterior or posterior labral tear evident.     IMPRESSION  IMPRESSION:  1. Acute infectious/inflammatory subacromial/subdeltoid bursitis suspected with  a reactive myositis in the anterior belly and possibly the middle belly of the  deltoid. 2. No bone marrow signal changes noted at this point to strongly indicate  osteomyelitis. 3. The glenohumeral and AC joints are unremarkable. No septic arthrosis  suspected at this point. 4. The rotator cuff tendon is intact. Imaging     MRI SHOULDER LT W WO CONT (Order: [id number]) - 5/28/2019   Result History     MRI SHOULDER LT W WO CONT (Order #[id number]) on 5/31/2019 - Order Result History Report   PACS Images      Show images for MRI SHOULDER LT W WO CONT   Patient Images      Show images for Berta Riding          External Results Report   Order Report      Order Details   Results   MRI LUMB SPINE W WO CONT (Accession [id number]) (Order [id number])   Allergies      No Known Allergies   Result Information     Status: Final result (Exam End: 5/29/2019 19:38) Provider Status: Open   Signed by     Signed Date/Time  Phone Pager   John Ortega 5/29/2019 20:03 [phone]    Exam Information     Status Exam Begun  Exam Ended    Final [99] 5/29/2019 19:14 5/29/2019 19:38   Study Result     MRI of the Lumbar Spine     INDICATION: Increasing back pain and leg weakness, history of IV drug use     Standard MRI sequences were obtained through the lumbar spine in multiple  planes.   Images were obtained before and after intravenous injection of 13 mL of  Dotarem. Compared with 2019     FINDINGS:  Right-sided facets inflammatory changes are still present at T12-L1 and L3-4. Associated fluid collection at L3-4 has decreased in size, now 6 mm. Inflammatory changes involving the right SI joint have also improved. There is  no developing disc abnormality. Spinal canal remains widely patent.     There is mild residual signal abnormality enhancement in the left side of the L1  vertebral body. There are no developing bone lesions. There are no fractures. There is normal signal in the distal spinal cord.     IMPRESSION  IMPRESSION:  1.  T12-L1 and L3-4 right facet inflammatory changes still present, but  improved. 2.  Right SI joint inflammatory changes also appear improved. 3.  Stable mild edema and enhancement in the L1 vertebral body. This could also  represent an inflammatory process with mild osteomyelitis not excluded. Echocardiogram results:  Transesophageal Echocardiogram  2D, Doppler, and Color Doppler    Patient: Ghislaine Morales  MR #: [id number]  : 1985  Age: 26 years  Gender: Female  Study date: 14-May-2019  Account #: [de-identified]  Height: 63 in  Weight: 169 lb  BSA: 1.8 mï¾²  Status:Routine  Location: Recovery room  BP: 118/ 84    Allergies: NO KNOWN ALLERGENS    Reading Physician: Greg Nickerson MD  Sonographer: MARY KAY Whitley  Group:  [facility]  Referring Physician: Greg Nickerson MD    INDICATIONS: Rule out endocarditis    PROCEDURE: This was a routine study. The risks and alternatives of the  procedure were explained to the patient and informed consent was obtained. A  transesophageal echocardiogram was performed. The study included complete 2D  imaging, complete spectral Doppler, and color Doppler.  An adult omniplane   probe  was inserted by the attending cardiologist. The transesophageal transducer   was  easily advanced and appropriate images were obtained without complications. Image quality was excellent. There were no complications during the   procedure. MEDICATIONS: Benzocaine spray, topical to oropharynx, prior to procedure. Viscous lidocaine, applied to oropharynx, prior to procedure. LEFT VENTRICLE: Size was normal. Systolic function was normal. Ejection  fraction was estimated in the range of 55 % to 60 %. There were no regional  wall motion abnormalities. Wall thickness was normal.    RIGHT VENTRICLE: The size was normal. Systolic function was normal.    LEFT ATRIUM: Size was normal. APPENDAGE: No thrombus was identified. ATRIAL SEPTUM: No defect or patent foramen ovale was identified. RIGHT ATRIUM: Size was normal. No thrombus was identified. SYSTEMIC VEINS: SVC: The superior vena cava size was normal. IVC: The   inferior  vena cava was normal in size. AORTIC VALVE: The valve was structurally normal, tri-commissural. There was   no  insufficiency. MITRAL VALVE: Valve structure was normal. There was mild regurgitation. TRICUSPID VALVE: The valve structure was normal. There was no regurgitation. PULMONIC VALVE: The valve structure was normal. There was no insufficiency. AORTA: The root exhibited normal size. The ascending aorta was normal in   size. The aortic arch was normal in size. The descending aorta was normal in size. There was no evidence for dissection. SUMMARY:    -  Left ventricle: Systolic function was normal. Ejection fraction was  estimated in the range of 55 % to 60 %. There were no regional wall motion  abnormalities. -  Mitral valve: There was mild regurgitation.     Prepared and signed by    Radha Mcfarland MD  Signed 14-May-2019 17:07:55    Procedures done this admission:  * No surgery found *    All Micro Results     Procedure Component Value Units Date/Time    CULTURE, BLOOD [[id number]] Collected:  06/04/19 0218    Order Status:  Completed Specimen:  Blood Updated:  06/09/19 0527     Special Requests: LEFT FOREARM        Culture result: NO GROWTH 5 DAYS       CULTURE, BLOOD [[id number]] Collected:  06/04/19 0232    Order Status:  Completed Specimen:  Blood Updated:  06/09/19 0527     Special Requests: --        RIGHT  SINGLE PORT       Culture result: NO GROWTH 5 DAYS       CULTURE, URINE [[id number]] Collected:  06/04/19 0142    Order Status:  Completed Specimen:  Urine from Clean catch Updated:  06/06/19 0828     Special Requests: NO SPECIAL REQUESTS        Culture result:       <10,000  COLONIES/mL  NORMAL SKIN MARTITA ISOLATED            Labs: Results:       BMP, Mg, Phos No results for input(s): NA, K, CL, CO2, AGAP, BUN, CREA, CA, GLU, MG, PHOS in the last 72 hours. CBC No results for input(s): WBC, RBC, HGB, HCT, PLT, GRANS, LYMPH, EOS, MONOS, BASOS, IG, ANEU, ABL, MAILE, ABM, ABB, AIG, HGBEXT, HCTEXT, PLTEXT, HGBEXT, HCTEXT, PLTEXT in the last 72 hours. LFT No results for input(s): SGOT, ALT, TBIL, AP, TP, ALB, GLOB, AGRAT, GPT in the last 72 hours.    Cardiac Testing Lab Results   Component Value Date/Time    CK 40 05/09/2019 05:45 PM    Troponin-I, Qt. <0.02 (L) 05/09/2019 05:45 PM      Coagulation Tests Lab Results   Component Value Date/Time    aPTT 26.3 11/16/2018 11:55 AM    aPTT 51.3 (H) 11/16/2018 04:35 AM    aPTT 132.4 (H) 11/15/2018 08:40 PM      A1c No results found for: HBA1C, HGBE8, ZKB8LOZS, YGW5FBKE   Lipid Panel No results found for: CHOL, CHOLPOCT, CHOLX, CHLST, CHOLV, 876894, HDL, LDL, LDLC, DLDLP, 747219, VLDLC, VLDL, TGLX, TRIGL, TRIGP, TGLPOCT, CHHD, CHHDX   Thyroid Panel No results found for: TSH, T4, FT4, TT3, T3U, TSHEXT, TSHEXT     Most Recent UA Lab Results   Component Value Date/Time    WBC  06/04/2019 01:42 AM    RBC 0-3 06/04/2019 01:42 AM    Epithelial cells 5-10 06/04/2019 01:42 AM    Bacteria 0 06/04/2019 01:42 AM    Casts 20-50 06/04/2019 01:42 AM    Crystals, urine 0 06/04/2019 01:42 AM    Mucus 0 06/04/2019 01:42 AM    Other observations RESULTS VERIFIED MANUALLY 06/04/2019 01:42 AM        No Known Allergies  Immunization History   Administered Date(s) Administered    TB Skin Test (PPD) Intradermal 02/01/2019, 05/17/2019       All Labs from Last 24 Hrs:  No results found for this or any previous visit (from the past 24 hour(s)).     Current Med List in Hospital:   Current Facility-Administered Medications   Medication Dose Route Frequency    ceFAZolin (ANCEF) 2 g/20 mL in sterile water IV syringe  2 g IntraVENous Q6H    enoxaparin (LOVENOX) injection 70 mg  1 mg/kg SubCUTAneous Q12H    methadone (DOLOPHINE) tablet 10 mg  10 mg Oral BID    mirtazapine (REMERON) tablet 7.5 mg  7.5 mg Oral QHS    polyethylene glycol (MIRALAX) packet 17 g  17 g Oral DAILY    senna-docusate (PERICOLACE) 8.6-50 mg per tablet 1 Tab  1 Tab Oral DAILY    acetaminophen (TYLENOL) tablet 650 mg  650 mg Oral Q4H PRN    diphenhydrAMINE (BENADRYL) capsule 25 mg  25 mg Oral Q4H PRN    guaiFENesin (ROBITUSSIN) 100 mg/5 mL oral liquid 100 mg  100 mg Oral Q4H PRN    ibuprofen (MOTRIN) tablet 400 mg  400 mg Oral Q6H PRN    ondansetron (ZOFRAN) injection 4 mg  4 mg IntraVENous Q4H PRN    traMADol (ULTRAM) tablet 50 mg  50 mg Oral Q8H PRN     Facility-Administered Medications Ordered in Other Encounters   Medication Dose Route Frequency    heparin (PF) 2 units/ml in NS infusion 2,000 Units  1,000 mL Irrigation PRN       Discharge Exam:  Patient Vitals for the past 24 hrs:   Temp Pulse Resp BP SpO2   06/14/19 1208 98.6 °F (37 °C) 90 18 (!) 86/61 97 %   06/14/19 0806 98.8 °F (37.1 °C) 92 18 98/67 97 %   06/14/19 0000 99.5 °F (37.5 °C) (!) 108 18 97/67 98 %   06/13/19 2058 99.3 °F (37.4 °C) 93 16 91/61 97 %   06/13/19 1608 99.2 °F (37.3 °C) (!) 104 18 97/68 99 %     Oxygen Therapy  O2 Sat (%): 97 % (06/14/19 1208)  O2 Device: Room air (06/11/19 1530)  No intake or output data in the 24 hours ending 06/14/19 1340    *Note that automatically entered I/Os may not be accurate; dependent on patient compliance with collection and accurate  by assistants. General:    Well nourished. Alert. Eyes:   Normal sclerae. Extraocular movements intact. ENT:  Normocephalic, atraumatic. Moist mucous membranes  CV:   Regular rate and rhythm. No murmur, rub, or gallop. Lungs:  Clear to auscultation bilaterally. No wheezing, rhonchi, or rales. Abdomen: Soft, nontender, nondistended. Bowel sounds normal.   Extremities: Warm and dry. No cyanosis or edema. Swelling, TTP LUE  Neurologic: CN II-XII grossly intact. Sensation intact. Skin:     No rashes or jaundice. Psych:  Normal mood and affect. Discharge Info:   Current Discharge Medication List      START taking these medications    Details   ceFAZolin (ANCEF) in sterile water 2 gram/20 mL 2 g IV syringe 20 mL by IntraVENous route every six (6) hours for 10 days. Qty: 800 mL, Refills: 0         CONTINUE these medications which have NOT CHANGED    Details   rivaroxaban (XARELTO) 20 mg tab tablet Take 20 mg by mouth daily (with dinner). STOP taking these medications       cephALEXin (KEFLEX) 500 mg capsule Comments:   Reason for Stopping: Follow Up Orders:  No orders of the defined types were placed in this encounter. Follow-up Information     Follow up With Specialties Details Why [facility] today admission           Time spent in patient discharge planning and coordination 35 minutes.     Signed:  Hilaria Woodard MD

## 2019-06-14 NOTE — PROGRESS NOTES
HOB elevated, no acute distress, neurovascular checks WDL, no complaints of calf pain, right chest wall central line intact, catheter patent and clamped.

## 2019-06-14 NOTE — PROGRESS NOTES
Pt requested to go to [facility] in [place] because her hospital bill will be paid for. Dr. Jason Joshi called and a physician is willing to accept pt to complete IV antibiotics until 6/24/19. SF would have to pay for transport to [address] [place] [address].   ADIN spoke with supervisor, Estella Smith, who received authorization for transport to [place] and back home on 6/24/19. ADIN set up [address] transport for 11 am Sat 6/15/19 from [place] with Shavonne Richard. Transport back to pt's home address was also scheduled for 6/24/19 pending time. [facility] will have to call later with that dc time. ADIN spoke with Lacy Price in admissions at [address]. Fax# [phone]. Lacy Price will be working the weekend and anticipates a bed available on Sat. Pt signed a Safety Agreement with the hospital and SW faxed it back.    Weekend ADIN aware of planned dc at 11 am on Sat but to call Lacy Price in admissions first. MD also aware of planned dc at 11 am.   Jhoan Youssef

## 2019-06-14 NOTE — PROGRESS NOTES
Hospitalist Progress Note     Admit Date:  2019 12:42 AM   Name:  Dwayne Buchanan   Age:  26 y.o.  :  1985   MRN:  [id number]   PCP:  Franky Mike MD  Treatment Team: Attending Provider: Zoya Thompson MD; : Katy Conner; Utilization Review: Trish Macario RN; Care Manager: Varun Finch LMSW    HPI/Subjective:   Ms Joseline Randhawa is a 25 y/o female with hx prior DVT/PE on xarelto in 2018, IV drug abuse, polysubstance abuse (heroin, amphetamines, THC, opiates) who was admitted - for MSSA bacteremia, L1 osteomyelitis, left shoulder bursitis. She was to be on IV abx per ID through  but was discharged to go to her daughter's graduation on po keflex. She was re-admitted  for AMS and heroin use, found wandering in the street. Her iv ancef was resumed via midline. Unfortunately, she developed an acute thrombosis associated to her catheter over her left axillary and basilic veins on .  lovenox was started. She reports she had xarelto prior to admission. Picc team was unable to place anymore line in view of clots. IR consulted for central line placement, did tunneled CVC on . Has been stable vast majority of her stay.  - pt sitting comfortably. Denies complaints this morning. No pain, fevers. Objective:     Patient Vitals for the past 24 hrs:   Temp Pulse Resp BP SpO2   19 0806 98.8 °F (37.1 °C) 92 18 98/67 97 %   19 0000 99.5 °F (37.5 °C) (!) 108 18 97/67 98 %   19 2058 99.3 °F (37.4 °C) 93 16 91/61 97 %   19 1608 99.2 °F (37.3 °C) (!) 104 18 97/68 99 %   19 1208 99 °F (37.2 °C) 95 18 90/55 98 %     Oxygen Therapy  O2 Sat (%): 97 % (19 0806)  O2 Device: Room air (19 1530)    No intake or output data in the 24 hours ending 19 08    *Note that automatically entered I/Os may not be accurate; dependent on patient compliance with collection and accurate  by techs. General:    Well nourished. Alert.    CV:   RRR. No murmur, rub, or gallop. Lungs:   CTAB. No wheezing, rhonchi, or rales. Extremities: Warm and dry. No cyanosis or edema. L arm swelling and ttp from DVT  Skin:     No rashes or jaundice. Neuro:  No gross focal deficits    Data Review:  I have reviewed all labs, meds, and studies from the last 24 hours:    No results found for this or any previous visit (from the past 24 hour(s)). All Micro Results     Procedure Component Value Units Date/Time    CULTURE, BLOOD [[id number]] Collected:  06/04/19 0218    Order Status:  Completed Specimen:  Blood Updated:  06/09/19 0527     Special Requests: LEFT FOREARM        Culture result: NO GROWTH 5 DAYS       CULTURE, BLOOD [[id number]] Collected:  06/04/19 0232    Order Status:  Completed Specimen:  Blood Updated:  06/09/19 0527     Special Requests: --        RIGHT  SINGLE PORT       Culture result: NO GROWTH 5 DAYS       CULTURE, URINE [[id number]] Collected:  06/04/19 0142    Order Status:  Completed Specimen:  Urine from Clean catch Updated:  06/06/19 0828     Special Requests: NO SPECIAL REQUESTS        Culture result:       <10,000  COLONIES/mL  NORMAL SKIN MARTITA ISOLATED            No results found for this visit on 06/04/19.     Current Meds:  Current Facility-Administered Medications   Medication Dose Route Frequency    ceFAZolin (ANCEF) 2 g/20 mL in sterile water IV syringe  2 g IntraVENous Q6H    enoxaparin (LOVENOX) injection 70 mg  1 mg/kg SubCUTAneous Q12H    methadone (DOLOPHINE) tablet 10 mg  10 mg Oral BID    mirtazapine (REMERON) tablet 7.5 mg  7.5 mg Oral QHS    polyethylene glycol (MIRALAX) packet 17 g  17 g Oral DAILY    senna-docusate (PERICOLACE) 8.6-50 mg per tablet 1 Tab  1 Tab Oral DAILY    acetaminophen (TYLENOL) tablet 650 mg  650 mg Oral Q4H PRN    diphenhydrAMINE (BENADRYL) capsule 25 mg  25 mg Oral Q4H PRN    guaiFENesin (ROBITUSSIN) 100 mg/5 mL oral liquid 100 mg  100 mg Oral Q4H PRN    ibuprofen (MOTRIN) tablet 400 mg  400 mg Oral Q6H PRN    ondansetron (ZOFRAN) injection 4 mg  4 mg IntraVENous Q4H PRN    traMADol (ULTRAM) tablet 50 mg  50 mg Oral Q8H PRN     Facility-Administered Medications Ordered in Other Encounters   Medication Dose Route Frequency    heparin (PF) 2 units/ml in NS infusion 2,000 Units  1,000 mL Irrigation PRN       Other Studies (last 24 hours):  No results found. Assessment and Plan:     Hospital Problems as of 6/14/2019 Never Reviewed          Codes Class Noted - Resolved POA    * (Principal) Infection of T12-L1 disc space ([facility] ICD-10-CM: M46.35  ICD-9-CM: 730.98  6/9/2019 - Present Yes        Drug abuse ([facility] ICD-10-CM: F19.10  ICD-9-CM: 305.90  6/4/2019 - Present Yes        Anemia of chronic disease (Chronic) ICD-10-CM: D63.8  ICD-9-CM: 285.29  6/4/2019 - Present Yes        IVDU (intravenous drug user) (Chronic) ICD-10-CM: F19.90  ICD-9-CM: 305.90  2/1/2019 - Present Yes        RESOLVED: Sepsis ([facility] ICD-10-CM: A41.9  ICD-9-CM: 038.9, 995.91  6/4/2019 - 6/14/2019 Yes        RESOLVED: Acute metabolic encephalopathy AGQ-53-AC: G93.41  ICD-9-CM: 348.31  6/4/2019 - 6/14/2019 Yes              Plan:  MSSA bacteremia  -ancef EOT 6/24 per ID  -central line by IR done 6/12.  -blood cultures from 5/9 MSSA. Blood cx 6/4 NGTD    DVT LUE  -from midline placed earlier in illness; now removed. -lovenox 70mg BID  -resume home xarelto at discharge    Heroin Use  -avoid strong narcotics though I think she does occasionally have some real pain in LUE from swelling; on tramadol PRN  -is on methadone 10mg BID    DC planning/Dispo:    -pt can be discharged after IV abx completed on 6/24.     Diet:  DIET REGULAR  DIET NUTRITIONAL SUPPLEMENTS      Signed:  Michel Yeh MD

## 2019-06-15 VITALS
BODY MASS INDEX: 25.69 KG/M2 | RESPIRATION RATE: 18 BRPM | TEMPERATURE: 98.4 F | OXYGEN SATURATION: 100 % | HEIGHT: 63 IN | DIASTOLIC BLOOD PRESSURE: 79 MMHG | HEART RATE: 84 BPM | WEIGHT: 145 LBS | SYSTOLIC BLOOD PRESSURE: 123 MMHG

## 2019-06-15 PROCEDURE — 74011250637 HC RX REV CODE- 250/637: Performed by: INTERNAL MEDICINE

## 2019-06-15 PROCEDURE — 74011250636 HC RX REV CODE- 250/636: Performed by: INTERNAL MEDICINE

## 2019-06-15 RX ADMIN — SENNOSIDES AND DOCUSATE SODIUM 1 TABLET: 8.6; 5 TABLET ORAL at 07:27

## 2019-06-15 RX ADMIN — METHADONE HYDROCHLORIDE 10 MG: 10 TABLET ORAL at 07:27

## 2019-06-15 RX ADMIN — TRAMADOL HYDROCHLORIDE 50 MG: 50 TABLET, FILM COATED ORAL at 07:27

## 2019-06-15 RX ADMIN — Medication 2 G: at 01:44

## 2019-06-15 RX ADMIN — ENOXAPARIN SODIUM 70 MG: 60 INJECTION SUBCUTANEOUS at 07:26

## 2019-06-15 RX ADMIN — Medication 2 G: at 07:26

## 2019-06-15 NOTE — PROGRESS NOTES
TRANSFER - OUT REPORT:    Verbal report given to Ruben Sherman RN (name) on Zena Tirado  being transferred to [facility] (unit) for routine progression of care       Report consisted of patients Situation, Background, Assessment and   Recommendations(SBAR). Information from the following report(s) SBAR, Kardex, Intake/Output, MAR and Recent Results was reviewed with the receiving nurse. Lines:   Single Lumen Venous Catheter 06/12/19 Right Internal jugular (Active)   Central Line Being Utilized Yes 6/15/2019  7:38 AM   Criteria for Appropriate Use Long term IV/antibiotic administration 6/15/2019  7:38 AM   Site Assessment Clean, dry, & intact 6/15/2019  7:38 AM   Infiltration Assessment 0 6/15/2019  7:38 AM   Affected Extremity/Extremities Color distal to insertion site pink (or appropriate for race) 6/15/2019  7:38 AM   Dressing Status Clean, dry, & intact 6/15/2019  7:38 AM   Dressing Type Disk with Chlorhexadine gluconate (CHG); Transparent 6/15/2019  7:38 AM   Hub Color/Line Status Flushed 6/15/2019  7:38 AM        Opportunity for questions and clarification was provided.

## 2019-06-15 NOTE — PROGRESS NOTES
A.M assessment complete; pt in bed looking at her phone. Alert & oriented. Resp even & unlabored on room air; lungs clear. HR regular. Abdomen soft with active bowel sounds. Call light in reach; bed low & locked.

## 2019-06-15 NOTE — PROGRESS NOTES
Shift assessment done. Pt AAO x4. Respirations even and unlabored. HR regular. Denies needs and pain this time. Safety measures provided. Call light in reach. Will continue to monitor.

## 2019-06-15 NOTE — PROGRESS NOTES
Care Management Interventions  PCP Verified by CM: No  Mode of Transport at Discharge: BLS  Transition of Care Consult (CM Consult): Other(hospital)  Current Support Network: Other  Confirm Follow Up Transport: Family  Plan discussed with Pt/Family/Caregiver: Yes  Freedom of Choice Offered: Yes  Discharge Location  Discharge Placement: Other:(Transfered to another Hospital)  Confirmed with YANIRA#[phone] @ [facility] that we are a go for pt transfer, accepting Dr. Shawn Santos #[phone] ext# [phone] or # [phone](per Dr. Serina Webber telephone conversation). Rm # is 2008, pt is to go throuth the ED, RN given # to call reportThorn is sched for  @ 11am. All information  has been faxed and confirmed, packet on desk of copies. CM signing off.

## 2020-12-02 ENCOUNTER — HOSPITAL ENCOUNTER (EMERGENCY)
Age: 35
Discharge: HOME | End: 2020-12-02
Attending: EMERGENCY MEDICINE

## 2020-12-02 PROCEDURE — 75810000275 HC EMERGENCY DEPT VISIT NO LEVEL OF CARE

## 2020-12-02 RX ORDER — AZITHROMYCIN 250 MG/1
1000 TABLET, FILM COATED ORAL
Status: DISCONTINUED | OUTPATIENT
Start: 2020-12-02 | End: 2020-12-02 | Stop reason: HOSPADM

## 2024-03-02 NOTE — ED TRIAGE NOTES
Pt states that she has a bed at the [facility] for detox of heroin. States that she lost her Rx for Xarelto No